# Patient Record
Sex: MALE | Race: WHITE | Employment: OTHER | URBAN - METROPOLITAN AREA
[De-identification: names, ages, dates, MRNs, and addresses within clinical notes are randomized per-mention and may not be internally consistent; named-entity substitution may affect disease eponyms.]

---

## 2017-01-26 ENCOUNTER — OFFICE VISIT (OUTPATIENT)
Dept: FAMILY MEDICINE CLINIC | Age: 77
End: 2017-01-26

## 2017-01-26 VITALS
SYSTOLIC BLOOD PRESSURE: 161 MMHG | BODY MASS INDEX: 26.2 KG/M2 | WEIGHT: 183 LBS | RESPIRATION RATE: 18 BRPM | OXYGEN SATURATION: 98 % | HEIGHT: 70 IN | TEMPERATURE: 98.2 F | DIASTOLIC BLOOD PRESSURE: 86 MMHG | HEART RATE: 82 BPM

## 2017-01-26 DIAGNOSIS — I10 ESSENTIAL HYPERTENSION: Primary | ICD-10-CM

## 2017-01-26 RX ORDER — LISINOPRIL 20 MG/1
20 TABLET ORAL DAILY
Qty: 90 TAB | Refills: 3 | Status: SHIPPED | OUTPATIENT
Start: 2017-01-26 | End: 2017-04-13 | Stop reason: SDUPTHER

## 2017-01-26 RX ORDER — AMLODIPINE BESYLATE 5 MG/1
5 TABLET ORAL DAILY
Qty: 90 TAB | Refills: 3 | Status: SHIPPED | OUTPATIENT
Start: 2017-01-26 | End: 2017-06-08

## 2017-01-26 NOTE — PROGRESS NOTES
Pt here for medication evaluation on lisinopril-hctz. Reports that he has had a rash on chest and legs since the hctz was added. States that he has stopped taking lisinopril-hctz and has been taking an old RX with only lisinopril. Requesting a RX for alternate BP medication. Also would like to discuss Advanced Directive. Pt completed and brought forms to be added to file. Subjective: (As above and below)     Chief Complaint   Patient presents with    Medication Evaluation     he is a 64y.o. year old male who presents for evaluation. Reviewed PmHx, RxHx, FmHx, SocHx, AllgHx and updated in chart. Review of Systems - negative except as listed above    Objective:     Vitals:    01/26/17 1504   BP: 161/86   Pulse: 82   Resp: 18   Temp: 98.2 °F (36.8 °C)   TempSrc: Oral   SpO2: 98%   Weight: 183 lb (83 kg)   Height: 5' 10\" (1.778 m)     Physical Examination: General appearance - alert, well appearing, and in no distress  Mental status - normal mood, behavior, speech, dress, motor activity, and thought processes  Eyes - pupils equal and reactive, extraocular eye movements intact  Mouth - mucous membranes moist, pharynx normal without lesions  Chest - clear to auscultation, no wheezes, rales or rhonchi, symmetric air entry  Heart - normal rate, regular rhythm, normal S1, S2, no murmurs, rubs, clicks or gallops  Musculoskeletal - no joint tenderness, deformity or swelling  Extremities - peripheral pulses normal, no pedal edema, no clubbing or cyanosis    Assessment/ Plan:   1. Essential hypertension  -continue on 20mg lisinopril  -Stop HCTZ due to rash  -add in amlodipine to improve BP control     Follow-up Disposition: As needed or in one month for BP check  I have discussed the diagnosis with the patient and the intended plan as seen in the above orders. The patient has received an after-visit summary and questions were answered concerning future plans.      Medication Side Effects and Warnings were discussed with patient: yes  Patient Labs were reviewed: yes  Patient Past Records were reviewed:  yes    Lynsey Hester M.D.

## 2017-01-26 NOTE — PROGRESS NOTES
Pt here for medication evaluation on lisinopril-hctz. Reports that he has had a rash on chest and legs since the hctz was added. States that he has stopped taking lisinopril-hctz and has been taking an old RX with only lisinopril. Requesting a RX for alternate BP medication. Also would like to discuss Advanced Directive.

## 2017-01-26 NOTE — MR AVS SNAPSHOT
Visit Information Date & Time Provider Department Dept. Phone Encounter #  
 1/26/2017  2:20 PM Ines Jordan MD [address] [facility] [phone] [id number] Upcoming Health Maintenance Date Due DTaP/Tdap/Td series (1 - Tdap) 3/27/1961 Pneumococcal 65+ Low/Medium Risk (2 of 2 - PPSV23) 3/8/2017 MEDICARE YEARLY EXAM 3/9/2017 GLAUCOMA SCREENING Q2Y 3/7/2018 COLONOSCOPY 12/2/2024 Allergies as of 1/26/2017  Review Complete On: 1/26/2017 By: Ines Jordan MD  
  
 Severity Noted Reaction Type Reactions Pcn [Penicillins]  06/01/2010    Other (comments) MOUTH SORE Current Immunizations  Reviewed on 3/8/2016 Name Date Influenza Vaccine 10/1/2016, 3/8/2016, 10/21/2014 Pneumococcal Conjugate (PCV-13) 3/8/2016 Not reviewed this visit You Were Diagnosed With   
  
 Codes Comments Essential hypertension    -  Primary ICD-10-CM: I10 
ICD-9-CM: 401.9 Vitals BP Pulse Temp Resp Height(growth percentile) Weight(growth percentile) 161/86 (BP 1 Location: Right arm, BP Patient Position: Sitting) 82 98.2 °F (36.8 °C) (Oral) 18 5' 10\" (1.778 m) 183 lb (83 kg) SpO2 BMI Smoking Status 98% 26.26 kg/m2 Former Smoker Vitals History BMI and BSA Data Body Mass Index Body Surface Area  
 26.26 kg/m 2 2.02 m 2 Preferred Pharmacy Pharmacy Name Phone [address] [address] [address] [address] [phone] Your Updated Medication List  
  
   
This list is accurate as of: 1/26/17  3:25 PM.  Always use your most recent med list. amLODIPine 5 mg tablet Commonly known as:  Arlyss Blanchard Take 1 Tab by mouth daily. aspirin delayed-release 81 mg tablet Take  by mouth daily. cetirizine 10 mg tablet Commonly known as:  ZYRTEC Take 1 Tab by mouth daily. lisinopril 20 mg tablet Commonly known as:  Marilynne Shows Take 1 Tab by mouth daily. Prescriptions Sent to Pharmacy Refills  
 lisinopril (PRINIVIL, ZESTRIL) 20 mg tablet 3 Sig: Take 1 Tab by mouth daily. Class: Normal  
 Pharmacy: [facility] [address], [facility] [address] Ph #: [phone] Route: Oral  
 amLODIPine (NORVASC) 5 mg tablet 3 Sig: Take 1 Tab by mouth daily. Class: Normal  
 Pharmacy: [facility] [address], [facility] [address] Ph #: [phone] Route: Oral  
  
Introducing [facility]! Dear Esthela Bradley: 
Thank you for requesting a [employer] account. Our records indicate that you already have an active [employer] account. You can access your account anytime at [email or web address] Did you know that you can access your hospital and ER discharge instructions at any time in [employer]? You can also review all of your test results from your hospital stay or ER visit. Additional Information If you have questions, please visit the Frequently Asked Questions section of the [employer] website at [email or web address] Remember, [employer] is NOT to be used for urgent needs. For medical emergencies, dial 911. Now available from your iPhone and Android! Please provide this summary of care documentation to your next provider. Your primary care clinician is listed as MAIDA HARLEY. If you have any questions after today's visit, please call [phone].

## 2017-04-14 RX ORDER — LISINOPRIL 20 MG/1
20 TABLET ORAL DAILY
Qty: 90 TAB | Refills: 3 | Status: SHIPPED | OUTPATIENT
Start: 2017-04-14 | End: 2018-05-24 | Stop reason: SDUPTHER

## 2017-04-18 ENCOUNTER — OFFICE VISIT (OUTPATIENT)
Dept: FAMILY MEDICINE CLINIC | Age: 77
End: 2017-04-18

## 2017-04-18 VITALS
RESPIRATION RATE: 20 BRPM | HEART RATE: 79 BPM | DIASTOLIC BLOOD PRESSURE: 78 MMHG | BODY MASS INDEX: 26.2 KG/M2 | HEIGHT: 70 IN | TEMPERATURE: 97.3 F | WEIGHT: 183 LBS | SYSTOLIC BLOOD PRESSURE: 133 MMHG | OXYGEN SATURATION: 96 %

## 2017-04-18 DIAGNOSIS — I10 ESSENTIAL HYPERTENSION: ICD-10-CM

## 2017-04-18 DIAGNOSIS — H26.9 CATARACT OF LEFT EYE, UNSPECIFIED CATARACT TYPE: ICD-10-CM

## 2017-04-18 DIAGNOSIS — Z00.00 ROUTINE GENERAL MEDICAL EXAMINATION AT A HEALTH CARE FACILITY: Primary | ICD-10-CM

## 2017-04-18 DIAGNOSIS — Z71.89 ADVANCED CARE PLANNING/COUNSELING DISCUSSION: ICD-10-CM

## 2017-04-18 NOTE — MR AVS SNAPSHOT
Visit Information Date & Time Provider Department Dept. Phone Encounter #  
 4/18/2017  2:40 PM Luzma Nails MD [address] [facility] [phone] [id number] Follow-up Instructions Return if symptoms worsen or fail to improve. Upcoming Health Maintenance Date Due DTaP/Tdap/Td series (1 - Tdap) 3/27/1961 Pneumococcal 65+ Low/Medium Risk (2 of 2 - PPSV23) 3/8/2017 MEDICARE YEARLY EXAM 3/9/2017 GLAUCOMA SCREENING Q2Y 3/7/2018 COLONOSCOPY 12/2/2024 Allergies as of 4/18/2017  Review Complete On: 4/18/2017 By: Luzma Nails MD  
  
 Severity Noted Reaction Type Reactions Pcn [Penicillins]  06/01/2010    Other (comments) MOUTH SORE Current Immunizations  Reviewed on 3/8/2016 Name Date Influenza Vaccine 10/1/2016, 3/8/2016, 10/21/2014 Pneumococcal Conjugate (PCV-13) 3/8/2016 Not reviewed this visit You Were Diagnosed With   
  
 Codes Comments Routine general medical examination at a health care facility    -  Primary ICD-10-CM: Z00.00 ICD-9-CM: V70.0 Advanced care planning/counseling discussion     ICD-10-CM: Z71.89 ICD-9-CM: V65.49 Essential hypertension     ICD-10-CM: I10 
ICD-9-CM: 401.9 Cataract of left eye, unspecified cataract type     ICD-10-CM: H26.9 ICD-9-CM: 366.9 Vitals BP Pulse Temp Resp Height(growth percentile) Weight(growth percentile) 133/78 79 97.3 °F (36.3 °C) 20 5' 10\" (1.778 m) 183 lb (83 kg) SpO2 BMI Smoking Status 96% 26.26 kg/m2 Former Smoker Vitals History BMI and BSA Data Body Mass Index Body Surface Area  
 26.26 kg/m 2 2.02 m 2 Preferred Pharmacy Pharmacy Name Phone [address], [facility] [address] [phone] Your Updated Medication List  
  
   
This list is accurate as of: 4/18/17  3:55 PM.  Always use your most recent med list.  
  
  
  
  
 amLODIPine 5 mg tablet Commonly known as:  Inna Jaksch Take 1 Tab by mouth daily. cetirizine 10 mg tablet Commonly known as:  ZYRTEC Take 1 Tab by mouth daily. lisinopril 20 mg tablet Commonly known as:  Aashish Leaver Take 1 Tab by mouth daily. Follow-up Instructions Return if symptoms worsen or fail to improve. Introducing [facility]! Dear Roxi Garza: 
Thank you for requesting a [employer] account. Our records indicate that you already have an active [employer] account. You can access your account anytime at [email or web address] Did you know that you can access your hospital and ER discharge instructions at any time in [employer]? You can also review all of your test results from your hospital stay or ER visit. Additional Information If you have questions, please visit the Frequently Asked Questions section of the [employer] website at [email or web address] Remember, [employer] is NOT to be used for urgent needs. For medical emergencies, dial 911. Now available from your iPhone and Android! Please provide this summary of care documentation to your next provider. Your primary care clinician is listed as MAIDA HARLEY. If you have any questions after today's visit, please call [phone].

## 2017-04-18 NOTE — PROGRESS NOTES
Patient ehre for cataract surgery pre-op. Patient also has form to be completed for bp monitor. Med eval, last visit in Jan with Dr. Lauren Chan, she stopped hctz, due to rash. Patient wondering if he should get back on it. 1. Have you been to the ER, urgent care clinic since your last visit? Hospitalized since your last visit? No    2. Have you seen or consulted any other health care providers outside of the [address] [place] [address] since your last visit? Include any pap smears or colon screening. No       See scanned form      Medicare Wellness Exam:    Chief Complaint   Patient presents with    Pre-op Exam     cataract 5/8/17    Other     form completed for bp machine.  Medication Evaluation     hctz stopped last visit due to rash. he is a 76y.o. year old male who presents for evaluation for their Medicare Wellness Visit. Lynita Dose is completed and assessed=yes  Depression Screen is completed and assessed=yes  Medication list reviewed and adjusted for accuracy=yes  Immunizations reviewed and updated=yes  Health/Preventative Screenings reviewed and updated=yes  ADL Functions reviewed=yes    Patient Active Problem List    Diagnosis    Advanced care planning/counseling discussion     Asked pt to bring in      Thrombocytopenia ([address]    Advance care planning     Pt has one      Acne rosacea    Hypercholesteremia    HTN (hypertension)       Reviewed PmHx, RxHx, FmHx, SocHx, AllgHx and updated and dated in the chart.     Review of Systems - negative except as listed above in the HPI    Objective:     Vitals:    04/18/17 1514   BP: 133/78   Pulse: 79   Resp: 20   Temp: 97.3 °F (36.3 °C)   SpO2: 96%   Weight: 183 lb (83 kg)   Height: 5' 10\" (1.778 m)     Physical Examination: General appearance - alert, well appearing, and in no distress  Chest - clear to auscultation, no wheezes, rales or rhonchi, symmetric air entry  Heart - normal rate, regular rhythm, normal S1, S2, no murmurs, rubs, clicks or gallops    Assessment/ Plan:   Mckenzie Lovell was seen today for pre-op exam, other and medication evaluation. Diagnoses and all orders for this visit:    Routine general medical examination at a health care facility  -see below    Advanced care planning/counseling discussion  -has LW    Essential hypertension  -at goal    Cataract of left eye, unspecified cataract type  -see form faxed         -Pain evaluation performed in office=0  -Cognitive Screen performed in office=nl  -Depression Screen, Fall risks (by up and go test)  and ADL functionality were addressed  -Medication list updated and reviewed for any changes   -A comprehensive review of medical issues and a plan was formulated  -End of life planning was addressed with pt   -Health Screenings for preventions were addressed and a plan was formulated  -Shingles Vaccine was recommended  -Discussed with patient cancer risk factors and appropriate screenings for age  -Patient evaluated for colonoscopy and referred if needed per screeing criteria  -Labs from previous visits were discussed with patient   -Discussed with patient diet and exercise and formulated a plan as needed  -An Advanced care plan was developed with the patient.  -Alcohol screening performed and was negative    -Follow-up Disposition:  Return if symptoms worsen or fail to improve. I have discussed the diagnosis with the patient and the intended plan as seen in the above orders. The patient understands and agrees with the plan. The patient has received an after-visit summary and questions were answered concerning future plans. Medication Side Effects and Warnings were discussed with patien  Patient Labs were reviewed and or requested  Patient Past Records were reviewed and or requested    There are no Patient Instructions on file for this visit.       José Manuel Onofre M.D.

## 2017-05-22 ENCOUNTER — OFFICE VISIT (OUTPATIENT)
Dept: FAMILY MEDICINE CLINIC | Age: 77
End: 2017-05-22

## 2017-05-22 VITALS
WEIGHT: 184 LBS | DIASTOLIC BLOOD PRESSURE: 87 MMHG | OXYGEN SATURATION: 97 % | RESPIRATION RATE: 18 BRPM | BODY MASS INDEX: 26.34 KG/M2 | HEIGHT: 70 IN | HEART RATE: 78 BPM | SYSTOLIC BLOOD PRESSURE: 145 MMHG | TEMPERATURE: 98.1 F

## 2017-05-22 DIAGNOSIS — I10 ESSENTIAL HYPERTENSION: ICD-10-CM

## 2017-05-22 DIAGNOSIS — I48.91 ATRIAL FIBRILLATION, UNSPECIFIED TYPE (HCC): ICD-10-CM

## 2017-05-22 DIAGNOSIS — I49.9 IRREGULAR HEART RHYTHM: Primary | ICD-10-CM

## 2017-05-22 NOTE — MR AVS SNAPSHOT
Visit Information Date & Time Provider Department Dept. Phone Encounter #  
 5/22/2017 10:00 AM Josefa Back MD [address] [facility] [phone] [id number] Follow-up Instructions Return if symptoms worsen or fail to improve. Upcoming Health Maintenance Date Due DTaP/Tdap/Td series (1 - Tdap) 3/27/1961 Pneumococcal 65+ Low/Medium Risk (2 of 2 - PPSV23) 3/8/2017 INFLUENZA AGE 9 TO ADULT 8/1/2017 GLAUCOMA SCREENING Q2Y 3/7/2018 MEDICARE YEARLY EXAM 4/19/2018 COLONOSCOPY 12/2/2024 Allergies as of 5/22/2017  Review Complete On: 5/22/2017 By: Josefa Back MD  
  
 Severity Noted Reaction Type Reactions Pcn [Penicillins]  06/01/2010    Other (comments) MOUTH SORE Current Immunizations  Reviewed on 3/8/2016 Name Date Influenza Vaccine 10/1/2016, 3/8/2016, 10/21/2014 Pneumococcal Conjugate (PCV-13) 3/8/2016 Not reviewed this visit You Were Diagnosed With   
  
 Codes Comments Irregular heart rhythm    -  Primary ICD-10-CM: I49.9 ICD-9-CM: 427.9 Atrial fibrillation, unspecified type ([facility])     ICD-10-CM: I48.91 
ICD-9-CM: 427.31 Essential hypertension     ICD-10-CM: I10 
ICD-9-CM: 401.9 Vitals BP Pulse Temp Resp Height(growth percentile) Weight(growth percentile) 145/87 78 98.1 °F (36.7 °C) 18 5' 10\" (1.778 m) 184 lb (83.5 kg) SpO2 BMI Smoking Status 97% 26.4 kg/m2 Former Smoker Vitals History BMI and BSA Data Body Mass Index Body Surface Area  
 26.4 kg/m 2 2.03 m 2 Preferred Pharmacy Pharmacy Name Phone [address] [address] [facility] [address] [phone] Your Updated Medication List  
  
   
This list is accurate as of: 5/22/17 10:23 AM.  Always use your most recent med list. amLODIPine 5 mg tablet Commonly known as:  Deisi Hurtado Take 1 Tab by mouth daily. lisinopril 20 mg tablet Commonly known as:  Yolanda Julien Take 1 Tab by mouth daily. We Performed the Following AMB POC EKG ROUTINE W/ 12 LEADS, INTER & REP [91542 CPT(R)] REFERRAL TO CARDIOLOGY [TBY90 Custom] Follow-up Instructions Return if symptoms worsen or fail to improve. Referral Information Referral ID Referred By Referred To  
  
 [id number] Chandni HARLEY MD   
   [address] Phone: [phone] Fax: [phone] Visits Status Start Date End Date 1 New Request 5/22/17 5/22/18 If your referral has a status of pending review or denied, additional information will be sent to support the outcome of this decision. Introducing [facility]! Dear Steve Martel: 
Thank you for requesting a [employer] account. Our records indicate that you already have an active [employer] account. You can access your account anytime at [email or web address] Did you know that you can access your hospital and ER discharge instructions at any time in [employer]? You can also review all of your test results from your hospital stay or ER visit. Additional Information If you have questions, please visit the Frequently Asked Questions section of the [employer] website at [email or web address] Remember, [employer] is NOT to be used for urgent needs. For medical emergencies, dial 911. Now available from your iPhone and Android! Please provide this summary of care documentation to your next provider. Your primary care clinician is listed as MAIDA HARLEY. If you have any questions after today's visit, please call [phone].

## 2017-05-22 NOTE — PROGRESS NOTES
Patient states he had cataract surgery 2 weeks ago, anethestesiologist noticed he had irregular heart rate. Patient also states he has had more swelling in feet, more so on left than right. 1. Have you been to the ER, urgent care clinic since your last visit? Hospitalized since your last visit? No    2. Have you seen or consulted any other health care providers outside of the [address] [place] [address] since your last visit? Include any pap smears or colon screening. No     No palpitations    No hx of AF and no sxs      Chief Complaint   Patient presents with    Heart Problem     anestesiologist noticed during cataract surgery 2 weeks ago.  Foot Swelling     right foot swelling more than left     he is a 76y.o. year old male who presents for evalution. Reviewed PmHx, RxHx, FmHx, SocHx, AllgHx and updated and dated in the chart. Patient Active Problem List    Diagnosis    Advanced care planning/counseling discussion     Asked pt to bring in      Thrombocytopenia ([facility]    Advance care planning     Pt has one      Acne rosacea    Hypercholesteremia    HTN (hypertension)       Review of Systems - negative except as listed above in the HPI    Objective:     Vitals:    05/22/17 0959   BP: 145/87   Pulse: 78   Resp: 18   Temp: 98.1 °F (36.7 °C)   SpO2: 97%   Weight: 184 lb (83.5 kg)   Height: 5' 10\" (1.778 m)     Physical Examination: General appearance - alert, well appearing, and in no distress  Neck - supple, no significant adenopathy  Chest - clear to auscultation, no wheezes, rales or rhonchi, symmetric air entry  Heart - normal rate, regular rhythm, normal S1, S2, no murmurs, rubs, clicks or gallops  Abdomen - soft, nontender, nondistended, no masses or organomegaly    Assessment/ Plan:   Jey Mitchell was seen today for heart problem and foot swelling.     Diagnoses and all orders for this visit:    Irregular heart rhythm  -     AMB POC EKG ROUTINE W/ 12 LEADS, INTER & REP=AF  -     REFERRAL TO CARDIOLOGY    Atrial fibrillation, unspecified type ([facility] [address] [address]  -     REFERRAL TO CARDIOLOGY  -add ASA daily for now  -rate is controlled    Essential hypertension  -BP is sl inc       Follow-up Disposition:  Return if symptoms worsen or fail to improve. I have discussed the diagnosis with the patient and the intended plan as seen in the above orders. The patient understands and agrees with the plan. The patient has received an after-visit summary and questions were answered concerning future plans. Medication Side Effects and Warnings were discussed with patient  Patient Labs were reviewed and or requested:  Patient Past Records were reviewed and or requested    Honor SHANNEN Lockwood. There are no Patient Instructions on file for this visit.

## 2017-06-08 ENCOUNTER — OFFICE VISIT (OUTPATIENT)
Dept: CARDIOLOGY CLINIC | Age: 77
End: 2017-06-08

## 2017-06-08 VITALS
WEIGHT: 183 LBS | BODY MASS INDEX: 26.2 KG/M2 | RESPIRATION RATE: 16 BRPM | DIASTOLIC BLOOD PRESSURE: 90 MMHG | HEART RATE: 71 BPM | OXYGEN SATURATION: 98 % | SYSTOLIC BLOOD PRESSURE: 140 MMHG | HEIGHT: 70 IN

## 2017-06-08 DIAGNOSIS — R60.0 EDEMA OF RIGHT LOWER EXTREMITY: ICD-10-CM

## 2017-06-08 DIAGNOSIS — I45.10 RBBB: ICD-10-CM

## 2017-06-08 DIAGNOSIS — I49.3 ASYMPTOMATIC PVCS: ICD-10-CM

## 2017-06-08 DIAGNOSIS — I10 ESSENTIAL HYPERTENSION: ICD-10-CM

## 2017-06-08 DIAGNOSIS — I48.19 PERSISTENT ATRIAL FIBRILLATION (HCC): Primary | ICD-10-CM

## 2017-06-08 RX ORDER — METOPROLOL TARTRATE 25 MG/1
25 TABLET, FILM COATED ORAL 2 TIMES DAILY
Qty: 60 TAB | Refills: 6 | Status: SHIPPED | OUTPATIENT
Start: 2017-06-08 | End: 2018-01-24 | Stop reason: SDUPTHER

## 2017-06-08 RX ORDER — DABIGATRAN ETEXILATE 150 MG/1
150 CAPSULE ORAL EVERY 12 HOURS
Qty: 48 CAP | Refills: 0 | Status: SHIPPED | COMMUNITY
Start: 2017-06-08 | End: 2017-08-03 | Stop reason: SDUPTHER

## 2017-06-08 RX ORDER — DABIGATRAN ETEXILATE 150 MG/1
150 CAPSULE ORAL EVERY 12 HOURS
Qty: 60 CAP | Refills: 6 | Status: SHIPPED | OUTPATIENT
Start: 2017-06-08 | End: 2018-03-01

## 2017-06-08 NOTE — PATIENT INSTRUCTIONS
Stop Norvasc or Amlodipine and Aspirin. Start pradaxa 150mg twice a day. You will be scheduled for an echo. You will need to follow up in clinic with Dr. Francisca Caba in 3 months.

## 2017-06-08 NOTE — PROGRESS NOTES
Cardiac Electrophysiology Consultation Note     Subjective:      Tanya Gaines is a 76 y.o. male patient who is seen for evaluation of atrial fibrillation newly dx. Kindly referred by Dr. José Manuel Onofre. He had cataract surgery 4 weeks ago and it revealed an irregular heart beat. He then went to his PCP Dr Cindy Thomas, who dx him with atrial fibrillation.   + RLE for about 4 months. He was unaware of the irregular heart beat. He denies SOB/SAVAGE, chest pain, lightheadedness or dizziness. Occasional lightheadedness with position change. He sometimes feels his heart beating at night. His platelets are on the low side and he bruises easily. He has been taking an ASA. PMhx hypertension. No DM, CHF, MI. His wife has atrial fibrillation and takes coumadin. Social hx: no tobacco. 1-2 beers daily. He plays golf. [employer]. . Family hx: No family hx of CAD. Patient Active Problem List   Diagnosis Code    Acne rosacea L71.9    Hypercholesteremia E78.00    HTN (hypertension) I10    Thrombocytopenia ([facility] D69.6    Advance care planning Z71.89    Advanced care planning/counseling discussion Z71.89     Current Outpatient Prescriptions   Medication Sig Dispense Refill    lisinopril (PRINIVIL, ZESTRIL) 20 mg tablet Take 1 Tab by mouth daily. 90 Tab 3    amLODIPine (NORVASC) 5 mg tablet Take 1 Tab by mouth daily. 90 Tab 3     Allergies   Allergen Reactions    Hydrochlorothiazide Rash    Pcn [Penicillins] Other (comments)     MOUTH SORE     Past Medical History:   Diagnosis Date    Acne rosacea 6/1/2010    HTN (hypertension) 6/1/2010    Hypercholesteremia 6/1/2010     History reviewed. No pertinent surgical history.   Family History   Problem Relation Age of Onset    Lung Disease Father      Social History   Substance Use Topics    Smoking status: Former Smoker    Smokeless tobacco: Never Used      Comment: 1970    Alcohol use Yes        Review of Systems:   Constitutional: Negative for fever, chills, weight loss, malaise/fatigue. HEENT: Negative for nosebleeds, vision changes. Respiratory: Negative for cough, hemoptysis, sputum production, and wheezing. Cardiovascular: Negative for chest pain, palpitations, orthopnea, claudication, +leg swelling, no syncope, and PND. Gastrointestinal: Negative for nausea, vomiting, diarrhea, constipation, blood in stool and melena. Genitourinary: Negative for dysuria, and hematuria. Musculoskeletal: Negative for myalgias, +arthralgia. Skin: Negative for rash. Heme: Does not bleed or +bruise easily. Neurological: Negative for speech change and focal weakness     Objective:     Visit Vitals    /90 (BP 1 Location: Left arm, BP Patient Position: Sitting)    Pulse 71    Resp 16    Ht 5' 10\" (1.778 m)    Wt 183 lb (83 kg)    SpO2 98%    BMI 26.26 kg/m2      Physical Exam:   Constitutional: Well-nourished. No distress. Head: Normocephalic and atraumatic. Eyes: Pupils are equal, round   Neck: supple. No JVD present. Cardiovascular: Normal rate, irregular rhythm. Exam reveals no gallop and no friction rub. No murmur heard. Pulmonary/Chest: Effort normal and breath sounds normal. No wheezes. Abdominal: Soft, no tenderness. Musculoskeletal: + RLE tibial edema. Trace LLE tibial edema   Neurological: alert,oriented. Skin: Skin is warm and dry  Psychiatric: normal mood and affect. Behavior is normal. Judgment and thought content normal.      EKG 5/22/17: atrial fibrillation ventricular, RBBB rate 112 bpm      Assessment/Plan:       ICD-10-CM ICD-9-CM    1. Persistent atrial fibrillation (HCC) I48.1 427.31    2. Essential hypertension I10 401.9    3. Edema of right lower extremity R60.0 782.3    4. RBBB I45.10 426.4    5. Asymptomatic PVCs I49.3 427.69      Discussed rate and rhythm control for atrial fibrillation. He is currently asymptomatic and unaware of the irregular heart beat.    [id number]= 3 (age >75 yo and hypertension), he will stop ASA. He is agreeable to start Pradaxa, but if this is too expensive he wants to switch to coumadin (he is familiar with coumadin since his wife is taking it). We discussed medications, side effects and bleeding risk. STOP ASA. Will order echo to assess LVEF and valvular abnormalities, discussed possible stress test if echo is abnormal.   Will stop norvasc and add metoprolol for rate control. D/c the Norvasc may help with LE edema. RBBB on ECG, no previous ECG to compare. Reviewed above plan with patient and he is agreeable. Thank you for involving me in this patient's care and please call with further concerns or questions. Shobha Hernadez M.D.   Electrophysiology/Cardiology  [facility]  [address], [address] [place] [address] [place] [address] [place] [address]  [phone]

## 2017-06-08 NOTE — PROGRESS NOTES
Cardiac Electrophysiology Office Consultation Note     Subjective:      Felipa Parson is a 76 y.o. male patient who is seen for evaluation of atrial fibrillation   It is a new dx. He is kindly referred by Dr. Chuck Chen. He had cataract surgery 4 weeks ago and it revealed an irregular heart beat when the anesthesiologist hooked him to monitor. He then went to his PCP, Dr Monroe Morataya, who dx him with atrial fibrillation on 12 lead ECG.   + RLE for about 4 months. He was aware of the irregular heart beat. He denies SOB/SAVAGE, chest pain, lightheadedness or dizziness. Occasional lightheadedness with position change. He sometimes feels his heart beating at night. His platelets are on the low side and he bruises easily. He has been taking an ASA. PMhx hypertension. No DM, CHF, MI. His wife has atrial fibrillation and takes coumadin. Social hx: no tobacco. 1-2 beers daily. He plays golf. [employer]. . Family hx: No family hx of CAD. Parents  of old age    Patient Active Problem List   Diagnosis Code    Acne rosacea L71.9    Hypercholesteremia E78.00    HTN (hypertension) I10    Thrombocytopenia ([facility] [address] D69.6    Advance care planning Z71.89    Advanced care planning/counseling discussion Z71.89     Current Outpatient Prescriptions   Medication Sig Dispense Refill    dabigatran etexilate (PRADAXA) 150 mg capsule Take 1 Cap by mouth every twelve (12) hours. 48 Cap 0    dabigatran etexilate (PRADAXA) 150 mg capsule Take 1 Cap by mouth every twelve (12) hours. 60 Cap 6    lisinopril (PRINIVIL, ZESTRIL) 20 mg tablet Take 1 Tab by mouth daily. 90 Tab 3     Allergies   Allergen Reactions    Hydrochlorothiazide Rash    Pcn [Penicillins] Other (comments)     MOUTH SORE     Past Medical History:   Diagnosis Date    Acne rosacea 2010    HTN (hypertension) 2010    Hypercholesteremia 2010     History reviewed. No pertinent surgical history.   Family History   Problem Relation Age of Onset    Lung Disease Father      Social History   Substance Use Topics    Smoking status: Former Smoker    Smokeless tobacco: Never Used      Comment: 1968    Alcohol use Yes        Review of Systems:   Constitutional: Negative for fever, chills, weight loss, malaise/fatigue. HEENT: Negative for nosebleeds, vision changes. Respiratory: Negative for cough, hemoptysis, sputum production, and wheezing. Cardiovascular: Negative for chest pain, palpitations, orthopnea, claudication, +leg swelling, no syncope, and PND. Gastrointestinal: Negative for nausea, vomiting, diarrhea, constipation, blood in stool and melena. Genitourinary: Negative for dysuria, and hematuria. Musculoskeletal: Negative for myalgias, +arthralgia. Skin: Negative for rash. Heme: Does not bleed or +bruise easily. Neurological: Negative for speech change and focal weakness     Objective:     Visit Vitals    /90 (BP 1 Location: Left arm, BP Patient Position: Sitting)    Pulse 71    Resp 16    Ht 5' 10\" (1.778 m)    Wt 183 lb (83 kg)    SpO2 98%    BMI 26.26 kg/m2      Physical Exam:   Constitutional: Well-nourished. No distress. Head: Normocephalic and atraumatic. Eyes: Pupils are equal, round   Neck: supple. No JVD present. Cardiovascular: Normal rate, irregular rhythm. Exam reveals no gallop and no friction rub. No murmur heard. Pulmonary/Chest: Effort normal and breath sounds normal. No wheezes. Abdominal: Soft, no tenderness. Musculoskeletal: + RLE tibial edema. Trace LLE tibial edema   Neurological: alert,oriented. Skin: Skin is warm and dry, bruises on the hands  Psychiatric: normal mood and affect. Behavior is normal. Judgment and thought content normal.      EKG 5/22/17: atrial fibrillation RBBB PVC and increased V rate      Assessment/Plan:       ICD-10-CM ICD-9-CM    1. Persistent atrial fibrillation (HCC) I48.1 427.31    2. Essential hypertension I10 401.9    3.  Edema of right lower extremity R60.0 782.3    4. RBBB I45.10 426.4    5. Asymptomatic PVCs I49.3 427.69      Discussed rate and rhythm control for atrial fibrillation. He is currently mildly symptomatic and I recommend rate control option  [id number]= 3 (age >77 yo and hypertension), he will stop ASA. He is agreeable to start Pradaxa (I think his insurance prefers pradaxa), but if this is too expensive he wants to switch to coumadin (he is familiar with coumadin since his wife is taking it). We discussed medications, side effects and bleeding risk. STOP ASA. Will order echo to assess LVEF and valvular abnormalities, discussed possible stress test/cath if echo is abnormal.   Will stop norvasc and add metoprolol for rate control. D/c the Norvasc may help with LE edema. RBBB on ECG, no previous ECG to compare. Reviewed above plan with patient and he is agreeable. Follow-up Disposition:  Return in about 3 months (around 9/8/2017). Thank you for involving me in this patient's care and please call with further concerns or questions. Octavia Navas M.D.   Electrophysiology/Cardiology  [facility] [place]  [address], [address] [place] [address]  [phone]

## 2017-06-08 NOTE — MR AVS SNAPSHOT
Visit Information Date & Time Provider Department Dept. Phone Encounter #  
 6/8/2017  1:40 PM Anayeli Pringle MD [facility] Akil Burr [phone] [id number] Follow-up Instructions Return in about 3 months (around 9/8/2017). Upcoming Health Maintenance Date Due DTaP/Tdap/Td series (1 - Tdap) 3/27/1961 Pneumococcal 65+ Low/Medium Risk (2 of 2 - PPSV23) 3/8/2017 INFLUENZA AGE 9 TO ADULT 8/1/2017 GLAUCOMA SCREENING Q2Y 3/7/2018 MEDICARE YEARLY EXAM 4/19/2018 COLONOSCOPY 12/2/2024 Allergies as of 6/8/2017  Review Complete On: 6/8/2017 By: Anayeli Pringle MD  
  
 Severity Noted Reaction Type Reactions Hydrochlorothiazide  06/08/2017    Rash Pcn [Penicillins]  06/01/2010    Other (comments) MOUTH SORE Current Immunizations  Reviewed on 3/8/2016 Name Date Influenza Vaccine 10/1/2016, 3/8/2016, 10/21/2014 Pneumococcal Conjugate (PCV-13) 3/8/2016 Not reviewed this visit You Were Diagnosed With   
  
 Codes Comments Persistent atrial fibrillation (HCC)    -  Primary ICD-10-CM: I48.1 ICD-9-CM: 427.31 Essential hypertension     ICD-10-CM: I10 
ICD-9-CM: 401.9 Edema of right lower extremity     ICD-10-CM: R60.0 ICD-9-CM: 782.3 RBBB     ICD-10-CM: I45.10 ICD-9-CM: 426.4 Asymptomatic PVCs     ICD-10-CM: I49.3 ICD-9-CM: 427.69 Vitals BP Pulse Resp Height(growth percentile) Weight(growth percentile) SpO2  
 140/90 (BP 1 Location: Left arm, BP Patient Position: Sitting) 71 16 5' 10\" (1.778 m) 183 lb (83 kg) 98% BMI Smoking Status 26.26 kg/m2 Former Smoker BMI and BSA Data Body Mass Index Body Surface Area  
 26.26 kg/m 2 2.02 m 2 Preferred Pharmacy Pharmacy Name Phone [address], [facility] [phone] Your Updated Medication List  
  
   
 This list is accurate as of: 6/8/17  2:44 PM.  Always use your most recent med list.  
  
  
  
  
 * dabigatran etexilate 150 mg capsule Commonly known as:  PRADAXA Take 1 Cap by mouth every twelve (12) hours. * dabigatran etexilate 150 mg capsule Commonly known as:  PRADAXA Take 1 Cap by mouth every twelve (12) hours. lisinopril 20 mg tablet Commonly known as:  Dorean Peeks Take 1 Tab by mouth daily. metoprolol tartrate 25 mg tablet Commonly known as:  LOPRESSOR Take 1 Tab by mouth two (2) times a day. * Notice: This list has 2 medication(s) that are the same as other medications prescribed for you. Read the directions carefully, and ask your doctor or other care provider to review them with you. Prescriptions Sent to Pharmacy Refills  
 dabigatran etexilate (PRADAXA) 150 mg capsule 6 Sig: Take 1 Cap by mouth every twelve (12) hours. Class: Normal  
 Pharmacy: [facility] [address] [place] [address] AT [place] [address] Ph #: [phone] Route: Oral  
 metoprolol tartrate (LOPRESSOR) 25 mg tablet 6 Sig: Take 1 Tab by mouth two (2) times a day. Class: Normal  
 Pharmacy: [facility] [address] [place] [address] AT [place] [address] Ph #: [phone] Route: Oral  
  
Follow-up Instructions Return in about 3 months (around 9/8/2017). To-Do List   
 06/08/2017 ECHO:  2D ECHO COMPLETE ADULT (TTE) W OR WO CONTR Patient Instructions Stop Norvasc or Amlodipine and Aspirin. Start pradaxa 150mg twice a day. You will be scheduled for an echo. You will need to follow up in clinic with Dr. Aida Nails in 3 months. Introducing [facility] & HEALTH SERVICES! Dear Joselyn Orlando: 
Thank you for requesting a LoveThatFit account. Our records indicate that you already have an active LoveThatFit account.   You can access your account anytime at [email or web address] Did you know that you can access your hospital and ER discharge instructions at any time in NakedRoom? You can also review all of your test results from your hospital stay or ER visit. Additional Information If you have questions, please visit the Frequently Asked Questions section of the NakedRoom website at [email or web address] Remember, NakedRoom is NOT to be used for urgent needs. For medical emergencies, dial 911. Now available from your iPhone and Android! Please provide this summary of care documentation to your next provider. Your primary care clinician is listed as MAIDA HARLEY. If you have any questions after today's visit, please call [phone].

## 2017-06-16 ENCOUNTER — CLINICAL SUPPORT (OUTPATIENT)
Dept: CARDIOLOGY CLINIC | Age: 77
End: 2017-06-16

## 2017-06-16 DIAGNOSIS — I10 ESSENTIAL HYPERTENSION: ICD-10-CM

## 2017-06-16 DIAGNOSIS — I48.19 PERSISTENT ATRIAL FIBRILLATION (HCC): ICD-10-CM

## 2017-06-16 DIAGNOSIS — I45.10 RBBB: ICD-10-CM

## 2017-06-16 DIAGNOSIS — I49.3 ASYMPTOMATIC PVCS: ICD-10-CM

## 2017-06-16 DIAGNOSIS — R60.0 EDEMA OF RIGHT LOWER EXTREMITY: ICD-10-CM

## 2017-06-20 NOTE — PROGRESS NOTES
Echo with normal LVEF and moderate MR TR  Continue FU  AFIB    Future Appointments  Date Time Provider Sancho Harmon   9/8/2017 11:20 AM Seth Graham  [address]

## 2017-06-21 ENCOUNTER — TELEPHONE (OUTPATIENT)
Dept: CARDIOLOGY CLINIC | Age: 77
End: 2017-06-21

## 2017-06-21 NOTE — PROGRESS NOTES
Echo with normal LVEF and moderate MR TR  No change in management for now    Future Appointments  Date Time Provider Sancho Harmon   9/8/2017 11:20 AM Lawrence Nuñez  [address]

## 2017-06-21 NOTE — TELEPHONE ENCOUNTER
----- Message from Briana Rodriguez MD sent at 6/21/2017  7:53 AM EDT -----  Echo with normal LVEF and moderate MR  No change in management for now  9/8/2017   11:20 AM   Briana Rodriguez MD            [place] [address]

## 2017-06-23 ENCOUNTER — TELEPHONE (OUTPATIENT)
Dept: CARDIOLOGY CLINIC | Age: 77
End: 2017-06-23

## 2017-08-04 RX ORDER — DABIGATRAN ETEXILATE 150 MG/1
150 CAPSULE ORAL EVERY 12 HOURS
Qty: 180 CAP | Refills: 1 | Status: SHIPPED | OUTPATIENT
Start: 2017-08-04 | End: 2017-10-19 | Stop reason: SDUPTHER

## 2017-08-04 NOTE — TELEPHONE ENCOUNTER
From: Jami Farrell  To: Ana Maria Vargas MD  Sent: 8/3/2017 8:16 PM EDT  Subject: Medication Renewal Request    Original authorizing provider: MD Connie Marrero. Dari Ventura would like a refill of the following medications:  dabigatran etexilate (PRADAXA) 150 mg capsule Ana Maria Vargas MD]    Preferred pharmacy: [facility] [address]    Comment:  I spoke with my insurance company today regarding my Rx for Pradaxa and they want me to get it mail order from [address] [place] [address]. The address for them is [address]. Their fax number is [phone]. I have enough capsules for one week so I would appreciate it if you could expedite my Rx for this. Thank you for you assistance with this and I look forward to receiving my meds via mail to me.  Andrey Lara

## 2017-08-04 NOTE — TELEPHONE ENCOUNTER
Request for pradaxa 150mg twice a day. Last office visit 6/8/17, next office visit 9/8/17. Refills per verbal order from Dr. Fortunato Awad.

## 2017-10-19 ENCOUNTER — OFFICE VISIT (OUTPATIENT)
Dept: CARDIOLOGY CLINIC | Age: 77
End: 2017-10-19

## 2017-10-19 VITALS
SYSTOLIC BLOOD PRESSURE: 138 MMHG | HEIGHT: 70 IN | OXYGEN SATURATION: 99 % | BODY MASS INDEX: 26.2 KG/M2 | WEIGHT: 183 LBS | HEART RATE: 64 BPM | DIASTOLIC BLOOD PRESSURE: 70 MMHG

## 2017-10-19 DIAGNOSIS — I48.19 PERSISTENT ATRIAL FIBRILLATION (HCC): Primary | ICD-10-CM

## 2017-10-19 DIAGNOSIS — I45.10 RBBB: ICD-10-CM

## 2017-10-19 DIAGNOSIS — I10 ESSENTIAL HYPERTENSION: ICD-10-CM

## 2017-10-19 RX ORDER — FUROSEMIDE 20 MG/1
20 TABLET ORAL DAILY
Qty: 30 TAB | Refills: 0 | Status: SHIPPED | OUTPATIENT
Start: 2017-10-19 | End: 2018-03-01 | Stop reason: SDUPTHER

## 2017-10-19 RX ORDER — FUROSEMIDE 20 MG/1
20 TABLET ORAL DAILY
Qty: 30 TAB | Refills: 5 | Status: SHIPPED | OUTPATIENT
Start: 2017-10-19 | End: 2019-12-20

## 2017-10-19 NOTE — PATIENT INSTRUCTIONS
Please start lasix 20 mg daily    You will need to follow up in clinic with Dr. Finn Jerez in 6 months.  You have been scheduled for 4/12/17 at 8:00 am.

## 2017-10-19 NOTE — PROGRESS NOTES
Cardiac Electrophysiology Office Note     Subjective:      Gregoria Spencer is a 76 y.o. male patient who is seen for evaluation of atrial fibrillation and it was a new diagnosis when I saw him  He is kindly referred by Dr. Ilir Marie. He had cataract surgery 4 weeks prior and it revealed an irregular heart beat when the anesthesiologist hooked him to monitor. He then went to his PCP, Dr Jacinta Aschoff, who dx him with atrial fibrillation on 12 lead ECG.   + RLE for about 4 months. He was aware of the irregular heart beat. He denies SOB/SAVAGE, chest pain, lightheadedness or dizziness. Occasional lightheadedness with position change. He sometimes feels his heart beating at night. His platelets are on the low side and he bruises easily. He has been taking an ASA and after I saw him I recommend to change aspirin to Pradaxa  He has no bleeding but he said today he had felt dry coughs first 2 weeks but it resolved  He now feels shortness of breaths with walking     PMhx hypertension. No DM, CHF, MI. His wife has atrial fibrillation and she takes coumadin. Social hx: no tobacco. 1-2 beers daily. He plays golf. [employer]. . Family hx: No family hx of CAD. Parents  of old age    Patient Active Problem List   Diagnosis Code    Acne rosacea L71.9    Hypercholesteremia E78.00    HTN (hypertension) I10    Thrombocytopenia ([facility] [address] D69.6    Advance care planning Z71.89    Advanced care planning/counseling discussion Z71.89     Current Outpatient Prescriptions   Medication Sig Dispense Refill    dabigatran etexilate (PRADAXA) 150 mg capsule Take 1 Cap by mouth every twelve (12) hours. 60 Cap 6    metoprolol tartrate (LOPRESSOR) 25 mg tablet Take 1 Tab by mouth two (2) times a day. 60 Tab 6    lisinopril (PRINIVIL, ZESTRIL) 20 mg tablet Take 1 Tab by mouth daily. 90 Tab 3    dabigatran etexilate (PRADAXA) 150 mg capsule Take 1 Cap by mouth every twelve (12) hours.  180 Cap 1     Allergies   Allergen Reactions    Hydrochlorothiazide Rash    Pcn [Penicillins] Other (comments)     MOUTH SORE     Past Medical History:   Diagnosis Date    Acne rosacea 6/1/2010    HTN (hypertension) 6/1/2010    Hypercholesteremia 6/1/2010     No past surgical history   Family History   Problem Relation Age of Onset    Lung Disease Father      Social History   Substance Use Topics    Smoking status: Former Smoker    Smokeless tobacco: Never Used      Comment: 1968    Alcohol use Yes      Review of Systems:   Constitutional: Negative for fever, chills, weight loss, malaise/fatigue. HEENT: Negative for nosebleeds, vision changes. Respiratory: Negative for cough, hemoptysis, sputum production, and wheezing. Cardiovascular: Negative for chest pain, palpitations, orthopnea, claudication, +leg swelling, no syncope, and PND. Gastrointestinal: Negative for nausea, vomiting, diarrhea, constipation, blood in stool and melena. Genitourinary: Negative for dysuria, and hematuria. Musculoskeletal: Negative for myalgias, +arthralgia. Skin: Negative for rash. Heme: Does not bleed or +bruise easily. Neurological: Negative for speech change and focal weakness     Objective:     Visit Vitals    /70 (BP 1 Location: Left arm, BP Patient Position: Sitting)    Pulse 64    Ht 5' 10\" (1.778 m)    Wt 183 lb (83 kg)    SpO2 99%    BMI 26.26 kg/m2      Physical Exam:   Constitutional: Well-nourished. No distress. Head: Normocephalic and atraumatic. Eyes: Pupils are equal, round   Neck: supple. No JVD present. Cardiovascular: Normal rate, irregular rhythm. Exam reveals no gallop and no friction rub. No murmur heard. Pulmonary/Chest: Effort normal and breath sounds normal. No wheezes. Abdominal: Soft, no tenderness. Musculoskeletal: + RLE tibial edema. Trace LLE tibial edema   Neurological: alert,oriented. Skin: Skin is warm and dry, bruises on the hands  Psychiatric: normal mood and affect.  Behavior is normal. Judgment and thought content normal.      EKG 5/22/17: atrial fibrillation RBBB PVC and increased V rate      Assessment/Plan:       ICD-10-CM ICD-9-CM    1. Persistent atrial fibrillation (HCC) I48.1 427.31    2. Essential hypertension I10 401.9    3. RBBB I45.10 426.4      Discussed rate and rhythm control for atrial fibrillation. He is currently more symptomatic and I recommended rhythm control option but he has not decided to do cardioversion  He has normal LVEF and moderate MR TR. We discussed MAT before cardioversion as well  He does not want to change lisinopril to losartan and said cough has resolved  [id number]= 3 (age >77 yo and hypertension), he prefers pradaxa as it has reversing agent  RBBB on ECG, no previous ECG to compare. Reviewed above plan with patient and he is agreeable and will call back for MAT cardioversion  Lasix 20 mg every day for leg edema  Follow-up Disposition:  Return in about 6 months (around 4/19/2018). Thank you for involving me in this patient's care and please call with further concerns or questions. Jd Mccloud M.D.   Electrophysiology/Cardiology  [facility] [place]  [address], [address] [place] [address]  [phone]

## 2017-10-19 NOTE — MR AVS SNAPSHOT
Visit Information Date & Time Provider Department Dept. Phone Encounter #  
 10/19/2017  4:40 PM Floyd Gonsalez MD [facility] Yasmeen Willson [phone] [id number] Follow-up Instructions Return in about 6 months (around 4/19/2018). Your Appointments 4/12/2018  8:00 AM  
ESTABLISHED PATIENT with Floyd Gonsalez MD  
[facility] ([address]) Appt Note: 6 month f/u  
 [address] [facility] [address] [place] [address] [address] [facility] [address] [id number] Upcoming Health Maintenance Date Due DTaP/Tdap/Td series (1 - Tdap) 3/27/1961 Pneumococcal 65+ Low/Medium Risk (2 of 2 - PPSV23) 3/8/2017 GLAUCOMA SCREENING Q2Y 3/7/2018 MEDICARE YEARLY EXAM 4/19/2018 COLONOSCOPY 12/2/2024 Allergies as of 10/19/2017  Review Complete On: 10/19/2017 By: Floyd Gonsalez MD  
  
 Severity Noted Reaction Type Reactions Hydrochlorothiazide  06/08/2017    Rash Pcn [Penicillins]  06/01/2010    Other (comments) MOUTH SORE Current Immunizations  Reviewed on 10/3/2017 Name Date Influenza High Dose Vaccine PF 10/3/2017 Influenza Vaccine 10/1/2016, 3/8/2016, 10/21/2014 Pneumococcal Conjugate (PCV-13) 3/8/2016 Not reviewed this visit You Were Diagnosed With   
  
 Codes Comments Persistent atrial fibrillation (HCC)    -  Primary ICD-10-CM: I48.1 ICD-9-CM: 427.31 Essential hypertension     ICD-10-CM: I10 
ICD-9-CM: 401.9 RBBB     ICD-10-CM: I45.10 ICD-9-CM: 426.4 Vitals BP Pulse Height(growth percentile) Weight(growth percentile) SpO2 BMI  
 138/70 (BP 1 Location: Left arm, BP Patient Position: Sitting) 64 5' 10\" (1.778 m) 183 lb (83 kg) 99% 26.26 kg/m2 Smoking Status Former Smoker Vitals History BMI and BSA Data Body Mass Index Body Surface Area  
 26.26 kg/m 2 2.02 m 2 Preferred Pharmacy Pharmacy Name Phone [facility] [phone] Your Updated Medication List  
  
   
This list is accurate as of: 10/19/17  5:10 PM.  Always use your most recent med list.  
  
  
  
  
 dabigatran etexilate 150 mg capsule Commonly known as:  PRADAXA Take 1 Cap by mouth every twelve (12) hours. furosemide 20 mg tablet Commonly known as:  LASIX Take 1 Tab by mouth daily. lisinopril 20 mg tablet Commonly known as:  Pippa Sanderson Take 1 Tab by mouth daily. metoprolol tartrate 25 mg tablet Commonly known as:  LOPRESSOR Take 1 Tab by mouth two (2) times a day. Prescriptions Sent to Pharmacy Refills  
 furosemide (LASIX) 20 mg tablet 5 Sig: Take 1 Tab by mouth daily. Class: Normal  
 Pharmacy:  [facility] [address] Ph #: [phone] Route: Oral  
  
Follow-up Instructions Return in about 6 months (around 4/19/2018). Patient Instructions Please start lasix 20 mg daily You will need to follow up in clinic with Dr. Deysi Fink in 6 months. You have been scheduled for 4/12/17 at 8:00 am. 
 
 
 
  
Introducing [facility]! Dear Radha Salmeron: 
Thank you for requesting a Indotrading account. Our records indicate that you already have an active Indotrading account. You can access your account anytime at [email or web address] Did you know that you can access your hospital and ER discharge instructions at any time in Indotrading? You can also review all of your test results from your hospital stay or ER visit. Additional Information If you have questions, please visit the Frequently Asked Questions section of the Indotrading website at [email or web address] Remember, Indotrading is NOT to be used for urgent needs. For medical emergencies, dial 911. Now available from your iPhone and Android! Please provide this summary of care documentation to your next provider. Your primary care clinician is listed as MAIDA HARLEY. If you have any questions after today's visit, please call [phone].

## 2017-12-11 ENCOUNTER — OFFICE VISIT (OUTPATIENT)
Dept: FAMILY MEDICINE CLINIC | Age: 77
End: 2017-12-11

## 2017-12-11 ENCOUNTER — HOSPITAL ENCOUNTER (OUTPATIENT)
Dept: LAB | Age: 77
Discharge: HOME | End: 2017-12-11
Payer: MEDICARE

## 2017-12-11 VITALS
DIASTOLIC BLOOD PRESSURE: 96 MMHG | HEIGHT: 70 IN | TEMPERATURE: 98.2 F | HEART RATE: 58 BPM | BODY MASS INDEX: 26.2 KG/M2 | RESPIRATION RATE: 18 BRPM | WEIGHT: 183 LBS | SYSTOLIC BLOOD PRESSURE: 161 MMHG | OXYGEN SATURATION: 97 %

## 2017-12-11 DIAGNOSIS — I10 ESSENTIAL HYPERTENSION: ICD-10-CM

## 2017-12-11 DIAGNOSIS — E78.00 HYPERCHOLESTEREMIA: ICD-10-CM

## 2017-12-11 DIAGNOSIS — D69.6 THROMBOCYTOPENIA (HCC): ICD-10-CM

## 2017-12-11 DIAGNOSIS — R21 RASH: ICD-10-CM

## 2017-12-11 DIAGNOSIS — H25.9 SENILE CATARACT OF RIGHT EYE, UNSPECIFIED AGE-RELATED CATARACT TYPE: Primary | ICD-10-CM

## 2017-12-11 PROCEDURE — 80053 COMPREHEN METABOLIC PANEL: CPT

## 2017-12-11 PROCEDURE — 85025 COMPLETE CBC W/AUTO DIFF WBC: CPT

## 2017-12-11 PROCEDURE — 80061 LIPID PANEL: CPT

## 2017-12-11 RX ORDER — TRIAMCINOLONE ACETONIDE 1 MG/ML
LOTION TOPICAL 3 TIMES DAILY
Qty: 60 ML | Refills: 0 | Status: ON HOLD | OUTPATIENT
Start: 2017-12-11 | End: 2018-05-23

## 2017-12-11 NOTE — MR AVS SNAPSHOT
Visit Information Date & Time Provider Department Dept. Phone Encounter #  
 12/11/2017  8:50 AM Kelsey Preciado MD [address] [facility] [phone] [id number] Follow-up Instructions Return in about 6 months (around 6/11/2018), or if symptoms worsen or fail to improve. Your Appointments 12/11/2017  8:50 AM  
ESTABLISHED PATIENT with Kelsey Preciado MD  
[address] [facility] [address]) Appt Note: CPEadriane 11.22.17; Needs [address] [address]  
[phone]  
  
   
 [address] [address] [address] [address]  
  
    
 4/12/2018  8:00 AM  
ESTABLISHED PATIENT with Andrés Hicks MD  
[facility] ([address]) Appt Note: 6 month f/u  
 [address] [facility] [address] [place] [address]  
[address] [facility],[address] [facility] 7 [address] Upcoming Health Maintenance Date Due DTaP/Tdap/Td series (1 - Tdap) 3/27/1961 Pneumococcal 65+ Low/Medium Risk (2 of 2 - PPSV23) 3/8/2017 GLAUCOMA SCREENING Q2Y 3/7/2018 MEDICARE YEARLY EXAM 4/19/2018 COLONOSCOPY 12/2/2024 Allergies as of 12/11/2017  Review Complete On: 12/11/2017 By: Kelsey Preciado MD  
  
 Severity Noted Reaction Type Reactions Hydrochlorothiazide  06/08/2017    Rash Pcn [Penicillins]  06/01/2010    Other (comments) MOUTH SORE Current Immunizations  Reviewed on 10/3/2017 Name Date Influenza High Dose Vaccine PF 10/3/2017 Influenza Vaccine 10/1/2016, 3/8/2016, 10/21/2014 Pneumococcal Conjugate (PCV-13) 3/8/2016 Not reviewed this visit You Were Diagnosed With   
  
 Codes Comments Senile cataract of right eye, unspecified age-related cataract type    -  Primary ICD-10-CM: H25.9 ICD-9-CM: 366.10 Thrombocytopenia ([address]     ICD-10-CM: D69.6 ICD-9-CM: 287.5 Essential hypertension     ICD-10-CM: I10 
ICD-9-CM: 401.9  Hypercholesteremia     ICD-10-CM: E78.00 
 ICD-9-CM: 272.0 Vitals BP Pulse Temp Resp Height(growth percentile) Weight(growth percentile) (!) 161/96 (!) 58 98.2 °F (36.8 °C) (Oral) 18 5' 10\" (1.778 m) 183 lb (83 kg) SpO2 BMI Smoking Status 97% 26.26 kg/m2 Former Smoker BMI and BSA Data Body Mass Index Body Surface Area  
 26.26 kg/m 2 2.02 m 2 Preferred Pharmacy Pharmacy Name Phone [address] [phone] Your Updated Medication List  
  
   
This list is accurate as of: 12/11/17  8:48 AM.  Always use your most recent med list.  
  
  
  
  
 dabigatran etexilate 150 mg capsule Commonly known as:  PRADAXA Take 1 Cap by mouth every twelve (12) hours. * furosemide 20 mg tablet Commonly known as:  LASIX Take 1 Tab by mouth daily. * furosemide 20 mg tablet Commonly known as:  LASIX Take 1 Tab by mouth daily. lisinopril 20 mg tablet Commonly known as:  Thersia Kubas Take 1 Tab by mouth daily. metoprolol tartrate 25 mg tablet Commonly known as:  LOPRESSOR Take 1 Tab by mouth two (2) times a day. * Notice: This list has 2 medication(s) that are the same as other medications prescribed for you. Read the directions carefully, and ask your doctor or other care provider to review them with you. We Performed the Following CBC WITH AUTOMATED DIFF [59367 CPT(R)] LIPID PANEL [58259 CPT(R)] METABOLIC PANEL, COMPREHENSIVE [04250 CPT(R)] Follow-up Instructions Return in about 6 months (around 6/11/2018), or if symptoms worsen or fail to improve. Introducing [facility]! Dear Manpreet Feng: 
Thank you for requesting a Wine Ring account. Our records indicate that you already have an active Wine Ring account. You can access your account anytime at [email or web address] Did you know that you can access your hospital and ER discharge instructions at any time in [employer]? You can also review all of your test results from your hospital stay or ER visit. Additional Information If you have questions, please visit the Frequently Asked Questions section of the [employer] Books website at [email or web address] Remember, [employer] is NOT to be used for urgent needs. For medical emergencies, dial 911. Now available from your iPhone and Android! Please provide this summary of care documentation to your next provider. Your primary care clinician is listed as MAIDA HARLEY. If you have any questions after today's visit, please call [phone].

## 2017-12-11 NOTE — PROGRESS NOTES
1. Have you been to the ER, urgent care clinic since your last visit? Hospitalized since your last visit? No    2. Have you seen or consulted any other health care providers outside of the [address] [place] [address] since your last visit? Include any pap smears or colon screening. No     Chief Complaint   Patient presents with    Complete Physical    Labs Only    Annual Wellness Visit    Hypertension    Pre-op Exam    Cold Symptoms    Medication Evaluation     help with dry skin liquid     Pt presents to the office for CPE, Pre-Op Exam, cold symptoms, med eval - help with dry skin, LABs, AWV - Hx. HTN        Chief Complaint   Patient presents with    Complete Physical    Labs Only    Annual Wellness Visit    Hypertension    Pre-op Exam    Cold Symptoms    Medication Evaluation     help with dry skin liquid     He is a 76 y.o. male who presents for evalution. Reviewed PmHx, RxHx, FmHx, SocHx, AllgHx and updated and dated in the chart.     Patient Active Problem List    Diagnosis    Advanced care planning/counseling discussion     Asked pt to bring in      Thrombocytopenia ([address]    Advance care planning     Pt has one      Acne rosacea    Hypercholesteremia    HTN (hypertension)       Review of Systems - negative except as listed above in the HPI    Objective:     Vitals:    12/11/17 0837   BP: (!) 161/96   Pulse: (!) 58   Resp: 18   Temp: 98.2 °F (36.8 °C)   TempSrc: Oral   SpO2: 97%   Weight: 183 lb (83 kg)   Height: 5' 10\" (1.778 m)     Physical Examination: General appearance - alert, well appearing, and in no distress  Neck - supple, no significant adenopathy  Chest - clear to auscultation, no wheezes, rales or rhonchi, symmetric air entry  Heart - normal rate, regular rhythm, normal S1, S2, no murmurs, rubs, clicks or gallops  Abdomen - soft, nontender, nondistended, no masses or organomegaly  Extremities - peripheral pulses normal, no pedal edema, no clubbing or cyanosis    Assessment/ Plan:   Diagnoses and all orders for this visit:    1. Senile cataract of right eye, unspecified age-related cataract type  -forms filled out and faxed    2. Thrombocytopenia (HCC)  -     CBC WITH AUTOMATED DIFF  -stable    3. Essential hypertension  -     LIPID PANEL  -     METABOLIC PANEL, COMPREHENSIVE  -better at home per pt    4. Hypercholesteremia  -     LIPID PANEL  -     METABOLIC PANEL, COMPREHENSIVE       Follow-up Disposition:  Return in about 6 months (around 6/11/2018), or if symptoms worsen or fail to improve. I have discussed the diagnosis with the patient and the intended plan as seen in the above orders. The patient understands and agrees with the plan. The patient has received an after-visit summary and questions were answered concerning future plans. Medication Side Effects and Warnings were discussed with patient  Patient Labs were reviewed and or requested:  Patient Past Records were reviewed and or requested    Sharla Thomas M.D. There are no Patient Instructions on file for this visit.

## 2017-12-12 LAB
ALBUMIN SERPL-MCNC: 4 G/DL (ref 3.5–4.8)
ALBUMIN/GLOB SERPL: 1.5 {RATIO} (ref 1.2–2.2)
ALP SERPL-CCNC: 76 IU/L (ref 39–117)
ALT SERPL-CCNC: 20 IU/L (ref 0–44)
AST SERPL-CCNC: 24 IU/L (ref 0–40)
BASOPHILS # BLD AUTO: 0 X10E3/UL (ref 0–0.2)
BASOPHILS NFR BLD AUTO: 0 %
BILIRUB SERPL-MCNC: 0.6 MG/DL (ref 0–1.2)
BUN SERPL-MCNC: 13 MG/DL (ref 8–27)
BUN/CREAT SERPL: 10 (ref 10–24)
CALCIUM SERPL-MCNC: 9.3 MG/DL (ref 8.6–10.2)
CHLORIDE SERPL-SCNC: 100 MMOL/L (ref 96–106)
CHOLEST SERPL-MCNC: 178 MG/DL (ref 100–199)
CO2 SERPL-SCNC: 25 MMOL/L (ref 18–29)
CREAT SERPL-MCNC: 1.35 MG/DL (ref 0.76–1.27)
EOSINOPHIL # BLD AUTO: 0.3 X10E3/UL (ref 0–0.4)
EOSINOPHIL NFR BLD AUTO: 4 %
ERYTHROCYTE [DISTWIDTH] IN BLOOD BY AUTOMATED COUNT: 13.9 % (ref 12.3–15.4)
GFR SERPLBLD CREATININE-BSD FMLA CKD-EPI: 50 ML/MIN/1.73
GFR SERPLBLD CREATININE-BSD FMLA CKD-EPI: 57 ML/MIN/1.73
GLOBULIN SER CALC-MCNC: 2.6 G/DL (ref 1.5–4.5)
GLUCOSE SERPL-MCNC: 93 MG/DL (ref 65–99)
HCT VFR BLD AUTO: 44.6 % (ref 37.5–51)
HDLC SERPL-MCNC: 47 MG/DL
HGB BLD-MCNC: 14.9 G/DL (ref 13–17.7)
IMM GRANULOCYTES # BLD: 0 X10E3/UL (ref 0–0.1)
IMM GRANULOCYTES NFR BLD: 0 %
INTERPRETATION, 910389: NORMAL
INTERPRETATION: NORMAL
LDLC SERPL CALC-MCNC: 114 MG/DL (ref 0–99)
LYMPHOCYTES # BLD AUTO: 1.9 X10E3/UL (ref 0.7–3.1)
LYMPHOCYTES NFR BLD AUTO: 25 %
MCH RBC QN AUTO: 28.9 PG (ref 26.6–33)
MCHC RBC AUTO-ENTMCNC: 33.4 G/DL (ref 31.5–35.7)
MCV RBC AUTO: 86 FL (ref 79–97)
MONOCYTES # BLD AUTO: 0.7 X10E3/UL (ref 0.1–0.9)
MONOCYTES NFR BLD AUTO: 9 %
NEUTROPHILS # BLD AUTO: 4.5 X10E3/UL (ref 1.4–7)
NEUTROPHILS NFR BLD AUTO: 62 %
PDF IMAGE, 910387: NORMAL
PLATELET # BLD AUTO: 149 X10E3/UL (ref 150–379)
POTASSIUM SERPL-SCNC: 4.4 MMOL/L (ref 3.5–5.2)
PROT SERPL-MCNC: 6.6 G/DL (ref 6–8.5)
RBC # BLD AUTO: 5.16 X10E6/UL (ref 4.14–5.8)
SODIUM SERPL-SCNC: 139 MMOL/L (ref 134–144)
TRIGL SERPL-MCNC: 83 MG/DL (ref 0–149)
VLDLC SERPL CALC-MCNC: 17 MG/DL (ref 5–40)
WBC # BLD AUTO: 7.4 X10E3/UL (ref 3.4–10.8)

## 2018-01-24 RX ORDER — METOPROLOL TARTRATE 25 MG/1
TABLET, FILM COATED ORAL
Qty: 60 TAB | Refills: 5 | Status: SHIPPED | OUTPATIENT
Start: 2018-01-24 | End: 2018-03-01

## 2018-01-24 NOTE — TELEPHONE ENCOUNTER
Request for Lopressor 25 mg BID. Last office visit 10/19/17, next office visit 4/12/18. Refills per verbal order from Dr. Colton Gaines.

## 2018-02-21 ENCOUNTER — TELEPHONE (OUTPATIENT)
Dept: CARDIOLOGY CLINIC | Age: 78
End: 2018-02-21

## 2018-02-21 NOTE — TELEPHONE ENCOUNTER
----- Message from Ralph Vazquez LPN sent at 9/61/4726  6:53 AM EST -----  Regarding: FW: Prescription Question  Contact: [phone]      ----- Message -----     From: Paulie Bush     Sent: 2/20/2018   9:55 PM       To: [facility] Nurse Pool  Subject: Prescription Question                            2/20/2018------ Can we consider changing the Metoprolol medication or dosage? Since I started this during (October 2017), I have developed insomnia-- trouble falling to sleep and sleeping thru the night. Should I get a Doctors appointment to discuss with you? My insurance has not covered Pradaxa after since 1 January 2018, and I only have 14 pill- days remaining, until I need a new prescription. [facility] ([phone]) can fill a 90 day insurance covered prescription for  Elliquis, or Xarelto. Not sure about warfarin.   Thank you,  Isra Espinal;  [phone]:  [address] [address], [facility]

## 2018-02-22 NOTE — TELEPHONE ENCOUNTER
MyChart message sent with recommendations. Scheduled patient appointment to discuss medication changes.

## 2018-03-01 ENCOUNTER — OFFICE VISIT (OUTPATIENT)
Dept: CARDIOLOGY CLINIC | Age: 78
End: 2018-03-01

## 2018-03-01 VITALS
DIASTOLIC BLOOD PRESSURE: 80 MMHG | HEIGHT: 70 IN | WEIGHT: 178 LBS | SYSTOLIC BLOOD PRESSURE: 162 MMHG | BODY MASS INDEX: 25.48 KG/M2 | HEART RATE: 78 BPM

## 2018-03-01 DIAGNOSIS — I48.19 PERSISTENT ATRIAL FIBRILLATION (HCC): Primary | ICD-10-CM

## 2018-03-01 DIAGNOSIS — I45.10 RBBB: ICD-10-CM

## 2018-03-01 DIAGNOSIS — I10 ESSENTIAL HYPERTENSION: ICD-10-CM

## 2018-03-01 RX ORDER — DILTIAZEM HYDROCHLORIDE 180 MG/1
180 CAPSULE, COATED, EXTENDED RELEASE ORAL DAILY
Qty: 30 CAP | Refills: 6 | Status: SHIPPED | OUTPATIENT
Start: 2018-03-01 | End: 2018-09-26 | Stop reason: SDUPTHER

## 2018-03-01 NOTE — PATIENT INSTRUCTIONS
Stop Pradaxa and Lopressor. Start Diltiazem 180mg every day and Eliquis 5mg twice a day after stopping Pradaxa for 3 days. Keep follow up appointment.    Future Appointments  Date Time Provider Sancho Eden   4/12/2018 8:00 AM Ezio Packer  [address]

## 2018-03-01 NOTE — PROGRESS NOTES
Cardiac Electrophysiology Office Note     Subjective:      January Patricia is a 76 y.o. male patient who is seen for evaluation of atrial fibrillation   He had cataract surgery  and it revealed an irregular heart beat when the anesthesiologist hooked him to monitor. He then went to his PCP, Dr Pat Christine, who dx him with atrial fibrillation on 12 lead ECG. He is kindly referred by Dr. Kleber Lima. Patient is the patient has been rate controlled with metoprolol but that caused insomnia. He also facing the higher cost with Pradaxa this year because [facility] does not accept that on formulary anymore. He wants to change the oral anticoagulant. At night he noted that his heart rate went up to 110- 120 and sometimes 130 bpm so he does not think the Lopressor is effective either    Echo 2017 with moderate MR TR and ATILIO    PMhx hypertension. No DM, CHF, MI. His wife has atrial fibrillation and she takes coumadin. Social hx: no tobacco. 1-2 beers daily. He plays golf. [employer]. . Family hx: No family hx of CAD. Parents  of old age    Patient Active Problem List   Diagnosis Code    Acne rosacea L71.9    Hypercholesteremia E78.00    HTN (hypertension) I10    Thrombocytopenia (HCC) D69.6    Advance care planning Z71.89    Advanced care planning/counseling discussion Z71.89     Current Outpatient Prescriptions   Medication Sig Dispense Refill    metoprolol tartrate (LOPRESSOR) 25 mg tablet TAKE 1 TABLET BY MOUTH TWICE DAILY 60 Tab 5    triamcinolone (KENALOG) 0.1 % lotion Apply  to affected area three (3) times daily. use thin layer 60 mL 0    furosemide (LASIX) 20 mg tablet Take 1 Tab by mouth daily. 30 Tab 5    dabigatran etexilate (PRADAXA) 150 mg capsule Take 1 Cap by mouth every twelve (12) hours. 60 Cap 6    lisinopril (PRINIVIL, ZESTRIL) 20 mg tablet Take 1 Tab by mouth daily.  90 Tab 3     Allergies   Allergen Reactions    Hydrochlorothiazide Rash    Pcn [Penicillins] Other (comments)     MOUTH SORE     Past Medical History:   Diagnosis Date    Acne rosacea 6/1/2010    HTN (hypertension) 6/1/2010    Hypercholesteremia 6/1/2010     No past surgical history   Family History   Problem Relation Age of Onset    Lung Disease Father      Social History   Substance Use Topics    Smoking status: Former Smoker    Smokeless tobacco: Never Used      Comment: 1970    Alcohol use Yes      Review of Systems:   Constitutional: Negative for fever, chills, weight loss, + malaise/fatigue. HEENT: Negative for nosebleeds, vision changes. Respiratory: Negative for cough, hemoptysis, sputum production, and wheezing. Cardiovascular: Negative for chest pain, +palpitations, no orthopnea, claudication, no syncope, and PND. Gastrointestinal: Negative for nausea, vomiting, diarrhea, constipation, blood in stool and melena. Genitourinary: Negative for dysuria, and hematuria. Musculoskeletal: Negative for myalgias, +arthralgia. Skin: Negative for rash. Heme: Does not bleed or +bruise easily. Neurological: Negative for speech change and focal weakness     Objective:     Visit Vitals    /80 (BP 1 Location: Left arm, BP Patient Position: Sitting)    Pulse 78    Ht 5' 10\" (1.778 m)    Wt 178 lb (80.7 kg)    BMI 25.54 kg/m2      Physical Exam:   Constitutional: Well-nourished. No distress. Head: Normocephalic and atraumatic. Eyes: Pupils are equal, round   Neck: supple. No JVD present. Cardiovascular: Normal rate, irregular rhythm. Exam reveals no gallop and no friction rub. No murmur heard. Pulmonary/Chest: Effort normal and breath sounds normal. No wheezes. Abdominal: Soft, no tenderness. Musculoskeletal: Trace edema   Neurological: alert,oriented. Skin: Skin is warm and dry, bruises on the hands  Psychiatric: normal mood and affect. Behavior is normal. Judgment and thought content normal.   Assessment/Plan:       ICD-10-CM ICD-9-CM    1. Persistent atrial fibrillation (HCC) I48.1 427.31    2. RBBB I45.10 426.4    3. Essential hypertension I10 401.9      Discussed rate and rhythm control for atrial fibrillation. He is currently more symptomatic   The patient cannot tolerate beta-blocker. I will change over to Cardizem and we was started 180 mg once a day because he has elevated blood pressure as well. I discussed with him about cardioversion if he cannot tolerate the Cardizem he does not want to do that because of the MR TR and moderate atrial enlargement, the patient's preferring the catheter ablation and does not want to use antiarrhythmic. He is not enthusiastic about having pacemaker; sometimes he noted that his heart rate dropped down to 40 bpm on medication. I explained to him that we will reevaluate this with the next visit in about a month and a half and I will change his Pradaxa to Eliquis because of the formulary on his [address] insurance        Thank you for involving me in this patient's care and please call with further concerns or questions. Isabella Bush M.D.   Electrophysiology/Cardiology  [facility] [place]  [address] [facility] [address]  [place] [address] [address]                             [address] [place] [address]  [phone]

## 2018-03-01 NOTE — MR AVS SNAPSHOT
[address] [facility] [address] [place] [address] 
[phone] Patient: Maria Eugenia Albrecht MRN: [id number] SIX:[birth year] Visit Information Date & Time Provider Department Dept. Phone Encounter #  
 3/1/2018  1:40 PM Jose Dalton MD [facility] [phone] [id number] Follow-up Instructions Return in about 6 months (around 9/1/2018). Your Appointments 4/12/2018  8:00 AM  
ESTABLISHED PATIENT with Jose Dalton MD  
[facility] ([facility]) Appt Note: 6 month f/u  
 [address] [place] [address]  
[address] [address] Upcoming Health Maintenance Date Due DTaP/Tdap/Td series (1 - Tdap) 3/27/1961 Pneumococcal 65+ Low/Medium Risk (2 of 2 - PPSV23) 3/8/2017 GLAUCOMA SCREENING Q2Y 3/7/2018 MEDICARE YEARLY EXAM 4/19/2018 COLONOSCOPY 12/2/2024 Allergies as of 3/1/2018  Review Complete On: 3/1/2018 By: Jose Dalton MD  
  
 Severity Noted Reaction Type Reactions Hydrochlorothiazide  06/08/2017    Rash Pcn [Penicillins]  06/01/2010    Other (comments) MOUTH SORE Current Immunizations  Reviewed on 10/3/2017 Name Date Influenza High Dose Vaccine PF 10/3/2017 Influenza Vaccine 10/1/2016, 3/8/2016, 10/21/2014 Pneumococcal Conjugate (PCV-13) 3/8/2016 Not reviewed this visit You Were Diagnosed With   
  
 Codes Comments Persistent atrial fibrillation (HCC)    -  Primary ICD-10-CM: I48.1 ICD-9-CM: 427.31   
 RBBB     ICD-10-CM: I45.10 ICD-9-CM: 426.4 Essential hypertension     ICD-10-CM: I10 
ICD-9-CM: 401.9 Vitals BP Pulse Height(growth percentile) Weight(growth percentile) BMI Smoking Status 162/80 (BP 1 Location: Left arm, BP Patient Position: Sitting) 78 5' 10\" (1.778 m) 178 lb (80.7 kg) 25.54 kg/m2 Former Smoker Vitals History BMI and BSA Data Body Mass Index Body Surface Area 25.54 kg/m 2 2 m 2 Preferred Pharmacy Pharmacy Name Phone [address] [place] [address] [address] [place] [address] [phone] Your Updated Medication List  
  
   
This list is accurate as of 3/1/18  2:19 PM.  Always use your most recent med list.  
  
  
  
  
 * apixaban 5 mg tablet Commonly known as:  Neftali Bury Take 1 Tab by mouth two (2) times a day. * apixaban 5 mg tablet Commonly known as:  Neftali Bury Take 1 Tab by mouth two (2) times a day. dilTIAZem  mg ER capsule Commonly known as:  CARDIZEM CD Take 1 Cap by mouth daily. furosemide 20 mg tablet Commonly known as:  LASIX Take 1 Tab by mouth daily. lisinopril 20 mg tablet Commonly known as:  Thomas Jose Take 1 Tab by mouth daily. triamcinolone 0.1 % lotion Commonly known as:  KENALOG Apply  to affected area three (3) times daily. use thin layer * Notice: This list has 2 medication(s) that are the same as other medications prescribed for you. Read the directions carefully, and ask your doctor or other care provider to review them with you. Prescriptions Sent to Pharmacy Refills  
 apixaban (ELIQUIS) 5 mg tablet 6 Sig: Take 1 Tab by mouth two (2) times a day. Class: Normal  
 Pharmacy: [facility] [address] [place] [address] AT [place] [address] Ph #: [phone] Route: Oral  
 dilTIAZem CD (CARDIZEM CD) 180 mg ER capsule 6 Sig: Take 1 Cap by mouth daily. Class: Normal  
 Pharmacy: [facility] [address] [place] [address] [address] [place] [address] Ph #: [phone] Route: Oral  
  
Follow-up Instructions Return in about 6 months (around 9/1/2018). Patient Instructions Stop Pradaxa and Lopressor. Start Diltiazem 180mg every day and Eliquis 5mg twice a day after stopping Pradaxa for 3 days. Keep follow up appointment. Future Appointments Date Time Provider Sancho Harmon 4/12/2018 8:00 AM Debbie Hawkins  [address] [facility]! Dear Jahaira Duong: 
Thank you for requesting a CashEdge account. Our records indicate that you already have an active CashEdge account. You can access your account anytime at [email or web address] Did you know that you can access your hospital and ER discharge instructions at any time in CashEdge? You can also review all of your test results from your hospital stay or ER visit. Additional Information If you have questions, please visit the Frequently Asked Questions section of the CashEdge website at [email or web address] Remember, CashEdge is NOT to be used for urgent needs. For medical emergencies, dial 911. Now available from your iPhone and Android! Please provide this summary of care documentation to your next provider. Your primary care clinician is listed as MAIDA HARLEY. If you have any questions after today's visit, please call [phone].

## 2018-03-01 NOTE — PROGRESS NOTES
Chief Complaint   Patient presents with    Hypertension    Follow-up     to discuss changing metoprolol     Verified patient with two types of identifiers. Verified medications with the patient. Verified pharmacy with patient.

## 2018-04-12 ENCOUNTER — OFFICE VISIT (OUTPATIENT)
Dept: CARDIOLOGY CLINIC | Age: 78
End: 2018-04-12

## 2018-04-12 VITALS
SYSTOLIC BLOOD PRESSURE: 148 MMHG | HEIGHT: 70 IN | BODY MASS INDEX: 26.2 KG/M2 | DIASTOLIC BLOOD PRESSURE: 72 MMHG | WEIGHT: 183 LBS | HEART RATE: 87 BPM

## 2018-04-12 DIAGNOSIS — I45.10 RBBB: ICD-10-CM

## 2018-04-12 DIAGNOSIS — I49.3 ASYMPTOMATIC PVCS: ICD-10-CM

## 2018-04-12 DIAGNOSIS — I48.19 PERSISTENT ATRIAL FIBRILLATION (HCC): Primary | ICD-10-CM

## 2018-04-12 DIAGNOSIS — Z01.812 PRE-PROCEDURE LAB EXAM: ICD-10-CM

## 2018-04-12 DIAGNOSIS — I10 ESSENTIAL HYPERTENSION: ICD-10-CM

## 2018-04-12 NOTE — MR AVS SNAPSHOT
[address] 350 [facility] 
[phone] Patient: Katt Zuniga MRN: [id number] LKY:[birth year] Visit Information Date & Time Provider Department Dept. Phone Encounter #  
 4/12/2018  8:00 AM Hood Kilgore MD [facility] Sheryle Pesa [phone] [id number] Follow-up Instructions Return in about 4 weeks (around 5/10/2018). Follow-up and Disposition History Your Appointments 6/7/2018  8:00 AM  
ESTABLISHED PATIENT with Hood Kilgore MD  
[facility] ([address]) Appt Note: 2 week f/u from a-fib ablation/EKG needed [address] 350 [facility]  
[address] [facility],[address] [phone] Upcoming Health Maintenance Date Due DTaP/Tdap/Td series (1 - Tdap) 3/27/1961 Pneumococcal 65+ Low/Medium Risk (2 of 2 - PPSV23) 3/8/2017 GLAUCOMA SCREENING Q2Y 3/7/2018 MEDICARE YEARLY EXAM 4/19/2018 COLONOSCOPY 12/2/2024 Allergies as of 4/12/2018  Review Complete On: 4/12/2018 By: Hood Kilgore MD  
  
 Severity Noted Reaction Type Reactions Hydrochlorothiazide  06/08/2017    Rash Pcn [Penicillins]  06/01/2010    Other (comments) MOUTH SORE Current Immunizations  Reviewed on 10/3/2017 Name Date Influenza High Dose Vaccine PF 10/3/2017 Influenza Vaccine 10/1/2016, 3/8/2016, 10/21/2014 Pneumococcal Conjugate (PCV-13) 3/8/2016 Not reviewed this visit You Were Diagnosed With   
  
 Codes Comments Persistent atrial fibrillation (HCC)    -  Primary ICD-10-CM: I48.1 ICD-9-CM: 427.31   
 RBBB     ICD-10-CM: I45.10 ICD-9-CM: 426.4 Essential hypertension     ICD-10-CM: I10 
ICD-9-CM: 401.9 Asymptomatic PVCs     ICD-10-CM: I49.3 ICD-9-CM: 427.69 Pre-procedure lab exam     ICD-10-CM: K95.259 ICD-9-CM: V72.63 Vitals BP Pulse Height(growth percentile) Weight(growth percentile) BMI Smoking Status 148/72 (BP 1 Location: Right arm, BP Patient Position: Sitting) 87 5' 10\" (1.778 m) 183 lb (83 kg) 26.26 kg/m2 Former Smoker Vitals History BMI and BSA Data Body Mass Index Body Surface Area  
 26.26 kg/m 2 2.02 m 2 Preferred Pharmacy Pharmacy Name Phone  [address] [phone] Your Updated Medication List  
  
   
This list is accurate as of 4/12/18  8:30 AM.  Always use your most recent med list.  
  
  
  
  
 apixaban 5 mg tablet Commonly known as:  Verlee Infield Take 1 Tab by mouth two (2) times a day. dilTIAZem  mg ER capsule Commonly known as:  CARDIZEM CD Take 1 Cap by mouth daily. furosemide 20 mg tablet Commonly known as:  LASIX Take 1 Tab by mouth daily. lisinopril 20 mg tablet Commonly known as:  Velora Oscar Take 1 Tab by mouth daily. triamcinolone 0.1 % lotion Commonly known as:  KENALOG Apply  to affected area three (3) times daily. use thin layer Prescriptions Sent to Pharmacy Refills  
 apixaban (ELIQUIS) 5 mg tablet 1 Sig: Take 1 Tab by mouth two (2) times a day. Class: Normal  
 Pharmacy: [facility] [address] Ph #: [phone] Route: Oral  
  
Follow-up Instructions Return in about 4 weeks (around 5/10/2018). To-Do List   
 04/12/2018 Imaging:  CTA CHEST W OR W WO CONT   
  
 04/23/2018 Lab:  CBC WITH AUTOMATED DIFF   
  
 04/23/2018 Lab:  METABOLIC PANEL, BASIC Patient Instructions Your EP study and a-fib ablation procedure has been scheduled for 5/23/18 at 1:00pm, at [address] 
 
Please report to Admitting Department by 11:00am, or 2 hours prior to your scheduled procedure. Please bring a list of your current medications and medication bottles, if able, to the hospital on this day.   You will be unable to drive after your procedure so please make sure to bring someone with you to your procedure. You will need to have nothing to eat or drink after midnight, the night prior to your procedure. You may have small sips of water, if needed, to take with your medication. You will need labs drawn prior to your procedure. Please go to [facility] to have this done no sooner than 4/23/18 and no later than 5/20/18. You will be contacted by the hospital to schedule a CT scan of the heart. You should stop your medication, Eliquis, 2 days prior to your scheduled procedure. After your procedure, you will need to follow up with Dr. Queta Decker. Your follow-up appointment has been scheduled for 6/7/18 at 8:00am.  
 
 
 
 
 
 
 Patient Instructions History Introducing [facility]! Dear Ching Elias: 
Thank you for requesting a [email or web address] account. Our records indicate that you already have an active [email or web address] account. You can access your account anytime at [email or web address] Did you know that you can access your hospital and ER discharge instructions at any time in [email or web address]? You can also review all of your test results from your hospital stay or ER visit. Additional Information If you have questions, please visit the Frequently Asked Questions section of the [email or web address] website at [email or web address] Remember, [email or web address] is NOT to be used for urgent needs. For medical emergencies, dial 911. Now available from your iPhone and Android! Please provide this summary of care documentation to your next provider. Your primary care clinician is listed as MAIDA HARLEY. If you have any questions after today's visit, please call [phone].

## 2018-04-12 NOTE — PROGRESS NOTES
Cardiac Electrophysiology Office Note     Subjective:      Judah Johnson is a [de-identified] y.o. male patient who is seen for evaluation of atrial fibrillation   He was kindly referred by Dr. Katt Avitia. Patient is on rate control medication. He feels tired when he played golf for exercise. He does not like the medication because it causes blood pressure sometimes dropping down to low. He denies syncope. The patient taking anticoagulation Eliquis and complained about easy bruising but no GI or  bleeding  Echo 2017 with moderate MR TR and ATILIO    PMhx hypertension. No DM, CHF, MI. His wife has atrial fibrillation and she takes coumadin. Social hx: no tobacco. 1-2 beers daily. He plays golf. [employer]. . Family hx: No family hx of CAD. Parents  of old age    Patient Active Problem List   Diagnosis Code    Acne rosacea L71.9    Hypercholesteremia E78.00    HTN (hypertension) I10    Thrombocytopenia (HCC) D69.6    Advance care planning Z71.89    Advanced care planning/counseling discussion Z71.89     Current Outpatient Prescriptions   Medication Sig Dispense Refill    apixaban (ELIQUIS) 5 mg tablet Take 1 Tab by mouth two (2) times a day. 180 Tab 1    dilTIAZem CD (CARDIZEM CD) 180 mg ER capsule Take 1 Cap by mouth daily. 30 Cap 6    furosemide (LASIX) 20 mg tablet Take 1 Tab by mouth daily. 30 Tab 5    lisinopril (PRINIVIL, ZESTRIL) 20 mg tablet Take 1 Tab by mouth daily. 90 Tab 3    triamcinolone (KENALOG) 0.1 % lotion Apply  to affected area three (3) times daily.  use thin layer 60 mL 0     Allergies   Allergen Reactions    Hydrochlorothiazide Rash    Pcn [Penicillins] Other (comments)     MOUTH SORE     Past Medical History:   Diagnosis Date    Acne rosacea 2010    HTN (hypertension) 2010    Hypercholesteremia 2010     No past surgical history   Family History   Problem Relation Age of Onset    Lung Disease Father      Social History   Substance Use Topics  Smoking status: Former Smoker    Smokeless tobacco: Never Used      Comment: 1968    Alcohol use Yes      Review of Systems:   Constitutional: Negative for fever, chills, weight loss, + malaise/fatigue. HEENT: Negative for nosebleeds, vision changes. Respiratory: Negative for cough, hemoptysis, sputum production, and wheezing. Cardiovascular: Negative for chest pain, +palpitations, no orthopnea, claudication, no syncope, and PND. Gastrointestinal: Negative for nausea, vomiting, diarrhea, constipation, blood in stool and melena. Genitourinary: Negative for dysuria, and hematuria. Musculoskeletal: Negative for myalgias, +arthralgia. Skin: Negative for rash. Heme: Does not bleed or +bruise easily. Neurological: Negative for speech change and focal weakness     Objective:     Visit Vitals    /72 (BP 1 Location: Right arm, BP Patient Position: Sitting)  Comment: home cuff 172/103    Pulse 87    Ht 5' 10\" (1.778 m)    Wt 183 lb (83 kg)    BMI 26.26 kg/m2      Physical Exam:   Constitutional: Well-nourished. No distress. Head: Normocephalic and atraumatic. Eyes: Pupils are equal, round   Neck: supple. No JVD present. Cardiovascular: Normal rate, irregular rhythm. Exam reveals no gallop and no friction rub. No murmur heard. Pulmonary/Chest: Effort normal and breath sounds normal. No wheezes. Abdominal: Soft, no tenderness. Musculoskeletal: Trace edema   Neurological: alert,oriented. Skin: Skin is warm and dry, bruises on the hands  Psychiatric: normal mood and affect. Behavior is normal. Judgment and thought content normal.   Assessment/Plan:       ICD-10-CM ICD-9-CM    1. Persistent atrial fibrillation (HCC) I48.1 427.31    2. RBBB I45.10 426.4    3. Essential hypertension I10 401.9    4. Asymptomatic PVCs I49.3 427.69      Discussed rate and rhythm control for atrial fibrillation. He is currently more symptomatic   The patient cannot tolerate beta-blocker.   I changed over to Cardizem and he did not think it was beneficial and his blood pressure was fluctuating quite a bit as well. I discussed with him about cardioversion and add antiarrhythmic medication but the patient's preferring the catheter ablation and does not want to use antiarrhythmic. He is not enthusiastic about having pacemaker; sometimes he noted that his heart rate dropped down to 40 bpm on medication. For now he want to continue with the Eliquis and not changing to Pradaxa  We have discussed the risks and benefit of catheter ablation for atrial fibrillation the success and failure and he want to proceed with that  He wants to do that in the second part of May because he has a vacation 10 day trip  Risks include but are not limited to bleeding in the groin, infection in the groin and/or heart valves, fistula between the groin arteries and veins, valvular damage, diaphragmatic paralysis, aortic dissection, heart attack, stroke, blood clot in the leg, pulmonary embolism, lung collapse (pneumothorax or hemothorax), heart collapse (pericardial tamponade), death. The added risks for left atrial ablation may be atrial-esophageal fistula, pulmonary vein stenoses, kidney failure (from contrast injection), higher risk of bleeding, stroke and heart attack. Elective or emergency surgery may be required to repair some of these complications. Prolonged hospitalization would be required. General anesthesia and transeptal catheterization may be required for the procedure  Thank you for involving me in this patient's care and please call with further concerns or questions. Kari Stephen M.D.   Electrophysiology/Cardiology  [facility] [place]  [address], [address] , [address], [address] [place] [address]  [phone]

## 2018-04-12 NOTE — PATIENT INSTRUCTIONS
Your EP study and a-fib ablation procedure has been scheduled for 5/23/18 at 1:00pm, at [facility].    Please report to Admitting Department by 11:00am, or 2 hours prior to your scheduled procedure. Please bring a list of your current medications and medication bottles, if able, to the hospital on this day. You will be unable to drive after your procedure so please make sure to bring someone with you to your procedure. You will need to have nothing to eat or drink after midnight, the night prior to your procedure. You may have small sips of water, if needed, to take with your medication. You will need labs drawn prior to your procedure. Please go to [facility] to have this done no sooner than 4/23/18 and no later than 5/20/18. You will be contacted by the hospital to schedule a CT scan of the heart. You should stop your medication, Eliquis, 2 days prior to your scheduled procedure. After your procedure, you will need to follow up with Dr. Charmayne Angst.  Your follow-up appointment has been scheduled for 6/7/18 at 8:00am.

## 2018-04-23 DIAGNOSIS — I10 ESSENTIAL HYPERTENSION: ICD-10-CM

## 2018-04-23 DIAGNOSIS — I48.19 PERSISTENT ATRIAL FIBRILLATION (HCC): ICD-10-CM

## 2018-04-23 DIAGNOSIS — Z01.812 PRE-PROCEDURE LAB EXAM: ICD-10-CM

## 2018-04-23 DIAGNOSIS — I45.10 RBBB: ICD-10-CM

## 2018-04-23 DIAGNOSIS — I49.3 ASYMPTOMATIC PVCS: ICD-10-CM

## 2018-05-03 ENCOUNTER — HOSPITAL ENCOUNTER (OUTPATIENT)
Dept: LAB | Age: 78
Discharge: HOME | End: 2018-05-03
Payer: MEDICARE

## 2018-05-03 PROCEDURE — 85025 COMPLETE CBC W/AUTO DIFF WBC: CPT

## 2018-05-03 PROCEDURE — 80048 BASIC METABOLIC PNL TOTAL CA: CPT

## 2018-05-03 PROCEDURE — 36415 COLL VENOUS BLD VENIPUNCTURE: CPT

## 2018-05-04 LAB
BASOPHILS # BLD AUTO: 0 X10E3/UL (ref 0–0.2)
BASOPHILS NFR BLD AUTO: 1 %
BUN SERPL-MCNC: 17 MG/DL (ref 8–27)
BUN/CREAT SERPL: 12 (ref 10–24)
CALCIUM SERPL-MCNC: 9.5 MG/DL (ref 8.6–10.2)
CHLORIDE SERPL-SCNC: 99 MMOL/L (ref 96–106)
CO2 SERPL-SCNC: 26 MMOL/L (ref 18–29)
CREAT SERPL-MCNC: 1.42 MG/DL (ref 0.76–1.27)
EOSINOPHIL # BLD AUTO: 0.3 X10E3/UL (ref 0–0.4)
EOSINOPHIL NFR BLD AUTO: 5 %
ERYTHROCYTE [DISTWIDTH] IN BLOOD BY AUTOMATED COUNT: 13.4 % (ref 12.3–15.4)
GFR SERPLBLD CREATININE-BSD FMLA CKD-EPI: 46 ML/MIN/1.73
GFR SERPLBLD CREATININE-BSD FMLA CKD-EPI: 54 ML/MIN/1.73
GLUCOSE SERPL-MCNC: 98 MG/DL (ref 65–99)
HCT VFR BLD AUTO: 45.2 % (ref 37.5–51)
HGB BLD-MCNC: 14.9 G/DL (ref 13–17.7)
IMM GRANULOCYTES # BLD: 0 X10E3/UL (ref 0–0.1)
IMM GRANULOCYTES NFR BLD: 0 %
INTERPRETATION: NORMAL
LYMPHOCYTES # BLD AUTO: 1.8 X10E3/UL (ref 0.7–3.1)
LYMPHOCYTES NFR BLD AUTO: 27 %
MCH RBC QN AUTO: 28.5 PG (ref 26.6–33)
MCHC RBC AUTO-ENTMCNC: 33 G/DL (ref 31.5–35.7)
MCV RBC AUTO: 87 FL (ref 79–97)
MONOCYTES # BLD AUTO: 0.7 X10E3/UL (ref 0.1–0.9)
MONOCYTES NFR BLD AUTO: 10 %
NEUTROPHILS # BLD AUTO: 3.8 X10E3/UL (ref 1.4–7)
NEUTROPHILS NFR BLD AUTO: 57 %
PLATELET # BLD AUTO: 149 X10E3/UL (ref 150–379)
POTASSIUM SERPL-SCNC: 4.4 MMOL/L (ref 3.5–5.2)
RBC # BLD AUTO: 5.22 X10E6/UL (ref 4.14–5.8)
SODIUM SERPL-SCNC: 139 MMOL/L (ref 134–144)
WBC # BLD AUTO: 6.6 X10E3/UL (ref 3.4–10.8)

## 2018-05-14 ENCOUNTER — HOSPITAL ENCOUNTER (OUTPATIENT)
Dept: CT IMAGING | Age: 78
Discharge: HOME | End: 2018-05-14
Attending: INTERNAL MEDICINE
Payer: MEDICARE

## 2018-05-14 DIAGNOSIS — Z01.812 PRE-PROCEDURE LAB EXAM: ICD-10-CM

## 2018-05-14 DIAGNOSIS — I10 ESSENTIAL HYPERTENSION: ICD-10-CM

## 2018-05-14 DIAGNOSIS — I48.19 PERSISTENT ATRIAL FIBRILLATION (HCC): ICD-10-CM

## 2018-05-14 DIAGNOSIS — I49.3 ASYMPTOMATIC PVCS: ICD-10-CM

## 2018-05-14 DIAGNOSIS — I45.10 RBBB: ICD-10-CM

## 2018-05-14 PROCEDURE — 74011636320 HC RX REV CODE- 636/320: Performed by: INTERNAL MEDICINE

## 2018-05-14 PROCEDURE — 71275 CT ANGIOGRAPHY CHEST: CPT

## 2018-05-14 PROCEDURE — 74011000258 HC RX REV CODE- 258: Performed by: INTERNAL MEDICINE

## 2018-05-14 RX ORDER — SODIUM CHLORIDE 0.9 % (FLUSH) 0.9 %
10 SYRINGE (ML) INJECTION
Status: COMPLETED | OUTPATIENT
Start: 2018-05-14 | End: 2018-05-14

## 2018-05-14 RX ADMIN — SODIUM CHLORIDE 100 ML: 900 INJECTION, SOLUTION INTRAVENOUS at 11:45

## 2018-05-14 RX ADMIN — IOPAMIDOL 100 ML: 755 INJECTION, SOLUTION INTRAVENOUS at 11:45

## 2018-05-14 RX ADMIN — Medication 10 ML: at 11:45

## 2018-05-16 NOTE — PROGRESS NOTES
Chest CT:   Normal pulmonary veins (4)  LA appendage hazy but not consistent with thrombus  Coronary calcium seen with LAD     Planned for procedure    5/23/2018  1:15 PM    CATH ROOM 2 [facility]  6/7/2018   8:00 AM    MD [facility] Center[facility]

## 2018-05-16 NOTE — PROGRESS NOTES
Chest CT:   Normal pulmonary veins (4)  LA appendage hazy but not consistent with thrombus  Coronary calcium seen with LAD     Planned for procedure    5/23/2018  1:15 PM    CATH ROOM 2 [facility]  6/7/2018   8:00 AM    MD Linden Blackjayson

## 2018-05-18 RX ORDER — SODIUM CHLORIDE 0.9 % (FLUSH) 0.9 %
5-10 SYRINGE (ML) INJECTION AS NEEDED
Status: CANCELLED | OUTPATIENT
Start: 2018-05-18

## 2018-05-18 RX ORDER — SODIUM CHLORIDE 0.9 % (FLUSH) 0.9 %
5-10 SYRINGE (ML) INJECTION EVERY 8 HOURS
Status: CANCELLED | OUTPATIENT
Start: 2018-05-18

## 2018-05-23 ENCOUNTER — HOSPITAL ENCOUNTER (OUTPATIENT)
Dept: CARDIAC CATH/INVASIVE PROCEDURES | Age: 78
Setting detail: OBSERVATION
Discharge: HOME | End: 2018-05-24
Attending: INTERNAL MEDICINE | Admitting: INTERNAL MEDICINE
Payer: MEDICARE

## 2018-05-23 ENCOUNTER — ANESTHESIA (OUTPATIENT)
Dept: CARDIAC CATH/INVASIVE PROCEDURES | Age: 78
End: 2018-05-23
Payer: MEDICARE

## 2018-05-23 ENCOUNTER — ANESTHESIA EVENT (OUTPATIENT)
Dept: CARDIAC CATH/INVASIVE PROCEDURES | Age: 78
End: 2018-05-23
Payer: MEDICARE

## 2018-05-23 PROBLEM — Z86.79 S/P ABLATION OF ATRIAL FIBRILLATION: Status: ACTIVE | Noted: 2018-05-23

## 2018-05-23 PROBLEM — I48.19 PERSISTENT ATRIAL FIBRILLATION (HCC): Status: ACTIVE | Noted: 2018-05-23

## 2018-05-23 PROBLEM — Z98.890 S/P ABLATION OF ATRIAL FIBRILLATION: Status: ACTIVE | Noted: 2018-05-23

## 2018-05-23 PROBLEM — I48.91 A-FIB (HCC): Status: ACTIVE | Noted: 2018-05-23

## 2018-05-23 LAB
ABO + RH BLD: NORMAL
ACT BLD: 131 SECS (ref 79–138)
ACT BLD: 263 SECS (ref 79–138)
ACT BLD: 263 SECS (ref 79–138)
ACT BLD: 274 SECS (ref 79–138)
ACT BLD: 290 SECS (ref 79–138)
BLOOD GROUP ANTIBODIES SERPL: NORMAL
SPECIMEN EXP DATE BLD: NORMAL

## 2018-05-23 PROCEDURE — 74011250636 HC RX REV CODE- 250/636: Performed by: INTERNAL MEDICINE

## 2018-05-23 PROCEDURE — 77030018733 HC ELECTRD KT ENSITE STJU -G

## 2018-05-23 PROCEDURE — 86900 BLOOD TYPING SEROLOGIC ABO: CPT | Performed by: INTERNAL MEDICINE

## 2018-05-23 PROCEDURE — 77030026438 HC STYL ET INTUB CARD -A: Performed by: ANESTHESIOLOGY

## 2018-05-23 PROCEDURE — 74011250636 HC RX REV CODE- 250/636

## 2018-05-23 PROCEDURE — C1894 INTRO/SHEATH, NON-LASER: HCPCS

## 2018-05-23 PROCEDURE — 76060000039 HC ANESTHESIA 4 TO 4.5 HR

## 2018-05-23 PROCEDURE — 77030008684 HC TU ET CUF COVD -B: Performed by: ANESTHESIOLOGY

## 2018-05-23 PROCEDURE — C1893 INTRO/SHEATH, FIXED,NON-PEEL: HCPCS

## 2018-05-23 PROCEDURE — 93312 ECHO TRANSESOPHAGEAL: CPT

## 2018-05-23 PROCEDURE — 77030034850

## 2018-05-23 PROCEDURE — 77030011640 HC PAD GRND REM COVD -A

## 2018-05-23 PROCEDURE — 99218 HC RM OBSERVATION: CPT

## 2018-05-23 PROCEDURE — 74011000250 HC RX REV CODE- 250

## 2018-05-23 PROCEDURE — 77030039046 HC PAD DEFIB RADIOTRNSPNT CNMD -B

## 2018-05-23 PROCEDURE — 77030020506 HC NDL TRNSPTL NRG BAYL -F

## 2018-05-23 PROCEDURE — C1730 CATH, EP, 19 OR FEW ELECT: HCPCS

## 2018-05-23 PROCEDURE — C1759 CATH, INTRA ECHOCARDIOGRAPHY: HCPCS

## 2018-05-23 PROCEDURE — 77030013797 HC KT TRNSDUC PRSSR EDWD -A

## 2018-05-23 PROCEDURE — 77030016898 HC CBL EP EXT3 STJU -B

## 2018-05-23 PROCEDURE — C1731 CATH, EP, 20 OR MORE ELEC: HCPCS

## 2018-05-23 PROCEDURE — 77030033352 HC TBNG IRR PMP COOL PNT STJU -B

## 2018-05-23 PROCEDURE — 85347 COAGULATION TIME ACTIVATED: CPT

## 2018-05-23 PROCEDURE — 92960 CARDIOVERSION ELECTRIC EXT: CPT

## 2018-05-23 PROCEDURE — 77030016899 HC CBL EP EXT4 BSC -B

## 2018-05-23 PROCEDURE — 36415 COLL VENOUS BLD VENIPUNCTURE: CPT | Performed by: INTERNAL MEDICINE

## 2018-05-23 PROCEDURE — C1766 INTRO/SHEATH,STRBLE,NON-PEEL: HCPCS

## 2018-05-23 PROCEDURE — C2630 CATH, EP, COOL-TIP: HCPCS

## 2018-05-23 RX ORDER — SODIUM CHLORIDE, SODIUM LACTATE, POTASSIUM CHLORIDE, CALCIUM CHLORIDE 600; 310; 30; 20 MG/100ML; MG/100ML; MG/100ML; MG/100ML
INJECTION, SOLUTION INTRAVENOUS
Status: DISCONTINUED | OUTPATIENT
Start: 2018-05-23 | End: 2018-05-23 | Stop reason: HOSPADM

## 2018-05-23 RX ORDER — ACETAMINOPHEN 325 MG/1
650 TABLET ORAL
Status: DISCONTINUED | OUTPATIENT
Start: 2018-05-23 | End: 2018-05-24 | Stop reason: HOSPADM

## 2018-05-23 RX ORDER — ATROPINE SULFATE 0.1 MG/ML
0.5 INJECTION INTRAVENOUS AS NEEDED
Status: DISCONTINUED | OUTPATIENT
Start: 2018-05-23 | End: 2018-05-23

## 2018-05-23 RX ORDER — DEXAMETHASONE SODIUM PHOSPHATE 4 MG/ML
INJECTION, SOLUTION INTRA-ARTICULAR; INTRALESIONAL; INTRAMUSCULAR; INTRAVENOUS; SOFT TISSUE AS NEEDED
Status: DISCONTINUED | OUTPATIENT
Start: 2018-05-23 | End: 2018-05-23 | Stop reason: HOSPADM

## 2018-05-23 RX ORDER — ROCURONIUM BROMIDE 10 MG/ML
INJECTION, SOLUTION INTRAVENOUS AS NEEDED
Status: DISCONTINUED | OUTPATIENT
Start: 2018-05-23 | End: 2018-05-23 | Stop reason: HOSPADM

## 2018-05-23 RX ORDER — PROTAMINE SULFATE 10 MG/ML
INJECTION, SOLUTION INTRAVENOUS AS NEEDED
Status: DISCONTINUED | OUTPATIENT
Start: 2018-05-23 | End: 2018-05-23 | Stop reason: HOSPADM

## 2018-05-23 RX ORDER — ZOLPIDEM TARTRATE 5 MG/1
5 TABLET ORAL
Status: DISCONTINUED | OUTPATIENT
Start: 2018-05-23 | End: 2018-05-24 | Stop reason: HOSPADM

## 2018-05-23 RX ORDER — ONDANSETRON 2 MG/ML
INJECTION INTRAMUSCULAR; INTRAVENOUS AS NEEDED
Status: DISCONTINUED | OUTPATIENT
Start: 2018-05-23 | End: 2018-05-23 | Stop reason: HOSPADM

## 2018-05-23 RX ORDER — HEPARIN SODIUM 200 [USP'U]/100ML
1000 INJECTION, SOLUTION INTRAVENOUS CONTINUOUS
Status: DISCONTINUED | OUTPATIENT
Start: 2018-05-23 | End: 2018-05-23

## 2018-05-23 RX ORDER — SODIUM CHLORIDE 0.9 % (FLUSH) 0.9 %
5 SYRINGE (ML) INJECTION AS NEEDED
Status: DISCONTINUED | OUTPATIENT
Start: 2018-05-23 | End: 2018-05-23

## 2018-05-23 RX ORDER — SUCCINYLCHOLINE CHLORIDE 20 MG/ML
INJECTION INTRAMUSCULAR; INTRAVENOUS AS NEEDED
Status: DISCONTINUED | OUTPATIENT
Start: 2018-05-23 | End: 2018-05-23 | Stop reason: HOSPADM

## 2018-05-23 RX ORDER — SODIUM CHLORIDE 0.9 % (FLUSH) 0.9 %
5-10 SYRINGE (ML) INJECTION EVERY 8 HOURS
Status: DISCONTINUED | OUTPATIENT
Start: 2018-05-23 | End: 2018-05-24 | Stop reason: HOSPADM

## 2018-05-23 RX ORDER — LIDOCAINE HYDROCHLORIDE 20 MG/ML
INJECTION, SOLUTION EPIDURAL; INFILTRATION; INTRACAUDAL; PERINEURAL AS NEEDED
Status: DISCONTINUED | OUTPATIENT
Start: 2018-05-23 | End: 2018-05-23 | Stop reason: HOSPADM

## 2018-05-23 RX ORDER — EPHEDRINE SULFATE 50 MG/ML
INJECTION, SOLUTION INTRAVENOUS AS NEEDED
Status: DISCONTINUED | OUTPATIENT
Start: 2018-05-23 | End: 2018-05-23 | Stop reason: HOSPADM

## 2018-05-23 RX ORDER — SODIUM CHLORIDE 0.9 % (FLUSH) 0.9 %
5-10 SYRINGE (ML) INJECTION AS NEEDED
Status: DISCONTINUED | OUTPATIENT
Start: 2018-05-23 | End: 2018-05-24 | Stop reason: HOSPADM

## 2018-05-23 RX ORDER — SODIUM CHLORIDE 0.9 % (FLUSH) 0.9 %
5-10 SYRINGE (ML) INJECTION AS NEEDED
Status: DISCONTINUED | OUTPATIENT
Start: 2018-05-23 | End: 2018-05-23

## 2018-05-23 RX ORDER — HYDROCODONE BITARTRATE AND ACETAMINOPHEN 5; 325 MG/1; MG/1
1 TABLET ORAL
Status: DISCONTINUED | OUTPATIENT
Start: 2018-05-23 | End: 2018-05-24 | Stop reason: HOSPADM

## 2018-05-23 RX ORDER — PHENYLEPHRINE HCL IN 0.9% NACL 0.4MG/10ML
SYRINGE (ML) INTRAVENOUS AS NEEDED
Status: DISCONTINUED | OUTPATIENT
Start: 2018-05-23 | End: 2018-05-23 | Stop reason: HOSPADM

## 2018-05-23 RX ORDER — HEPARIN SODIUM 10000 [USP'U]/100ML
18-36 INJECTION, SOLUTION INTRAVENOUS
Status: DISCONTINUED | OUTPATIENT
Start: 2018-05-23 | End: 2018-05-23

## 2018-05-23 RX ORDER — HEPARIN SODIUM 1000 [USP'U]/ML
10000 INJECTION, SOLUTION INTRAVENOUS; SUBCUTANEOUS AS NEEDED
Status: DISCONTINUED | OUTPATIENT
Start: 2018-05-23 | End: 2018-05-23

## 2018-05-23 RX ORDER — PROPOFOL 10 MG/ML
INJECTION, EMULSION INTRAVENOUS AS NEEDED
Status: DISCONTINUED | OUTPATIENT
Start: 2018-05-23 | End: 2018-05-23 | Stop reason: HOSPADM

## 2018-05-23 RX ORDER — FENTANYL CITRATE 50 UG/ML
INJECTION, SOLUTION INTRAMUSCULAR; INTRAVENOUS AS NEEDED
Status: DISCONTINUED | OUTPATIENT
Start: 2018-05-23 | End: 2018-05-23 | Stop reason: HOSPADM

## 2018-05-23 RX ADMIN — EPHEDRINE SULFATE 10 MG: 50 INJECTION, SOLUTION INTRAVENOUS at 13:24

## 2018-05-23 RX ADMIN — PROPOFOL 120 MG: 10 INJECTION, EMULSION INTRAVENOUS at 13:15

## 2018-05-23 RX ADMIN — Medication 10 ML: at 21:14

## 2018-05-23 RX ADMIN — HEPARIN SODIUM 18 UNITS/KG/HR: 10000 INJECTION, SOLUTION INTRAVENOUS at 14:45

## 2018-05-23 RX ADMIN — HEPARIN SODIUM 3000 UNITS: 200 INJECTION, SOLUTION INTRAVENOUS at 15:08

## 2018-05-23 RX ADMIN — Medication 80 MCG: at 13:20

## 2018-05-23 RX ADMIN — SUCCINYLCHOLINE CHLORIDE 140 MG: 20 INJECTION INTRAMUSCULAR; INTRAVENOUS at 13:15

## 2018-05-23 RX ADMIN — ROCURONIUM BROMIDE 5 MG: 10 INJECTION, SOLUTION INTRAVENOUS at 13:15

## 2018-05-23 RX ADMIN — HEPARIN SODIUM 5000 UNITS: 1000 INJECTION, SOLUTION INTRAVENOUS; SUBCUTANEOUS at 14:24

## 2018-05-23 RX ADMIN — HEPARIN SODIUM 10000 UNITS: 1000 INJECTION, SOLUTION INTRAVENOUS; SUBCUTANEOUS at 14:16

## 2018-05-23 RX ADMIN — FENTANYL CITRATE 50 MCG: 50 INJECTION, SOLUTION INTRAMUSCULAR; INTRAVENOUS at 13:15

## 2018-05-23 RX ADMIN — LIDOCAINE HYDROCHLORIDE 40 MG: 20 INJECTION, SOLUTION EPIDURAL; INFILTRATION; INTRACAUDAL; PERINEURAL at 13:15

## 2018-05-23 RX ADMIN — SODIUM CHLORIDE, SODIUM LACTATE, POTASSIUM CHLORIDE, CALCIUM CHLORIDE: 600; 310; 30; 20 INJECTION, SOLUTION INTRAVENOUS at 12:46

## 2018-05-23 RX ADMIN — ROCURONIUM BROMIDE 10 MG: 10 INJECTION, SOLUTION INTRAVENOUS at 15:11

## 2018-05-23 RX ADMIN — FENTANYL CITRATE 50 MCG: 50 INJECTION, SOLUTION INTRAMUSCULAR; INTRAVENOUS at 13:30

## 2018-05-23 RX ADMIN — ROCURONIUM BROMIDE 40 MG: 10 INJECTION, SOLUTION INTRAVENOUS at 13:27

## 2018-05-23 RX ADMIN — PROPOFOL 50 MG: 10 INJECTION, EMULSION INTRAVENOUS at 13:00

## 2018-05-23 RX ADMIN — Medication 10 ML: at 21:13

## 2018-05-23 RX ADMIN — LIDOCAINE HYDROCHLORIDE 40 MG: 20 INJECTION, SOLUTION EPIDURAL; INFILTRATION; INTRACAUDAL; PERINEURAL at 13:00

## 2018-05-23 RX ADMIN — ROCURONIUM BROMIDE 10 MG: 10 INJECTION, SOLUTION INTRAVENOUS at 14:14

## 2018-05-23 RX ADMIN — PROTAMINE SULFATE 50 MG: 10 INJECTION, SOLUTION INTRAVENOUS at 16:30

## 2018-05-23 RX ADMIN — DEXAMETHASONE SODIUM PHOSPHATE 4 MG: 4 INJECTION, SOLUTION INTRA-ARTICULAR; INTRALESIONAL; INTRAMUSCULAR; INTRAVENOUS; SOFT TISSUE at 13:56

## 2018-05-23 RX ADMIN — SODIUM CHLORIDE, SODIUM LACTATE, POTASSIUM CHLORIDE, CALCIUM CHLORIDE: 600; 310; 30; 20 INJECTION, SOLUTION INTRAVENOUS at 13:30

## 2018-05-23 RX ADMIN — Medication 80 MCG: at 13:24

## 2018-05-23 RX ADMIN — PROPOFOL 50 MG: 10 INJECTION, EMULSION INTRAVENOUS at 13:02

## 2018-05-23 RX ADMIN — Medication 80 MCG: at 15:05

## 2018-05-23 RX ADMIN — ROCURONIUM BROMIDE 20 MG: 10 INJECTION, SOLUTION INTRAVENOUS at 14:37

## 2018-05-23 RX ADMIN — ONDANSETRON 4 MG: 2 INJECTION INTRAMUSCULAR; INTRAVENOUS at 16:45

## 2018-05-23 NOTE — PROCEDURES
Cardiac Procedure Note   Patient: Ana Luisa Holder  MRN: [id number]  SSN: [id number]   YOB: 1940 Age: [de-identified] y.o. Sex: male    Date of Procedure: 5/23/2018   Pre-procedure Diagnosis: symptomatic persistent atrial fibrillation.  Medication intolerance  Post-procedure Diagnosis: same  Procedure: MAT  :  Dr. Yue Boogie MD    Assistant(s):  None  Anesthesia: Moderate Sedation with propofol by anesthesia  Estimated Blood Loss: none  Specimens Removed: None  Findings: small atrial appendage  No thrombus  Moderate MR  Trace TR  LVEF 92%  Complications: None   Implants:  None  Signed by:  Yue Boogie MD  5/23/2018  1:26 PM

## 2018-05-23 NOTE — H&P
Cardiac Electrophysiology H/P Note      Subjective:      Dalton Faye is a [de-identified] y.o. patient who is seen for ablation of atrial fibrillation     He was kindly referred by Dr. Marcelino Sullivan.       Patient is on rate control medication. He feels tired when he played golf for exercise. He does not like the medication because it causes blood pressure sometimes dropping down to low. He denies syncope. The patient taking anticoagulation Eliquis and complained about easy bruising but no GI or  bleeding  Echo 2017 with moderate MR TR and ATILIO     PMhx hypertension. No DM, CHF, MI. His wife has atrial fibrillation and she takes coumadin.     Social hx: no tobacco. 1-2 beers daily. He plays golf. [employer]. . Family hx: No family hx of CAD. Parents  of old age        Patient Active Problem List   Diagnosis Code    Acne rosacea L71.9    Hypercholesteremia E78.00    HTN (hypertension) I10    Thrombocytopenia ([address] D69.6    Advance care planning Z71.89    Advanced care planning/counseling discussion Z71.89       Allergies   Allergen Reactions    Hydrochlorothiazide Rash    Pcn [Penicillins] Other (comments)     MOUTH SORE     Past Medical History:   Diagnosis Date    Acne rosacea 2010    HTN (hypertension) 2010    Hypercholesteremia 2010     No past surgical history   Family History   Problem Relation Age of Onset    Lung Disease Father      Social History   Substance Use Topics    Smoking status: Former Smoker    Smokeless tobacco: Never Used      Comment: 1968    Alcohol use Yes        Review of Systems:   Constitutional: Negative for fever, chills, weight loss, + malaise/fatigue. HEENT: Negative for nosebleeds, vision changes. Respiratory: Negative for cough, hemoptysis  Cardiovascular: Negative for chest pain, + palpitations, orthopnea, claudication, leg swelling, syncope, and PND.    Gastrointestinal: Negative for nausea, vomiting, diarrhea, blood in stool and melena. Genitourinary: Negative for dysuria, and hematuria. Musculoskeletal: Negative for myalgias, arthralgia. Skin: Negative for rash. Heme: bruise easily. Neurological: Negative for speech change and focal weakness     Objective:     Visit Vitals    /75 (BP 1 Location: Left arm, BP Patient Position: At rest)    Pulse 75    Temp 97.3 °F (36.3 °C)    Resp 18    Ht 5' 10.5\" (1.791 m)    Wt 181 lb (82.1 kg)    SpO2 98%    BMI 25.6 kg/m2          Physical Exam:   Constitutional: well-developed and well-nourished. No respiratory distress. Head: Normocephalic and atraumatic. Eyes: Pupils are equal, round  ENT: hearing normal  Neck: supple. No JVD present. Cardiovascular: Normal rate, irregular rhythm. Exam reveals no gallop and no friction rub. No murmur heard. Pulmonary/Chest: Effort normal and breath sounds normal. No wheezes. Abdominal: Soft, no tenderness. Musculoskeletal: no edema. Neurological: alert,oriented. Skin: Skin is warm and dry  Psychiatric: normal mood and affect. Behavior is normal. Judgment and thought content normal.         Assessment/Plan:      Persistent atrial fibrillation with fatigue and does not want antiarrhythmic  Medication intolerance  He wants to proceed with EP study and ablation of atrial fibrillation  Need MAT   He has been off NOAC for 3 days  Risks include but are not limited to bleeding in the groin, infection in the groin and/or heart valves, fistula between the groin arteries and veins, valvular damage, diaphragmatic paralysis, aortic dissection, heart attack, stroke, blood clot in the leg, pulmonary embolism, lung collapse (pneumothorax or hemothorax), heart collapse (pericardial tamponade), death. The added risks for left atrial ablation may be atrial-esophageal fistula, pulmonary vein stenoses, kidney failure (from contrast injection), higher risk of bleeding, stroke and heart attack.   Elective or emergency surgery may be required to repair some of these complications. Prolonged hospitalization would be required. General anesthesia and transeptal catheterization may be required for the procedure  Thank you for involving me in this patient's care and please call with further concerns or questions. Alessandra Anderson M.D.   Electrophysiology/Cardiology  [facility] [place]  [facility] 84, [facility] [address] [place] [address]  [phone]

## 2018-05-23 NOTE — PROGRESS NOTES
1901 - received to [facility] from Cath lab, tele in place and verified with monitor tech, trevino in place until bedrest is over at 9:00pm, bilateral groin sites show no signs of bleeding or hematoma.

## 2018-05-23 NOTE — PROCEDURES
Cardiac Procedure Note   Patient: Catina Faulkner  MRN: [id number]  SSN: [id number]   YOB: 1940 Age: [de-identified] y.o.   Sex: male    Date of Procedure: 5/23/2018   Pre-procedure Diagnosis: symptomatic persistent atrial fibrillation, fatigue, medication intolerance  Post-procedure Diagnosis: same  Procedure: EP Study and Ablation  :  Dr. Nicky Patel MD    Assistant(s):  None  Anesthesia: general anesthesia  Estimated Blood Loss: Less than 10 mL   Specimens Removed: None  Findings: thick atrial septum  2 transeptal punctures  PVI all 4 veins and still in AFIB  200 J CV to NSR  Complications: None   Implants:  None  Signed by:  Nicky Patel MD  5/23/2018  5:27 PM

## 2018-05-23 NOTE — ANESTHESIA PREPROCEDURE EVALUATION
Anesthetic History   No history of anesthetic complications            Review of Systems / Medical History  Patient summary reviewed, nursing notes reviewed and pertinent labs reviewed    Pulmonary  Within defined limits                 Neuro/Psych   Within defined limits           Cardiovascular  Within defined limits  Hypertension        Dysrhythmias : atrial fibrillation           GI/Hepatic/Renal  Within defined limits              Endo/Other  Within defined limits           Other Findings              Physical Exam    Airway  Mallampati: II  TM Distance: > 6 cm  Neck ROM: normal range of motion   Mouth opening: Normal     Cardiovascular  Regular rate and rhythm,  S1 and S2 normal,  no murmur, click, rub, or gallop             Dental  No notable dental hx       Pulmonary  Breath sounds clear to auscultation               Abdominal  GI exam deferred       Other Findings            Anesthetic Plan    ASA: 2  Anesthesia type: general    Monitoring Plan: Arterial line      Induction: Intravenous  Anesthetic plan and risks discussed with: Patient

## 2018-05-23 NOTE — IP AVS SNAPSHOT
[address] 
[phone] Patient: Katt Zuniga MRN: [id number] QST:[birth year] A check merary indicates which time of day the medication should be taken. My Medications START taking these medications Instructions Each Dose to Equal  
 Morning Noon Evening Bedtime  
 flecainide 50 mg tablet Commonly known as:  TAMBOCOR Your last dose was: Your next dose is: Take 1 Tab by mouth two (2) times a day. 50 mg CONTINUE taking these medications Instructions Each Dose to Equal  
 Morning Noon Evening Bedtime  
 apixaban 5 mg tablet Commonly known as:  Lenord Car Your last dose was: Your next dose is: Take 1 Tab by mouth two (2) times a day. 5 mg  
    
   
   
   
  
 dilTIAZem  mg ER capsule Commonly known as:  CARDIZEM CD Your last dose was: Your next dose is: Take 1 Cap by mouth daily. 180 mg  
    
   
   
   
  
 furosemide 20 mg tablet Commonly known as:  LASIX Your last dose was: Your next dose is: Take 1 Tab by mouth daily. 20 mg  
    
   
   
   
  
 lisinopril 20 mg tablet Commonly known as:  Alfredo Handing Your last dose was: Your next dose is: Take 1 Tab by mouth daily. 20 mg Where to Get Your Medications These medications were sent to [address] [place] [address] AT [address]  [address] [place] [id number] Phone:  [phone]  
  flecainide 50 mg tablet

## 2018-05-23 NOTE — PROGRESS NOTES
TRANSFER - IN REPORT:    Verbal report received from [facility] on Giana Montoya  being received from procedure area for routine progression of care. Report consisted of patients Situation, Background, Assessment and Recommendations(SBAR). Information from the following report(s) SBAR, Procedure Summary, MAR and Cardiac Rhythm NSR was reviewed with the receiving clinician. Opportunity for questions and clarification was provided. Assessment completed upon patients arrival to [address] [place] [address] and care assumed. Cardiac Cath Lab Recovery Arrival Note:    Giana Montoya arrived to [facility] recovery area. Patient procedure= MAT/Afib Ablation. Patient on cardiac monitor, non-invasive blood pressure, SPO2 monitor. On O2 @ 2 lpm via NC.  IV  of LR on pump at 25 ml/hr. Patient status doing well without problems. Patient is A&Ox 4. Patient sedated arousable to name. Kirill Parker PROCEDURE SITE CHECK:    Procedure site:without any bleeding and No Hematoma, No pain/discomfort reported at procedure site. No change in patient status. Continue to monitor patient and status.

## 2018-05-23 NOTE — PROGRESS NOTES
Transfer to [address] [place] [address] from Procedure Area    Verbal report given to [facility] on [place] being transferred to Cardiac Cath Lab  for routine post - op   Patient is post A Fib ablation procedure. Patient stable upon transfer to . Report consisted of patients Situation, Background, Assessment and   Recommendations(SBAR). Information from the following report(s) Procedure Summary, Intake/Output, MAR and Cardiac Rhythm Sinus Bradycardia was reviewed with the receiving nurse. Opportunity for questions and clarification was provided. Patient medicated during procedure with orders obtained and verified by Dr. Demetrio Pham. Refer to patient PROCEDURE REPORT for vital signs, assessment, status, and response during procedure.

## 2018-05-23 NOTE — DISCHARGE INSTRUCTIONS
Patient Instructions Post-EP study and ablation    1. No heavy lifting or exercises for 1 week. This includes the following:  Do not push or move furniture, jog or run    2. Do not drive for 2 days. 3.  Call Dr. Cookie Smith at [phone] if you experience any of the following symptoms:  Dizziness, lightheadedness, fainting spells  Lack of energy  Shortness of breath  Rapid heart rate  Chest or muscle twitches  Blurry vision, double vision, weakness, numbness  Nausea, vomiting  Fever  Bleeding in the stool, black stool  Leg swelling, pain    4.   Follow-up with Dr. Roz Doshi  Date Time Provider Sancho Harmon   6/7/2018 8:00 AM Wolfgang Jean Baptiste  [address]

## 2018-05-23 NOTE — PROCEDURES
ELECTROPHYSIOLOGY (EP) REPORT    DATE OF PROCEDURE: 5/23 /2018    PROCEDURES  1. Comprehensive Electrophysiology study including CS/LA/LV pacing   2. Ablations of persistent atrial fibrillation by pulmonary vein isolations. 3. Intracardiac echocardiogram was utilized and two transseptal catheterizations were performed. 4. Fluoroscopy was also used along with 3-dimensional electroanatomical DILIA Precision mapping. 5.  Electrical cardioversion was performed    BRIEF HISTORY: This is an [de-identified] y.o. man who had persistent atrial fibrillation and he is on Cardizem; he has been fatigued. The patient does not have adequate symptom control with medical therapy and therefore he is here for the atrial fibrillation ablation. The patient does not want to try antiarrhythmic medication and cardioversion alone. He wants to proceed with EP study and catheter ablation; the risks and benefits the procedure have been reviewed with him on several occasions  and he agreed to proceed. The patient has a CHADS2 vasc score of 3 (hypertension, age) and he has been off Eliquis for 3 so days MAT was warranted. MAT did not show thrombus    PROCEDURE IN DETAIL. After the informed written consent had been obtained, the patient was brought to the electrophysiology suite, where he was prepped and draped in usual sterile fashion. He had undergone general anesthesia. Access was obtained into the left and right femoral veins on a total of 4 occasions. Over the guidewires, a 10 and 6-Slovenian introducers were inserted into the left femoral vein, and one 5 F and one 8 F sheaths were inserted into the right femoral vein. The decapolar Bard diagnostic catheter was then advanced under fluoroscopy and placed in the coronary sinus for pacing and recording. The ablation catheter was positioned at the HIS bundle for recording and at RV/LV apex and HRA for pacing and recording.   Ep study was later done after he had established sinus rhythm and there was no dual AV node physiology, accessory pathway, atrial tachycardia or atrial flutter  The intracardiac echocardiogram used was advanced under fluoroscopy and 3 D map into the mid-right atrium. The echocardiogram catheter is [facility]. The cardiac structure was examined, and the patient does not have left atrial appendage thrombus. The interatrial septum was brought in view, and at this time, she was given intravenous heparin bolus and the [facility] RF Transseptal Catheterization System was then advanced over one of the long guidewire and then Agilis sheath into the SVC. The system was then pulled back under fluoroscopy and also with the intracardiac echocardiogram. Once the tenting of fossa ovalis was observed, the needle was then advanced into the left atrium. The saline injection was delivered via the needle and showed entry into the left atrium, and the needle was pulled back while the dilator and the long sheath was then advanced inside the left atrium. Interatrial septum was thickened therefore the sheath did not go into the left atrium. This was removed and flushed. The patient had been given heparin bolus and drip. Next, the [facility] deflectable transseptal sheath was used with a long transseptal needle. This was able to enter into the left atrium. Then second transseptal puncture was done with the SL 1 sheath and the dilator was removed along with the needle at the end. The sheath was then flushed with the pressurized heparinized saline fluid. Then, the 3-dimensional electroanatomical map of the left atrium was then obtained using the Spiral multielectrode circumferential St. Eliot catheter. The pulmonary veins were obtained and matched up with the cardiac CT images that had been obtained in the past. The patient has 4 discrete pulmonary veins: Two on the right and two on the left. The mapping ablation catheter used is a D/F curved St Eliot Flex Ability saline irrigated tip ablation catheter, 3.5-mm tip.  The pulmonary vein was accessed using the Spiral multielectrode catheter and there were pulmonary vein potentials on the right PVs. The wide circumferential ablations around the pulmonary veins at 35W. The esophagus was closer to the left pulmonary veins so ablations on this side posterior left atrium were done at 25-30 W. The local voltage was abated, and the catheter was placed individually each vein to document the isolation (entry and then exit blocks). It should also be mentioned that during the entire procedure, his heparin was given by extra bolus and also drip rate. ACT was checked every 20 minutes to keep the ACT close to 250-400 seconds. This by isolation of all 4 pulmonary veins, the patient still have the substrate and persistent atrial fibrillation. Therefore 200 J synchronized cardioversion was delivered and converted to normal sinus rhythm  Protamine was given to reverse heparin and sheaths were pulled    COMPLICATIONS: None. Specimen removed: NONE    Estimated blood loss is 10 ML    Protamine 50 mg total was used to reverse the heparin effect after completion of the procedure. The sheaths were removed, and manual compression was applied to both inguinal areas and achieved good hemostasis. The patient was then wakened up from the general anesthesia without complication. She moved all the extremities equally and there was no neurological deficit. The patient opened his eyes and followed commands. Intracardiac echo did not show pericardial effusion    Hemodynamically, the patient was stable and in sinus rhythm. The baseline measurement   QRS duration 119 ms  HV is 50 ms   AH jump none  Retrograde Wenckebach is 490 ms   AV velia wenckebach 430 ms  AV velia  ms at 600 ms    ASSESSMENT AND PLAN: The patient will be monitored in the telemetry unit overnight. His clinical status will be assessed. He will be continued with Cardizem and Eliquis.  Further monitoring of arrhythmia return will be continued in the office.

## 2018-05-23 NOTE — PROGRESS NOTES
Cardiac Cath Lab Recovery Arrival Note:      Robin Cox arrived to Cardiac Cath Lab, Recovery Area. Staff introduced to patient. Patient identifiers verified with NAME and DATE OF BIRTH. Procedure verified with patient. Consent forms reviewed and signed by patient or authorized representative and verified. Allergies verified. Patient and family oriented to department. Patient and family informed of procedure and plan of care. Questions answered with review. Patient prepped for procedure, per orders from physician, prior to arrival.    Patient on cardiac monitor, non-invasive blood pressure, SPO2 monitor. On Room Air. Patient is A&Ox 4. Patient reports No Pain. Patient in stretcher, in low position, with side rails up, call bell within reach, patient instructed to call if assistance as needed. Patient prep in: [address], [place] [address]. Family in: [facility] Home.    Prep by: Yuliya Rodriguez RN

## 2018-05-23 NOTE — PROGRESS NOTES
Mariana Hooker TRANSFER - OUT REPORT:    Verbal report given to Wellington Harrington RN on Kam[facility] Staff being transferred to Room 453 for routine progression of care       Report consisted of patients Situation, Background, Assessment and   Recommendations(SBAR). Information from the following report(s) SBAR, MAR, Recent Results and Cardiac Rhythm NSR was reviewed with the receiving nurse. Opportunity for questions and clarification was provided.

## 2018-05-23 NOTE — PROGRESS NOTES
Cardiac Cath Lab Procedure Area Arrival Note:    Dalton Faye arrived to Cardiac Cath Lab, Procedure Area. Patient identifiers verified with NAME and DATE OF BIRTH. Procedure verified with patient. Consent forms verified. Allergies verified. Patient informed of procedure and plan of care. Questions answered with review. Patient voiced understanding of procedure and plan of care. Patient on cardiac monitor, non-invasive blood pressure, SPO2 monitor. On O2 per CRNA . IV of NS on pump at 25 ml/hr. Patient status doing well without problems. Patient is A&Ox 4. Patient reports no pain. Patient medicated during procedure with orders obtained and verified by Dr. Rebeca Jaime. Refer to patients Cardiac Cath Lab PROCEDURE REPORT for vital signs, assessment, status, and response during procedure, printed at end of case. Printed report on chart or scanned into chart.

## 2018-05-24 VITALS
BODY MASS INDEX: 25.56 KG/M2 | SYSTOLIC BLOOD PRESSURE: 139 MMHG | OXYGEN SATURATION: 94 % | TEMPERATURE: 98.5 F | RESPIRATION RATE: 18 BRPM | DIASTOLIC BLOOD PRESSURE: 79 MMHG | HEIGHT: 71 IN | WEIGHT: 182.54 LBS | HEART RATE: 85 BPM

## 2018-05-24 LAB
ANION GAP SERPL CALC-SCNC: 9 MMOL/L (ref 5–15)
BUN SERPL-MCNC: 13 MG/DL (ref 6–20)
BUN/CREAT SERPL: 9 (ref 12–20)
CALCIUM SERPL-MCNC: 8.8 MG/DL (ref 8.5–10.1)
CHLORIDE SERPL-SCNC: 106 MMOL/L (ref 97–108)
CO2 SERPL-SCNC: 24 MMOL/L (ref 21–32)
CREAT SERPL-MCNC: 1.39 MG/DL (ref 0.7–1.3)
GLUCOSE SERPL-MCNC: 137 MG/DL (ref 65–100)
POTASSIUM SERPL-SCNC: 4.3 MMOL/L (ref 3.5–5.1)
SODIUM SERPL-SCNC: 139 MMOL/L (ref 136–145)

## 2018-05-24 PROCEDURE — 36415 COLL VENOUS BLD VENIPUNCTURE: CPT | Performed by: INTERNAL MEDICINE

## 2018-05-24 PROCEDURE — 80048 BASIC METABOLIC PNL TOTAL CA: CPT | Performed by: INTERNAL MEDICINE

## 2018-05-24 PROCEDURE — 74011250637 HC RX REV CODE- 250/637: Performed by: INTERNAL MEDICINE

## 2018-05-24 PROCEDURE — 99218 HC RM OBSERVATION: CPT

## 2018-05-24 RX ORDER — LISINOPRIL 20 MG/1
TABLET ORAL
Qty: 90 TAB | Refills: 0 | Status: SHIPPED | OUTPATIENT
Start: 2018-05-24 | End: 2018-09-02 | Stop reason: SDUPTHER

## 2018-05-24 RX ORDER — FLECAINIDE ACETATE 50 MG/1
50 TABLET ORAL 2 TIMES DAILY
Qty: 60 TAB | Refills: 1 | Status: SHIPPED | OUTPATIENT
Start: 2018-05-24 | End: 2018-07-29 | Stop reason: SDUPTHER

## 2018-05-24 RX ORDER — FUROSEMIDE 20 MG/1
TABLET ORAL
Qty: 30 TAB | Refills: 5 | Status: SHIPPED | OUTPATIENT
Start: 2018-05-24 | End: 2018-06-07 | Stop reason: SDUPTHER

## 2018-05-24 RX ADMIN — APIXABAN 5 MG: 5 TABLET, FILM COATED ORAL at 08:21

## 2018-05-24 RX ADMIN — Medication 10 ML: at 06:36

## 2018-05-24 NOTE — PROGRESS NOTES
Problem: Falls - Risk of  Goal: *Absence of Falls  Document Ino Fall Risk and appropriate interventions in the flowsheet.    Outcome: Progressing Towards Goal  Fall Risk Interventions:            Medication Interventions: Evaluate medications/consider consulting pharmacy, Teach patient to arise slowly

## 2018-05-24 NOTE — ANESTHESIA POSTPROCEDURE EVALUATION
Post-Anesthesia Evaluation and Assessment    Patient: Tara Torrez MRN: [id number]  SSN: [id number]    YOB: 1940  Age: [de-identified] y.o. Sex: male       Cardiovascular Function/Vital Signs  Visit Vitals    /78 (BP 1 Location: Left arm, BP Patient Position: At rest)    Pulse 75    Temp 36.7 °C (98 °F)    Resp 16    Ht 5' 10.5\" (1.791 m)    Wt 82.8 kg (182 lb 8.7 oz)    SpO2 93%    BMI 25.82 kg/m2       Patient is status post general anesthesia for * No procedures listed *. Nausea/Vomiting: None    Postoperative hydration reviewed and adequate. Pain:  Pain Scale 1: Numeric (0 - 10) (05/24/18 0348)  Pain Intensity 1: 0 (05/24/18 0348)   Managed    Neurological Status: At baseline    Mental Status and Level of Consciousness: Arousable    Pulmonary Status:   O2 Device: Room air (05/24/18 0348)   Adequate oxygenation and airway patent    Complications related to anesthesia: None    Post-anesthesia assessment completed.  No concerns    Signed By: Fawad George MD     May 24, 2018

## 2018-05-24 NOTE — PROGRESS NOTES
1930: Bedside shift change report given to [address] [place] [address] Yodit (oncoming nurse) by Marisol Brown (offgoing nurse). Report included the following information SBAR, Intake/Output, MAR, Recent Results, Med Rec Status and Cardiac Rhythm NSR.     2100: North d/c'd. Pt given urinal to julius by 0300.    2340: Pt voided 200mLs clear yellow urine. 0730: Bedside shift change report given to Annelise (oncoming nurse) by [address] [place] [address] Yodit (offgoing nurse). Report included the following information SBAR, Intake/Output, MAR, Recent Results, Med Rec Status and Cardiac Rhythm NSR.

## 2018-05-24 NOTE — PROGRESS NOTES
0945: I have reviewed discharge instructions with the patient. The patient verbalized understanding. DISCHARGE SUMMARY from Nurse    PATIENT INSTRUCTIONS:    After general anesthesia or intravenous sedation, for 24 hours or while taking prescription Narcotics:  · Limit your activities  · Do not drive and operate hazardous machinery  · Do not make important personal or business decisions  · Do  not drink alcoholic beverages  · If you have not urinated within 8 hours after discharge, please contact your surgeon on call. Report the following to your surgeon:  · Excessive pain, swelling, redness or odor of or around the surgical area  · Temperature over 100.5  · Nausea and vomiting lasting longer than 4 hours or if unable to take medications  · Any signs of decreased circulation or nerve impairment to extremity: change in color, persistent  numbness, tingling, coldness or increase pain  · Any questions    What to do at Home:  Recommended activity: Activity as tolerated, see discharge instructions    If you experience any of the following symptoms see discharge instructions, please follow up with MD.    *  Please give a list of your current medications to your Primary Care Provider. *  Please update this list whenever your medications are discontinued, doses are      changed, or new medications (including over-the-counter products) are added. *  Please carry medication information at all times in case of emergency situations. These are general instructions for a healthy lifestyle:    No smoking/ No tobacco products/ Avoid exposure to second hand smoke  Surgeon General's Warning:  Quitting smoking now greatly reduces serious risk to your health.     Obesity, smoking, and sedentary lifestyle greatly increases your risk for illness    A healthy diet, regular physical exercise & weight monitoring are important for maintaining a healthy lifestyle    You may be retaining fluid if you have a history of heart failure or if you experience any of the following symptoms:  Weight gain of 3 pounds or more overnight or 5 pounds in a week, increased swelling in our hands or feet or shortness of breath while lying flat in bed. Please call your doctor as soon as you notice any of these symptoms; do not wait until your next office visit. Recognize signs and symptoms of STROKE:    F-face looks uneven    A-arms unable to move or move unevenly    S-speech slurred or non-existent    T-time-call 911 as soon as signs and symptoms begin-DO NOT go       Back to bed or wait to see if you get better-TIME IS BRAIN. Warning Signs of HEART ATTACK     Call 911 if you have these symptoms:   Chest discomfort. Most heart attacks involve discomfort in the center of the chest that lasts more than a few minutes, or that goes away and comes back. It can feel like uncomfortable pressure, squeezing, fullness, or pain.  Discomfort in other areas of the upper body. Symptoms can include pain or discomfort in one or both arms, the back, neck, jaw, or stomach.  Shortness of breath with or without chest discomfort.  Other signs may include breaking out in a cold sweat, nausea, or lightheadedness. Don't wait more than five minutes to call [address]! Fast action can save your life. Calling 911 is almost always the fastest way to get lifesaving treatment. Emergency Medical Services staff can begin treatment when they arrive  up to an hour sooner than if someone gets to the hospital by car. The discharge information has been reviewed with the patient. The patient verbalized understanding. Discharge medications reviewed with the patient and appropriate educational materials and side effects teaching were provided.   ___________________________________________________________________________________________________________________________________

## 2018-05-24 NOTE — DISCHARGE SUMMARY
Cardiology Discharge Summary               Patient ID:  Bert Cee  [id number]  3 y.o.  1940    Admit Date: 5/23/2018    Discharge Date: 5/24/2018     Admitting Physician: Pollo Sullivan MD     Discharge Physician: Pollo Sullivan MD    Admission Diagnoses: MAT\A FIB\AB;AT ANES;A-fib [facility]); A-fib ([address] Persistent atr*    Discharge Diagnoses: Principal Problem:    S/P ablation of atrial fibrillation (5/23/2018)      Overview: 5/23/2018    Active Problems:      Persistent atrial fibrillation ([address] (5/23/2018)        Discharge Condition: Stable    Cardiology Procedures this Admission:  MAT EP study and ablation of atrial fibrillation, cardioversion    Hospital Course: no complication over night  Stayed in NSR  No bleeding  Add flecainide short term during blanking period    Consults: None    Discharge Exam:     Visit Vitals    /79 (BP 1 Location: Left arm, BP Patient Position: At rest)    Pulse 85    Temp 98.5 °F (36.9 °C)    Resp 18    Ht 5' 10.5\" (1.791 m)    Wt 182 lb 8.7 oz (82.8 kg)    SpO2 94%    BMI 25.82 kg/m2     General Appearance:  Well developed, well nourished, alert and oriented x 3, and individual in no acute distress. Ears/Nose/Mouth/Throat:   Hearing grossly normal.         Neck: Supple. Chest:   Lungs clear to auscultation bilaterally. Cardiovascular:  Regular rhythm, no murmur. Abdomen:   Soft, non-tender    Extremities: No edema bilaterally. Skin: Warm and dry. Disposition: home    Patient Instructions:   Current Discharge Medication List      START taking these medications    Details   flecainide (TAMBOCOR) 50 mg tablet Take 1 Tab by mouth two (2) times a day. Qty: 60 Tab, Refills: 1         CONTINUE these medications which have NOT CHANGED    Details   apixaban (ELIQUIS) 5 mg tablet Take 1 Tab by mouth two (2) times a day. Qty: 180 Tab, Refills: 1      dilTIAZem CD (CARDIZEM CD) 180 mg ER capsule Take 1 Cap by mouth daily.   Qty: 30 Cap, Refills: 6 furosemide (LASIX) 20 mg tablet Take 1 Tab by mouth daily. Qty: 30 Tab, Refills: 5      lisinopril (PRINIVIL, ZESTRIL) 20 mg tablet Take 1 Tab by mouth daily. Qty: 90 Tab, Refills: 3              Referenced discharge instructions provided by nursing for diet and activity.     Follow-up with     Future Appointments  Date Time Provider Sancho Harmon   6/7/2018 8:00 AM Wolfgang Jean Baptiste  [address]       Signed:  Wolfgang Jean Baptiste MD  5/24/2018  8:08 AM

## 2018-05-24 NOTE — TELEPHONE ENCOUNTER
Request for Lasix 20 mg daily. Last office visit 4/12/18, next office visit 6/7/18. Refills per verbal order from Dr. Donn Cormier.

## 2018-06-07 ENCOUNTER — OFFICE VISIT (OUTPATIENT)
Dept: CARDIOLOGY CLINIC | Age: 78
End: 2018-06-07

## 2018-06-07 VITALS
SYSTOLIC BLOOD PRESSURE: 152 MMHG | HEART RATE: 81 BPM | WEIGHT: 183 LBS | BODY MASS INDEX: 26.2 KG/M2 | HEIGHT: 70 IN | DIASTOLIC BLOOD PRESSURE: 84 MMHG

## 2018-06-07 DIAGNOSIS — I45.10 RBBB: ICD-10-CM

## 2018-06-07 DIAGNOSIS — Z86.79 S/P ABLATION OF ATRIAL FIBRILLATION: ICD-10-CM

## 2018-06-07 DIAGNOSIS — I49.3 ASYMPTOMATIC PVCS: ICD-10-CM

## 2018-06-07 DIAGNOSIS — Z98.890 S/P ABLATION OF ATRIAL FIBRILLATION: ICD-10-CM

## 2018-06-07 DIAGNOSIS — I48.19 PERSISTENT ATRIAL FIBRILLATION (HCC): Primary | ICD-10-CM

## 2018-06-07 DIAGNOSIS — I10 ESSENTIAL HYPERTENSION: ICD-10-CM

## 2018-06-07 NOTE — PROGRESS NOTES
Cardiac Electrophysiology Office Note     Subjective:      Russell Morris is a [de-identified] y.o. male patient who is seen for evaluation of atrial fibrillation post ablation 2 weeks ago  He was kindly referred by Dr. Katherine Alvarado. He was tired when he played golf for exercise before ablation. The patient taking anticoagulation Eliquis and complained about easy bruising but no GI or  bleeding  Since ablation he had sore throat from intubation but no other symptoms of chest pain, SAVAGE or dizziness  Echo 2017 with moderate MR TR and ATILIO    PMhx hypertension. No DM, CHF, MI. His wife has atrial fibrillation and she takes coumadin. Social hx: no tobacco. 1-2 beers daily. He plays golf. [employer]. . Family hx: No family hx of CAD. Parents  of old age    Patient Active Problem List   Diagnosis Code    Acne rosacea L71.9    Hypercholesteremia E78.00    HTN (hypertension) I10    Thrombocytopenia ([address] D69.6    Advance care planning Z71.89    Advanced care planning/counseling discussion Z71.89    persistent atrial fibrillation I48.91    S/P ablation of atrial fibrillation Z98.890, Z86.79    Persistent atrial fibrillation (HCC) I48.1     Current Outpatient Prescriptions   Medication Sig Dispense Refill    flecainide (TAMBOCOR) 50 mg tablet Take 1 Tab by mouth two (2) times a day. 60 Tab 1    lisinopril (PRINIVIL, ZESTRIL) 20 mg tablet TAKE 1 TABLET BY MOUTH DAILY 90 Tab 0    apixaban (ELIQUIS) 5 mg tablet Take 1 Tab by mouth two (2) times a day. 180 Tab 1    dilTIAZem CD (CARDIZEM CD) 180 mg ER capsule Take 1 Cap by mouth daily. 30 Cap 6    furosemide (LASIX) 20 mg tablet Take 1 Tab by mouth daily.  30 Tab 5     Allergies   Allergen Reactions    Hydrochlorothiazide Rash    Pcn [Penicillins] Other (comments)     MOUTH SORE     Past Medical History:   Diagnosis Date    Acne rosacea 2010    HTN (hypertension) 2010    Hypercholesteremia 2010     No past surgical history Family History   Problem Relation Age of Onset    Lung Disease Father      Social History   Substance Use Topics    Smoking status: Former Smoker    Smokeless tobacco: Never Used      Comment: 1968    Alcohol use Yes      Review of Systems:   Constitutional: Negative for fever, chills, weight loss   HEENT: Negative for nosebleeds, vision changes. Respiratory: Negative for cough, hemoptysis, sputum production, and wheezing. Cardiovascular: Negative for chest pain, no orthopnea, claudication, no syncope, and PND. Gastrointestinal: Negative for nausea, vomiting, diarrhea, constipation, blood in stool and melena. Genitourinary: Negative for dysuria, and hematuria. Musculoskeletal: Negative for myalgias   Skin: Negative for rash. Heme: Does not bleed   Neurological: Negative for speech change and focal weakness     Objective:     Visit Vitals    /84 (BP 1 Location: Left arm, BP Patient Position: Sitting)    Pulse 81    Ht 5' 10\" (1.778 m)    Wt 183 lb (83 kg)    BMI 26.26 kg/m2      Physical Exam:   Constitutional: Well-nourished. No distress. Head: Normocephalic and atraumatic. Eyes: Pupils are equal, round   Neck: supple. No JVD present. Cardiovascular: Normal rate, regular rhythm. Exam reveals no gallop and no friction rub. No murmur heard. Pulmonary/Chest: Effort normal and breath sounds normal. No wheezes. Abdominal: Soft, no tenderness. Musculoskeletal: no edema   Neurological: alert,oriented. Skin: Skin is warm and dry, bruises on the hands  Psychiatric: normal mood and affect. Behavior is normal. Judgment and thought content normal.     ECG NSR first degree av block, RBBB    Assessment/Plan:       ICD-10-CM ICD-9-CM    1. Persistent atrial fibrillation (HCC) I48.1 427.31 AMB POC EKG ROUTINE W/ 12 LEADS, INTER & REP   2. RBBB I45.10 426.4    3. Essential hypertension I10 401.9    4. Asymptomatic PVCs I49.3 427.69    5.  S/P ablation of atrial fibrillation Z98.890 V45.89 Z86.79     He is doing well  Continue with current meds  bp a bit high but it could be white coat related hypertension  I explained AFIB symptoms that may recur during blanking period  Will set up follow up with holter 3 months visit    Thank you for involving me in this patient's care and please call with further concerns or questions. Joe Ricardo M.D.   Electrophysiology/Cardiology  [facility]  [address], [address] , [facility] [address]  [facility], [address] [place] [address]  [phone]

## 2018-06-07 NOTE — PROGRESS NOTES
Verified pharmacy with patient. Verified patient with two types of identifiers. Verified medications with the patient.

## 2018-06-07 NOTE — MR AVS SNAPSHOT
[address] [facility] [address] [facility] 
[phone] Patient: Ranjith Moreno MRN: [id number]:[id number] Visit Information Date & Time Provider Department Dept. Phone Encounter #  
 6/7/2018  8:00 AM Hilda Ozuna MD [facility] Breanna Maynard [phone] [id number] Follow-up Instructions Return in about 3 months (around 9/7/2018). Upcoming Health Maintenance Date Due DTaP/Tdap/Td series (1 - Tdap) 3/27/1959 Pneumococcal 65+ Low/Medium Risk (2 of 2 - PPSV23) 3/8/2017 GLAUCOMA SCREENING Q2Y 3/7/2018 MEDICARE YEARLY EXAM 4/19/2018 Influenza Age 5 to Adult 8/1/2018 COLONOSCOPY 12/2/2024 Allergies as of 6/7/2018  Review Complete On: 6/7/2018 By: Hilda Ozuna MD  
  
 Severity Noted Reaction Type Reactions Hydrochlorothiazide  06/08/2017    Rash Pcn [Penicillins]  06/01/2010    Other (comments) MOUTH SORE Current Immunizations  Reviewed on 10/3/2017 Name Date Influenza High Dose Vaccine PF 10/3/2017 Influenza Vaccine 10/1/2016, 3/8/2016, 10/21/2014 Pneumococcal Conjugate (PCV-13) 3/8/2016 Not reviewed this visit You Were Diagnosed With   
  
 Codes Comments Persistent atrial fibrillation (HCC)    -  Primary ICD-10-CM: I48.1 ICD-9-CM: 427.31   
 RBBB     ICD-10-CM: I45.10 ICD-9-CM: 426.4 Essential hypertension     ICD-10-CM: I10 
ICD-9-CM: 401.9 Asymptomatic PVCs     ICD-10-CM: I49.3 ICD-9-CM: 427.69 S/P ablation of atrial fibrillation     ICD-10-CM: Z98.890, Z86.79 
ICD-9-CM: V45.89 Vitals BP Pulse Height(growth percentile) Weight(growth percentile) BMI Smoking Status 152/84 (BP 1 Location: Left arm, BP Patient Position: Sitting) 81 5' 10\" (1.778 m) 183 lb (83 kg) 26.26 kg/m2 Former Smoker Vitals History BMI and BSA Data Body Mass Index Body Surface Area  
 26.26 kg/m 2 2.02 m 2 Preferred Pharmacy Pharmacy Name Phone [address] [facility] STORE [address] [address] AT [facility] 91 [phone] Your Updated Medication List  
  
   
This list is accurate as of 6/7/18  8:27 AM.  Always use your most recent med list.  
  
  
  
  
 apixaban 5 mg tablet Commonly known as:  Boateng [place] Take 1 Tab by mouth two (2) times a day. dilTIAZem  mg ER capsule Commonly known as:  CARDIZEM CD Take 1 Cap by mouth daily. flecainide 50 mg tablet Commonly known as:  TAMBOCOR Take 1 Tab by mouth two (2) times a day. furosemide 20 mg tablet Commonly known as:  LASIX Take 1 Tab by mouth daily. lisinopril 20 mg tablet Commonly known as:  PRINIVIL, ZESTRIL  
TAKE 1 TABLET BY MOUTH DAILY We Performed the Following AMB POC EKG ROUTINE W/ 12 LEADS, INTER & REP [75584 CPT(R)] Follow-up Instructions Return in about 3 months (around 9/7/2018). To-Do List   
 06/07/2018 ECG:  CARDIAC HOLTER MONITOR, 24 HOURS Patient Instructions You will be scheduled for a 3 month follow up with a 24 hour holter 1 week prior to your appointment. Introducing [facility]! Dear Bakari Villanueva: 
Thank you for requesting a Janrain account. Our records indicate that you already have an active Janrain account. You can access your account anytime at [email or web address] Did you know that you can access your hospital and ER discharge instructions at any time in Janrain? You can also review all of your test results from your hospital stay or ER visit. Additional Information If you have questions, please visit the Frequently Asked Questions section of the Janrain website at [email or web address] Remember, Janrain is NOT to be used for urgent needs. For medical emergencies, dial 911. Now available from your iPhone and Android! Please provide this summary of care documentation to your next provider. Your primary care clinician is listed as MAIDA HARLEY. If you have any questions after today's visit, please call [phone].

## 2018-06-07 NOTE — PATIENT INSTRUCTIONS
You will be scheduled for a 3 month follow up with a 24 hour holter 1 week prior to your appointment.

## 2018-07-30 RX ORDER — FLECAINIDE ACETATE 50 MG/1
TABLET ORAL
Qty: 60 TAB | Refills: 6 | Status: SHIPPED | OUTPATIENT
Start: 2018-07-30 | End: 2019-04-01 | Stop reason: SDUPTHER

## 2018-07-30 NOTE — TELEPHONE ENCOUNTER
Request for flecainide 50mg twice a day. Last office visit 6/7/18, next office visit 10/4/18. Refills per verbal order from Dr. Kang Ortega.

## 2018-09-03 RX ORDER — LISINOPRIL 20 MG/1
TABLET ORAL
Qty: 90 TAB | Refills: 0 | Status: SHIPPED | OUTPATIENT
Start: 2018-09-03 | End: 2018-12-06 | Stop reason: SDUPTHER

## 2018-09-20 ENCOUNTER — CLINICAL SUPPORT (OUTPATIENT)
Dept: CARDIOLOGY CLINIC | Age: 78
End: 2018-09-20

## 2018-09-20 DIAGNOSIS — I10 ESSENTIAL HYPERTENSION: ICD-10-CM

## 2018-09-20 DIAGNOSIS — I45.10 RBBB: ICD-10-CM

## 2018-09-20 DIAGNOSIS — Z98.890 S/P ABLATION OF ATRIAL FIBRILLATION: ICD-10-CM

## 2018-09-20 DIAGNOSIS — Z86.79 S/P ABLATION OF ATRIAL FIBRILLATION: ICD-10-CM

## 2018-09-20 DIAGNOSIS — I49.3 ASYMPTOMATIC PVCS: ICD-10-CM

## 2018-09-20 DIAGNOSIS — I48.19 PERSISTENT ATRIAL FIBRILLATION (HCC): Primary | ICD-10-CM

## 2018-09-20 NOTE — PROGRESS NOTES
Monitor placed on patient. Patient educated on monitor. Pt verbalized understanding and denies any questions at this time.

## 2018-09-27 RX ORDER — DILTIAZEM HYDROCHLORIDE 180 MG/1
CAPSULE, EXTENDED RELEASE ORAL
Qty: 30 CAP | Refills: 5 | Status: SHIPPED | OUTPATIENT
Start: 2018-09-27 | End: 2019-04-01 | Stop reason: SDUPTHER

## 2018-09-27 NOTE — TELEPHONE ENCOUNTER
Request for Cartia  mg daily. Last office visit 6/7/18, next office visit 10/4/18. Refills per verbal order from Dr. Oscar Frankel.

## 2018-10-02 ENCOUNTER — DOCUMENTATION ONLY (OUTPATIENT)
Dept: CARDIOLOGY CLINIC | Age: 78
End: 2018-10-02

## 2018-10-02 NOTE — PROGRESS NOTES
holter 1 PVC  Future Appointments  Date Time Provider Sancho Eden   10/4/2018 3:00 PM Stephanie Edwards  [address]

## 2018-10-04 ENCOUNTER — OFFICE VISIT (OUTPATIENT)
Dept: CARDIOLOGY CLINIC | Age: 78
End: 2018-10-04

## 2018-10-04 VITALS
RESPIRATION RATE: 12 BRPM | OXYGEN SATURATION: 96 % | WEIGHT: 184 LBS | BODY MASS INDEX: 26.34 KG/M2 | HEIGHT: 70 IN | DIASTOLIC BLOOD PRESSURE: 72 MMHG | SYSTOLIC BLOOD PRESSURE: 136 MMHG | HEART RATE: 70 BPM

## 2018-10-04 DIAGNOSIS — I48.19 PERSISTENT ATRIAL FIBRILLATION (HCC): Primary | ICD-10-CM

## 2018-10-04 DIAGNOSIS — Z86.79 S/P ABLATION OF ATRIAL FIBRILLATION: ICD-10-CM

## 2018-10-04 DIAGNOSIS — I49.3 ASYMPTOMATIC PVCS: ICD-10-CM

## 2018-10-04 DIAGNOSIS — I10 ESSENTIAL HYPERTENSION: ICD-10-CM

## 2018-10-04 DIAGNOSIS — Z51.81 ENCOUNTER FOR MONITORING FLECAINIDE THERAPY: ICD-10-CM

## 2018-10-04 DIAGNOSIS — Z98.890 S/P ABLATION OF ATRIAL FIBRILLATION: ICD-10-CM

## 2018-10-04 DIAGNOSIS — I45.10 RBBB: ICD-10-CM

## 2018-10-04 DIAGNOSIS — Z79.899 ENCOUNTER FOR MONITORING FLECAINIDE THERAPY: ICD-10-CM

## 2018-10-04 NOTE — PROGRESS NOTES
Cardiac Electrophysiology Office Note Subjective:  
  
Dorenda Sandifer is a [de-identified] y.o. male patient who is seen for evaluation of atrial fibrillation post ablation in May He was kindly referred by Dr. Elias Burns. Since ablation in May, the patient has not had recurrent atrial fibrillation. He has a better functional capacity. He does exercise. The Holter has shown only one PVC. Echo 2017 with moderate MR TR and ATILIO 
 
PMhx hypertension. No DM, CHF, MI. His wife has atrial fibrillation and she takes coumadin. Social hx: no tobacco. 1-2 beers daily. He plays golf. [employer]. . Family hx: No family hx of CAD. Parents  of old age Patient Active Problem List  
Diagnosis Code  Acne rosacea L71.9  Hypercholesteremia E78.00  
 HTN (hypertension) I10  Thrombocytopenia ([facility] [address] D69.6  Advance care planning Z71.89  
 Advanced care planning/counseling discussion Z70.80  
 persistent atrial fibrillation I48.91  
 S/P ablation of atrial fibrillation Z98.890, Z86.79  
 Persistent atrial fibrillation (HCC) I48.1 Current Outpatient Prescriptions Medication Sig Dispense Refill  CARTIA  mg ER capsule TAKE 1 CAPSULE BY MOUTH DAILY 30 Cap 5  
 lisinopril (PRINIVIL, ZESTRIL) 20 mg tablet TAKE 1 TABLET BY MOUTH DAILY 90 Tab 0  
 flecainide (TAMBOCOR) 50 mg tablet TAKE 1 TABLET BY MOUTH TWICE DAILY 60 Tab 6  
 apixaban (ELIQUIS) 5 mg tablet Take 1 Tab by mouth two (2) times a day. 180 Tab 1  
 furosemide (LASIX) 20 mg tablet Take 1 Tab by mouth daily. 30 Tab 5 Allergies Allergen Reactions  Hydrochlorothiazide Rash  Pcn [Penicillins] Other (comments) MOUTH SORE Past Medical History:  
Diagnosis Date  Acne rosacea 2010  
 HTN (hypertension) 2010  Hypercholesteremia 2010 No past surgical history Family History Problem Relation Age of Onset  Lung Disease Father Social History Substance Use Topics  Smoking status: Former Smoker  Smokeless tobacco: Never Used Comment: 1968  Alcohol use Yes Review of Systems:  
Constitutional: Negative for fever, chills, weight loss HEENT: Negative for nosebleeds, vision changes. Respiratory: Negative for cough, hemoptysis, sputum production, and wheezing. Cardiovascular: Negative for chest pain, no orthopnea, claudication, no syncope, and PND. Gastrointestinal: Negative for nausea, vomiting, diarrhea, constipation, blood in stool and melena. Genitourinary: Negative for dysuria, and hematuria. Musculoskeletal: Negative for myalgias Skin: Negative for rash. Heme: Does not bleed Neurological: Negative for speech change and focal weakness Objective:  
 
Visit Vitals  /72 (BP 1 Location: Left arm, BP Patient Position: Sitting)  Pulse 70  Resp 12  Ht 5' 10\" (1.778 m)  Wt 184 lb (83.5 kg)  SpO2 96%  BMI 26.4 kg/m2 Physical Exam:  
Constitutional: Well-nourished. No distress. Head: Normocephalic and atraumatic. Eyes: Pupils are equal, round Neck: supple. No JVD present. Cardiovascular: Normal rate, regular rhythm. Exam reveals no gallop and no friction rub. No murmur heard. Pulmonary/Chest: Effort normal and breath sounds normal. No wheezes. Abdominal: Soft, no tenderness. Musculoskeletal: no edema Neurological: alert, oriented. Skin: Skin is warm and dry, bruises on the hands Psychiatric: normal mood and affect. Behavior is normal. Judgment and thought content normal.  
 
Assessment/Plan: ICD-10-CM ICD-9-CM 1. Persistent atrial fibrillation (HCC) I48.1 427.31   
2. RBBB I45.10 426.4 3. Essential hypertension I10 401.9 4. S/P ablation of atrial fibrillation Z98.890 V45.89   
 Z86.79    
5. Asymptomatic PVCs I49.3 427.69   
6. Encounter for monitoring flecainide therapy Z51.81 V58.83   
 Z79.899 V58.69 He is doing well Continue with current meds He does not have symptoms related to atrial fibrillation, PACs or PVCs and Holter showed only one PVC. He is currently taking low dose Flecainide 50 mg twice a day and he has a chronic right bundle branch block. I discussed with him about discontinuing medication, but he is more comfortable with continuing a low dose of Flecainide and Cardizem 180 mg. He has chronic renal failure with creatinine level of 1.4. The patient is on Lisinopril and sometimes has a dry cough, but he did not switch to Losartan either. I told him he must continue with NOAC long term. See me in 6 months Future Appointments Date Time Provider Sancho Eden 4/11/2019 2:40 PM Aaron Calzada  [address] Thank you for involving me in this patient's care and please call with further concerns or questions. Keshav Salgado M.D. Electrophysiology/Cardiology [address] [facility], [address] 
[phone]

## 2018-10-04 NOTE — PROGRESS NOTES
1. Have you been to the ER, urgent care clinic since your last visit? Hospitalized since your last visit? No 
 
2. Have you seen or consulted any other health care providers outside of the [address] [place] [address] since your last visit? Include any pap smears or colon screening. No  
 
Chief Complaint Patient presents with  Irregular Heart Beat  Hypertension  Follow-up  Medication Problem Pt states would like to discuss medication causing dry cough. Visit Vitals  /72 (BP 1 Location: Left arm, BP Patient Position: Sitting)  Pulse 70  Resp 12  Ht 5' 10\" (1.778 m)  Wt 184 lb (83.5 kg)  SpO2 96%  BMI 26.4 kg/m2

## 2018-10-04 NOTE — MR AVS SNAPSHOT
[address] [facility] [address] [facility] 
[phone] Patient: Jess Leone MRN: [id number] Visit Information Date & Time Provider Department Dept. Phone Encounter #  
 10/4/2018  3:00 PM Lydia Pedro MD [facility] Teri Sharpe [phone] [id number] Upcoming Health Maintenance Date Due DTaP/Tdap/Td series (1 - Tdap) 3/27/1961 Shingrix Vaccine Age 50> (1 of 2) 3/27/1988 Pneumococcal 65+ Low/Medium Risk (2 of 2 - PPSV23) 3/8/2017 GLAUCOMA SCREENING Q2Y 3/7/2018 MEDICARE YEARLY EXAM 4/19/2018 Influenza Age 5 to Adult 8/1/2018 COLONOSCOPY 12/2/2024 Allergies as of 10/4/2018  Review Complete On: 10/4/2018 By: Lydia Pedro MD  
  
 Severity Noted Reaction Type Reactions Hydrochlorothiazide  06/08/2017    Rash Pcn [Penicillins]  06/01/2010    Other (comments) MOUTH SORE Current Immunizations  Reviewed on 10/3/2017 Name Date Influenza High Dose Vaccine PF 10/3/2017 Influenza Vaccine 10/1/2016, 3/8/2016, 10/21/2014 Pneumococcal Conjugate (PCV-13) 3/8/2016 Not reviewed this visit You Were Diagnosed With   
  
 Codes Comments Persistent atrial fibrillation (HCC)    -  Primary ICD-10-CM: I48.1 ICD-9-CM: 427.31   
 RBBB     ICD-10-CM: I45.10 ICD-9-CM: 426.4 Essential hypertension     ICD-10-CM: I10 
ICD-9-CM: 401.9 S/P ablation of atrial fibrillation     ICD-10-CM: Z98.890, Z86.79 
ICD-9-CM: V45.89 Vitals BP Pulse Resp Height(growth percentile) Weight(growth percentile) SpO2  
 136/72 (BP 1 Location: Left arm, BP Patient Position: Sitting) 70 12 5' 10\" (1.778 m) 184 lb (83.5 kg) 96% BMI Smoking Status 26.4 kg/m2 Former Smoker Vitals History BMI and BSA Data Body Mass Index Body Surface Area  
 26.4 kg/m 2 2.03 m 2 Preferred Pharmacy Pharmacy Name Phone [address] AT [place] [address] [phone] Your Updated Medication List  
  
   
This list is accurate as of 10/4/18  3:18 PM.  Always use your most recent med list.  
  
  
  
  
 apixaban 5 mg tablet Commonly known as:  Sedonia Hazy Take 1 Tab by mouth two (2) times a day. CARTIA  mg ER capsule Generic drug:  dilTIAZem CD  
TAKE 1 CAPSULE BY MOUTH DAILY flecainide 50 mg tablet Commonly known as:  TAMBOCOR  
TAKE 1 TABLET BY MOUTH TWICE DAILY  
  
 furosemide 20 mg tablet Commonly known as:  LASIX Take 1 Tab by mouth daily. lisinopril 20 mg tablet Commonly known as:  PRINIVIL, ZESTRIL  
TAKE 1 TABLET BY MOUTH DAILY Introducing [facility]! Dear Kiersten Munguia: 
Thank you for requesting a Summitour account. Our records indicate that you already have an active Summitour account. You can access your account anytime at [email or web address] Did you know that you can access your hospital and ER discharge instructions at any time in Summitour? You can also review all of your test results from your hospital stay or ER visit. Additional Information If you have questions, please visit the Frequently Asked Questions section of the Summitour website at [email or web address] Remember, Summitour is NOT to be used for urgent needs. For medical emergencies, dial 911. Now available from your iPhone and Android! Please provide this summary of care documentation to your next provider. Your primary care clinician is listed as MAIDA HARLEY. If you have any questions after today's visit, please call [phone].

## 2018-11-15 RX ORDER — APIXABAN 5 MG/1
TABLET, FILM COATED ORAL
Qty: 180 TAB | Refills: 1 | Status: SHIPPED | OUTPATIENT
Start: 2018-11-15 | End: 2019-06-16 | Stop reason: SDUPTHER

## 2018-11-15 NOTE — TELEPHONE ENCOUNTER
Request for Eliquis 5 mg BID. Last office visit 10/4/18, next office visit 4/11/19. Refills per verbal order from Dr. Ruddy Marx.

## 2018-12-06 RX ORDER — LISINOPRIL 20 MG/1
TABLET ORAL
Qty: 90 TAB | Refills: 0 | Status: SHIPPED | OUTPATIENT
Start: 2018-12-06 | End: 2019-03-04 | Stop reason: SDUPTHER

## 2018-12-12 RX ORDER — FUROSEMIDE 20 MG/1
TABLET ORAL
Qty: 30 TAB | Refills: 5 | Status: SHIPPED | OUTPATIENT
Start: 2018-12-12 | End: 2019-04-11

## 2018-12-12 NOTE — TELEPHONE ENCOUNTER
Request for Lasix 20 mg daily. Last office visit 10/4/18, next office visit 4/11/19. Refills per verbal order from Dr. Michel Pierce.

## 2019-03-04 RX ORDER — LISINOPRIL 20 MG/1
TABLET ORAL
Qty: 90 TAB | Refills: 1 | Status: SHIPPED | OUTPATIENT
Start: 2019-03-04 | End: 2019-08-25 | Stop reason: SDUPTHER

## 2019-03-04 NOTE — TELEPHONE ENCOUNTER
Requested Prescriptions     Signed Prescriptions Disp Refills    lisinopril (PRINIVIL, ZESTRIL) 20 mg tablet 90 Tab 1     Sig: TAKE 1 TABLET BY MOUTH DAILY     Authorizing Provider: THEA KAMINSKI     Ordering User: Jorge Garrison   Date Time Provider Sancho Harmon   4/11/2019  3:00 PM Rene Tovar  [address]

## 2019-04-02 DIAGNOSIS — I48.19 PERSISTENT ATRIAL FIBRILLATION (HCC): Primary | ICD-10-CM

## 2019-04-02 RX ORDER — FLECAINIDE ACETATE 50 MG/1
TABLET ORAL
Qty: 180 TAB | Refills: 1 | Status: SHIPPED | OUTPATIENT
Start: 2019-04-02 | End: 2019-04-11

## 2019-04-02 RX ORDER — FLECAINIDE ACETATE 50 MG/1
TABLET ORAL
Qty: 60 TAB | Refills: 0 | Status: SHIPPED | OUTPATIENT
Start: 2019-04-02 | End: 2019-04-02 | Stop reason: SDUPTHER

## 2019-04-02 RX ORDER — DILTIAZEM HYDROCHLORIDE 180 MG/1
CAPSULE, EXTENDED RELEASE ORAL
Qty: 30 CAP | Refills: 0 | Status: SHIPPED | OUTPATIENT
Start: 2019-04-02 | End: 2019-04-02 | Stop reason: SDUPTHER

## 2019-04-02 RX ORDER — DILTIAZEM HYDROCHLORIDE 180 MG/1
CAPSULE, EXTENDED RELEASE ORAL
Qty: 90 CAP | Refills: 1 | Status: SHIPPED | OUTPATIENT
Start: 2019-04-02 | End: 2019-10-04 | Stop reason: SDUPTHER

## 2019-04-02 NOTE — TELEPHONE ENCOUNTER
Requested Prescriptions     Signed Prescriptions Disp Refills    flecainide (TAMBOCOR) 50 mg tablet 60 Tab 0     Sig: TAKE 1 TABLET BY MOUTH TWICE DAILY     Authorizing Provider: Álvaro Bautista     Ordering User: Yulisa La    CARTIA  mg ER capsule 30 Cap 0     Sig: TAKE 1 CAPSULE BY MOUTH DAILY     Authorizing Provider: Álvaro Bautista     Ordering User: Yulisa La       Per Dr. Carlos Sandoval   Date Time Provider [facility]   4/11/2019  3:00 PM Verna Briceño  [address]

## 2019-04-11 ENCOUNTER — OFFICE VISIT (OUTPATIENT)
Dept: CARDIOLOGY CLINIC | Age: 79
End: 2019-04-11

## 2019-04-11 VITALS
OXYGEN SATURATION: 97 % | BODY MASS INDEX: 27.69 KG/M2 | SYSTOLIC BLOOD PRESSURE: 120 MMHG | RESPIRATION RATE: 16 BRPM | DIASTOLIC BLOOD PRESSURE: 62 MMHG | HEART RATE: 74 BPM | WEIGHT: 193.4 LBS | HEIGHT: 70 IN

## 2019-04-11 DIAGNOSIS — I49.3 ASYMPTOMATIC PVCS: ICD-10-CM

## 2019-04-11 DIAGNOSIS — Z86.79 S/P ABLATION OF ATRIAL FIBRILLATION: ICD-10-CM

## 2019-04-11 DIAGNOSIS — I34.0 MODERATE MITRAL REGURGITATION BY PRIOR ECHOCARDIOGRAM: ICD-10-CM

## 2019-04-11 DIAGNOSIS — I10 ESSENTIAL HYPERTENSION: ICD-10-CM

## 2019-04-11 DIAGNOSIS — I44.0 FIRST DEGREE AV BLOCK: ICD-10-CM

## 2019-04-11 DIAGNOSIS — I45.10 RBBB: ICD-10-CM

## 2019-04-11 DIAGNOSIS — I48.19 PERSISTENT ATRIAL FIBRILLATION (HCC): Primary | ICD-10-CM

## 2019-04-11 DIAGNOSIS — Z98.890 S/P ABLATION OF ATRIAL FIBRILLATION: ICD-10-CM

## 2019-04-11 NOTE — PATIENT INSTRUCTIONS
Stop Flecainide    You will need to follow up in clinic with Dr. Pina Merritt in 6 months with an Echocardiogram.

## 2019-04-11 NOTE — PROGRESS NOTES
Cardiac Electrophysiology Office Note     Subjective:      Brenden Alexander is a 78 y.o. male patient who is seen for follow up of atrial fibrillation. He is s/p ablation of atrial fibrillation on 05/23/2018    Post ablation holter showed no AF; PVCs & SB noted with 1st degree AVB, RBBB. Prior to ablation, he states he was not symptomatic, denies any further symptoms. He denies any recent palpitations, SOB, PND, orthopnea, lightheadedness, or syncope. BP today 120/62, well controlled on current regimen. Daily weights have been stable at home, but he reports that weight here today is increased. No early satiety. He does have bilateral peripheral edema, but states this has been stable. Anticoagulated with Eliquis, denies bleeding issues. He has CBC & BMP typically ordered by Dr. Aamir Sumner. Cr elevated but stable, last 1.39 in 05/2018. Previous:  Holter (09/20/2018): SB/NSR with 1st degree AVB, RBBB, occasional PVCs. MAT (05/23/2018): LVEF 55-60%, no RWMA. LA mod dilated. RA mild to mod dilated. Mod MR. Echo (06/16/2017): LVEF 50-55%, no RWMA. Mod ATILIO. Mod MR. Mod TR. Followed by Dr. Gabrielle López. Patient Active Problem List   Diagnosis Code    Acne rosacea L71.9    Hypercholesteremia E78.00    HTN (hypertension) I10    Thrombocytopenia ([facility] [address] D69.6    Advance care planning Z71.89    Advanced care planning/counseling discussion Z71.89    persistent atrial fibrillation I48.91    S/P ablation of atrial fibrillation Z98.890, Z86.79    Persistent atrial fibrillation (HCC) I48.1     Current Outpatient Medications   Medication Sig Dispense Refill    CARTIA  mg ER capsule TAKE 1 CAPSULE BY MOUTH DAILY 90 Cap 1    ELIQUIS 5 mg tablet TAKE 1 TABLET TWICE A  Tab 1    furosemide (LASIX) 20 mg tablet Take 1 Tab by mouth daily.  30 Tab 5    lisinopril (PRINIVIL, ZESTRIL) 20 mg tablet TAKE 1 TABLET BY MOUTH DAILY 90 Tab 1     Allergies   Allergen Reactions    Hydrochlorothiazide Rash    Pcn [Penicillins] Other (comments)     MOUTH SORE     Past Medical History:   Diagnosis Date    Acne rosacea 6/1/2010    HTN (hypertension) 6/1/2010    Hypercholesteremia 6/1/2010     No past surgical history   Family History   Problem Relation Age of Onset    Lung Disease Father      Social History     Tobacco Use    Smoking status: Former Smoker    Smokeless tobacco: Never Used    Tobacco comment: 1968   Substance Use Topics    Alcohol use: Yes      Review of Systems:   Constitutional: Negative for fever, chills, weight loss   HEENT: Negative for nosebleeds, vision changes. Respiratory: Negative for cough, hemoptysis, sputum production, and wheezing. Cardiovascular: Negative for chest pain, no orthopnea, claudication, no syncope, and PND.  + chronic stable leg edema  Gastrointestinal: Negative for nausea, vomiting, diarrhea, constipation, blood in stool and melena. Genitourinary: Negative for dysuria, and hematuria. Musculoskeletal: Negative for myalgias   Skin: Negative for rash. Heme: Does not bleed   Neurological: Negative for speech change and focal weakness     Objective:     Visit Vitals  /62 (BP 1 Location: Left arm, BP Patient Position: Sitting)   Pulse 74   Resp 16   Ht 5' 10\" (1.778 m)   Wt 193 lb 6.4 oz (87.7 kg)   SpO2 97%   BMI 27.75 kg/m²      Physical Exam:   Constitutional: Well-nourished. No distress. Head: Normocephalic and atraumatic. Eyes: Pupils are equal, round . Neck: Supple. No JVD present. Cardiovascular: Normal rate, regular rhythm. Exam reveals no gallop and no friction rub. Systolic murmur. Pulmonary/Chest: Effort normal and breath sounds normal. No wheezes. Abdominal: Soft, no tenderness. Musculoskeletal: 1+ bilateral lower extremity edema. Neurological: Alert,oriented. Skin: Skin is warm and dry. Psychiatric: Normal mood and affect.  Behavior is normal. Judgment and thought content normal.     ECG: NSR first degree av block, RBBB. Assessment/Plan:       ICD-10-CM ICD-9-CM    1. Persistent atrial fibrillation (HCC) I48.1 427.31 AMB POC EKG ROUTINE W/ 12 LEADS, INTER & REP      ECHO ADULT COMPLETE   2. RBBB I45.10 426.4 ECHO ADULT COMPLETE   3. Essential hypertension I10 401.9 ECHO ADULT COMPLETE   4. S/P ablation of atrial fibrillation Z98.890 V45.89 ECHO ADULT COMPLETE    Z86.79     5. Asymptomatic PVCs I49.3 427.69 ECHO ADULT COMPLETE   6. First degree AV block I44.0 426.11 ECHO ADULT COMPLETE   7. Moderate mitral regurgitation by prior echocardiogram I34.0 424.0      Mr. Garcia denies any known recurrence of AF since his ablation in 05/2018. Post ablation holter was 1 PVC; NSR today with RBBB. He has also first degree av block  Stop flecainide. Known MR, has lower extremity edema but no SOB. BP well controlled, will continue lisinopril, diltiazem, & furosemide as previously prescribed. He will continue daily weights at home. Most recent Cr elevated but stable. He requests BMP & CBC for routine monitoring. Follow up in 6 months. Will obtain echo at that time to reassess MR. It was moderate range. Future Appointments   Date Time Provider Sancho Harmon   10/17/2019  1:00 PM [facility] [id number] [address]   10/17/2019  2:00 PM Carlo Yeh  [address]       Thank you for involving me in this patient's care and please call with further concerns or questions. Wolfgang Merlin, M.D.   Electrophysiology/Cardiology  [address] [facility] [place]  [address], [place] [address], [facility] [address]  [address] [facility], [address]                             [address] [place] [address]  [phone]

## 2019-04-11 NOTE — PROGRESS NOTES
Cardiac Electrophysiology Office Note Subjective:  
  
Lauryn Rojas is a 78 y.o. male patient who is seen for follow up of atrial fibrillation. He is s/p ablation of atrial fibrillation on 05/23/2018. Post ablation holter showed no AF; PVCs & SB noted with 1st degree AVB, RBBB. Prior to ablation, he states he was not symptomatic, denies any further symptoms. He denies any recent palpitations, SOB, PND, orthopnea, lightheadedness, or syncope. BP today 120/62, well controlled on current regimen. Daily weights have been stable at home, but he reports that weight here today is increased. No early satiety. He does have bilateral peripheral edema, but states this has been stable. Anticoagulated with Eliquis, denies bleeding issues. He has CBC & BMP typically ordered by Dr. Dede Cody. Cr elevated but stable, last 1.39 in 05/2018. Previous: 
Holter (09/20/2018): SB/NSR with 1st degree AVB, RBBB, occasional PVCs. MAT (05/23/2018): LVEF 55-60%, no RWMA. LA mod dilated. RA mild to mod dilated. Mod MR. Echo (06/16/2017): LVEF 50-55%, no RWMA. Mod ATILIO. Mod MR. Mod TR. Followed by Dr. Brooklynn Rodrigues. Patient Active Problem List  
Diagnosis Code  Acne rosacea L71.9  Hypercholesteremia E78.00  
 HTN (hypertension) I10  Thrombocytopenia ([facility] [address] D69.6  Advance care planning Z71.89  
 Advanced care planning/counseling discussion Z70.80  
 persistent atrial fibrillation I48.91  
 S/P ablation of atrial fibrillation Z98.890, Z86.79  
 Persistent atrial fibrillation (HCC) I48.1 Current Outpatient Medications Medication Sig Dispense Refill  flecainide (TAMBOCOR) 50 mg tablet TAKE 1 TABLET BY MOUTH TWICE DAILY 180 Tab 1  
 CARTIA  mg ER capsule TAKE 1 CAPSULE BY MOUTH DAILY 90 Cap 1  
 ELIQUIS 5 mg tablet TAKE 1 TABLET TWICE A  Tab 1  
 furosemide (LASIX) 20 mg tablet Take 1 Tab by mouth daily. 30 Tab 5  lisinopril (PRINIVIL, ZESTRIL) 20 mg tablet TAKE 1 TABLET BY MOUTH DAILY 90 Tab 1 Allergies Allergen Reactions  Hydrochlorothiazide Rash  Pcn [Penicillins] Other (comments) MOUTH SORE Past Medical History:  
Diagnosis Date  Acne rosacea 6/1/2010  
 HTN (hypertension) 6/1/2010  Hypercholesteremia 6/1/2010 No past surgical history Family History Problem Relation Age of Onset  Lung Disease Father Social History Tobacco Use  Smoking status: Former Smoker  Smokeless tobacco: Never Used  Tobacco comment: 5319 Substance Use Topics  Alcohol use: Yes Review of Systems:  
Constitutional: Negative for fever, chills, weight loss HEENT: Negative for nosebleeds, vision changes. Respiratory: Negative for cough, hemoptysis, sputum production, and wheezing. Cardiovascular: Negative for chest pain, no orthopnea, claudication, no syncope, and PND.  + chronic stable leg edema Gastrointestinal: Negative for nausea, vomiting, diarrhea, constipation, blood in stool and melena. Genitourinary: Negative for dysuria, and hematuria. Musculoskeletal: Negative for myalgias Skin: Negative for rash. Heme: Does not bleed Neurological: Negative for speech change and focal weakness Objective:  
 
Visit Vitals /62 (BP 1 Location: Left arm, BP Patient Position: Sitting) Pulse 74 Resp 16 Ht 5' 10\" (1.778 m) Wt 193 lb 6.4 oz (87.7 kg) SpO2 97% BMI 27.75 kg/m² Physical Exam:  
Constitutional: Well-nourished. No distress. Head: Normocephalic and atraumatic. Eyes: Pupils are equal, round . Neck: Supple. No JVD present. Cardiovascular: Normal rate, regular rhythm. Exam reveals no gallop and no friction rub. Systolic murmur. Pulmonary/Chest: Effort normal and breath sounds normal. No wheezes. Abdominal: Soft, no tenderness. Musculoskeletal: 1+ bilateral lower extremity edema. Neurological: Alert,oriented. Skin: Skin is warm and dry. Psychiatric: Normal mood and affect. Behavior is normal. Judgment and thought content normal.  
 
ECG: NSR first degree av block, RBBB. Assessment/Plan: ICD-10-CM ICD-9-CM 1. Persistent atrial fibrillation (HCC) I48.1 427.31 AMB POC EKG ROUTINE W/ 12 LEADS, INTER & REP 2. RBBB I45.10 426.4 3. Essential hypertension I10 401.9 4. S/P ablation of atrial fibrillation Z98.890 V45.89   
 Z86.79    
5. Asymptomatic PVCs I49.3 427.69   
 
Mr. Garcia denies any known recurrence of AF since his ablation in 05/2018. Post ablation holter was negative; NSR today with RBBB. Stop flecainide. Known MR, has lower extremity edema but no SOB. BP well controlled, will continue lisinopril, diltiazem, & furosemide as previously prescribed. He will continue daily weights at home. Most recent Cr elevated but stable. He requests BMP & CBC for routine monitoring. Follow up in 6 months. Will obtain echo at that time to reassess MR. Future Appointments Date Time Provider Sancho Harmon 10/17/2019  1:00  [facility], [id number] [place] [address]  
10/17/2019  2:00 PM Pablo Terry  [address] Thank you for involving me in this patient's care and please call with further concerns or questions. Zeyad Flowers M.D. Electrophysiology/Cardiology [facility] [address], [place] [address], [facility] [address] 
[phone]                                        [phone]

## 2019-06-17 NOTE — TELEPHONE ENCOUNTER
Request for Eliquis 5 mg BID. Last office visit 4/11/19, next office visit 10/17/19. Refills per verbal order from Dr. Kitty Mays.

## 2019-06-21 RX ORDER — FUROSEMIDE 20 MG/1
TABLET ORAL
Qty: 30 TAB | Refills: 5 | Status: SHIPPED | OUTPATIENT
Start: 2019-06-21 | End: 2019-12-22 | Stop reason: SDUPTHER

## 2019-06-21 NOTE — TELEPHONE ENCOUNTER
Request for Lasix 20 mg daily. Last office visit 4/11/19, next office visit 10/17/19. Refills per verbal order from Dr. Finn Jerez.

## 2019-08-26 RX ORDER — LISINOPRIL 20 MG/1
TABLET ORAL
Qty: 90 TAB | Refills: 1 | Status: SHIPPED | OUTPATIENT
Start: 2019-08-26 | End: 2020-03-11

## 2019-08-26 NOTE — TELEPHONE ENCOUNTER
Request for Lisinopril 20 mg daily. Last office visit 4/11/19, next office visit 10/17/19. Refills per verbal order from Dr. Sudeep Haynes.

## 2019-10-04 DIAGNOSIS — I48.19 PERSISTENT ATRIAL FIBRILLATION (HCC): ICD-10-CM

## 2019-10-04 RX ORDER — DILTIAZEM HYDROCHLORIDE 180 MG/1
180 CAPSULE, COATED, EXTENDED RELEASE ORAL DAILY
Qty: 90 CAP | Refills: 3 | Status: SHIPPED | OUTPATIENT
Start: 2019-10-04 | End: 2019-10-17

## 2019-10-04 NOTE — TELEPHONE ENCOUNTER
Request for Cartia 180mg daily. Last office visit 4-11-19, next office visit 10-17-19. Refills per verbal order from Dr. Khalif Fu.

## 2019-10-17 ENCOUNTER — OFFICE VISIT (OUTPATIENT)
Dept: CARDIOLOGY CLINIC | Age: 79
End: 2019-10-17

## 2019-10-17 DIAGNOSIS — I34.0 MITRAL VALVE INSUFFICIENCY, UNSPECIFIED ETIOLOGY: ICD-10-CM

## 2019-10-17 DIAGNOSIS — I10 ESSENTIAL HYPERTENSION: ICD-10-CM

## 2019-10-17 DIAGNOSIS — Z86.79 S/P ABLATION OF ATRIAL FIBRILLATION: ICD-10-CM

## 2019-10-17 DIAGNOSIS — Z98.890 S/P ABLATION OF ATRIAL FIBRILLATION: ICD-10-CM

## 2019-10-17 DIAGNOSIS — I48.19 PERSISTENT ATRIAL FIBRILLATION (HCC): ICD-10-CM

## 2019-10-17 DIAGNOSIS — I48.91 ATRIAL FIBRILLATION WITH RAPID VENTRICULAR RESPONSE (HCC): Primary | ICD-10-CM

## 2019-10-17 DIAGNOSIS — Z01.812 PRE-PROCEDURE LAB EXAM: ICD-10-CM

## 2019-10-17 DIAGNOSIS — I48.0 PAF (PAROXYSMAL ATRIAL FIBRILLATION) (HCC): ICD-10-CM

## 2019-10-17 RX ORDER — DILTIAZEM HYDROCHLORIDE 240 MG/1
240 CAPSULE, COATED, EXTENDED RELEASE ORAL DAILY
Qty: 30 CAP | Refills: 5 | Status: SHIPPED | OUTPATIENT
Start: 2019-10-17 | End: 2020-01-09

## 2019-10-17 NOTE — PATIENT INSTRUCTIONS
Increase Cardizem to 240 mg daily    Your EP Study with Atrial Fibrillation Ablation procedure has been scheduled for 1/8/20 at 1:00 pm, at [facility].    Please report to Admitting Department by 11:00 am, or 2 hours prior to your scheduled procedure. Please bring a list of your current medications and medication bottles, if able, to the hospital on this day. You will be unable to drive after your procedure so please make sure to bring someone with you to your procedure. You will need to have nothing to eat or drink after midnight, the night prior to your procedure. You may have small sips of water, if needed, to take with your medication. You will need labs drawn prior to your procedure. Please go to [facility] to have this done no sooner than 12/8/18 and no later than 1/1/20. You will need a Chest CT to map your Pulmonary Veins. Someone from central scheduling will be contacting you to schedule this test. If you do not hear from them in 1 week, please contact them at [phone]. You will also need to see Dr. Ajay Olmstead Nurse Practitioner, Cori Jenkins, in office prior to your procedure. An appointment has been scheduled for 12/20/19 at 10:20 am.    You should stop your medication, Eliquis, 2 days prior to your scheduled procedure. After your procedure, you will need to follow up with Dr. Eusebio Woodson.  Your follow-up appointment has been scheduled for 1/23/20 at 1:20 pm.

## 2019-10-17 NOTE — PROGRESS NOTES
Cardiac Electrophysiology Office Note     Subjective:      Gume Alcala is a 78 y.o. male patient who is seen for follow up of atrial fibrillation . He is s/p ablation of atrial fibrillation on 05/23/2018 and he has been in NSR until today when he came in for echo   His atria still mild to moderately large but MR is mild  LVEF normal   On echo monitor his AFIB is back and  bpm   mmHg    In the past:    Post ablation holter showed no AF; PVCs & SB noted with 1st degree AVB, RBBB. Prior to ablation, he states he was not symptomatic, denies any further symptoms. He denies any recent palpitations, SOB, PND, orthopnea, lightheadedness, or syncope. Anticoagulated with Eliquis, denies bleeding issues. Previous:  Holter (09/20/2018): SB/NSR with 1st degree AVB, RBBB, occasional PVCs. MAT (05/23/2018): LVEF 55-60%, no RWMA. LA mod dilated. RA mild to mod dilated. Mod MR. Echo (06/16/2017): LVEF 50-55%, no RWMA. Mod ATILIO. Mod MR. Mod TR. Followed by Dr. Yue Jaimes. Patient Active Problem List   Diagnosis Code    Acne rosacea L71.9    Hypercholesteremia E78.00    HTN (hypertension) I10    Thrombocytopenia ([facility] [address] D69.6    Advance care planning Z71.89    Advanced care planning/counseling discussion Z71.89    persistent atrial fibrillation I48.91    S/P ablation of atrial fibrillation Z98.890, Z86.79    Persistent atrial fibrillation I48.19     Current Outpatient Medications   Medication Sig Dispense Refill    dilTIAZem CD (CARTIA XT) 180 mg ER capsule Take 1 Cap by mouth daily. 90 Cap 3    lisinopril (PRINIVIL, ZESTRIL) 20 mg tablet TAKE 1 TABLET BY MOUTH DAILY 90 Tab 1    furosemide (LASIX) 20 mg tablet TAKE 1 TABLET BY MOUTH DAILY 30 Tab 5    apixaban (ELIQUIS) 5 mg tablet Take 1 Tab by mouth two (2) times a day. 180 Tab 1    furosemide (LASIX) 20 mg tablet Take 1 Tab by mouth daily.  30 Tab 5     Allergies   Allergen Reactions    Hydrochlorothiazide Rash    Pcn [Penicillins] Other (comments)     MOUTH SORE     Past Medical History:   Diagnosis Date    Acne rosacea 6/1/2010    HTN (hypertension) 6/1/2010    Hypercholesteremia 6/1/2010     No past surgical history   Family History   Problem Relation Age of Onset    Lung Disease Father      Social History     Tobacco Use    Smoking status: Former Smoker    Smokeless tobacco: Never Used    Tobacco comment: 1968   Substance Use Topics    Alcohol use: Yes      Review of Systems:   Constitutional: Negative for fever, chills, weight loss   HEENT: Negative for nosebleeds, vision changes. Respiratory: Negative for cough, hemoptysis, sputum production, and wheezing. Cardiovascular: Negative for chest pain, no orthopnea, claudication, no syncope, and PND.  + chronic stable leg edema  Gastrointestinal: Negative for nausea, vomiting, diarrhea, constipation, blood in stool and melena. Genitourinary: Negative for dysuria, and hematuria. Musculoskeletal: Negative for myalgias   Skin: Negative for rash. Heme: Does not bleed   Neurological: Negative for speech change and focal weakness     Objective:    mmHg   bpm   Physical Exam:   Constitutional: Well-nourished. No distress. Head: Normocephalic and atraumatic. Eyes: Pupils are equal, round . Neck: Supple. No JVD present. Cardiovascular: fast rate, irregular rhythm. Exam reveals no gallop and no friction rub. 2/6 Systolic murmur. Pulmonary/Chest: Effort normal and breath sounds normal. No wheezes. Abdominal: Soft, no tenderness. Musculoskeletal: 1+ bilateral lower extremity edema. Neurological: Alert,oriented. Skin: Skin is warm and dry. Psychiatric: Normal mood and affect. Behavior is normal. Judgment and thought content normal.        Assessment/Plan:       ICD-10-CM ICD-9-CM    1. Atrial fibrillation with rapid ventricular response ([facility]) I48.91 427.31    2. PAF (paroxysmal atrial fibrillation) ([facility]) I48.0 427.31    3.  S/P ablation of atrial fibrillation Z98.890 V45.89     Z86.79     4. Essential hypertension I10 401.9    5. Mitral valve insufficiency, unspecified etiology I34.0 424.0      Mr. Reyes Nuñez has recurrent AFIB and probably PAF  He had persistent AFIB ablation May 2018 and has done well  His MR is improved  LVEF is normal  He improved with NSR so after discussion with him he wants to have another AFIB ablation to maintain NSR  For now until ablation his V rate needs control more and I recommend with mildly elevated BP he increase cardizem to 240 mg every day  He will hold eliquis 2 days before the ablation  He will return to see Heladio Solis NP for follow up before ablation and he can also discuss further questions or concerns since his procedure is in December 2019    Future Appointments   Date Time Provider Sancho Harmon   12/20/2019 10:20 AM Carolyn Velasco  [address]   1/23/2020  1:20 PM Zulay Ruiz  [address]         Thank you for involving me in this patient's care and please call with further concerns or questions. Gael Senior M.D.   Electrophysiology/Cardiology  [facility] [place]  [address], [place] [address], [address]  [facility], [address]                             [address] [place] [address]  [phone]

## 2019-10-21 NOTE — PROGRESS NOTES
Echo 10/17/2019 LVEF 55% visually and biplane measurement 50%  Mild MR  Moderate LAE    I have discussed with him in office

## 2019-11-19 ENCOUNTER — HOSPITAL ENCOUNTER (OUTPATIENT)
Dept: CT IMAGING | Age: 79
Discharge: HOME | End: 2019-11-19
Attending: INTERNAL MEDICINE
Payer: MEDICARE

## 2019-11-19 DIAGNOSIS — I48.0 PAF (PAROXYSMAL ATRIAL FIBRILLATION) (HCC): ICD-10-CM

## 2019-11-19 DIAGNOSIS — I10 ESSENTIAL HYPERTENSION: ICD-10-CM

## 2019-11-19 DIAGNOSIS — Z01.812 PRE-PROCEDURE LAB EXAM: ICD-10-CM

## 2019-11-19 DIAGNOSIS — Z98.890 S/P ABLATION OF ATRIAL FIBRILLATION: ICD-10-CM

## 2019-11-19 DIAGNOSIS — I48.19 PERSISTENT ATRIAL FIBRILLATION (HCC): ICD-10-CM

## 2019-11-19 DIAGNOSIS — I34.0 MITRAL VALVE INSUFFICIENCY, UNSPECIFIED ETIOLOGY: ICD-10-CM

## 2019-11-19 DIAGNOSIS — Z86.79 S/P ABLATION OF ATRIAL FIBRILLATION: ICD-10-CM

## 2019-11-19 DIAGNOSIS — I48.91 ATRIAL FIBRILLATION WITH RAPID VENTRICULAR RESPONSE (HCC): ICD-10-CM

## 2019-11-19 LAB — CREAT BLD-MCNC: 1.5 MG/DL (ref 0.6–1.3)

## 2019-11-19 PROCEDURE — 71275 CT ANGIOGRAPHY CHEST: CPT

## 2019-11-19 PROCEDURE — 74011636320 HC RX REV CODE- 636/320: Performed by: RADIOLOGY

## 2019-11-19 PROCEDURE — 82565 ASSAY OF CREATININE: CPT

## 2019-11-19 PROCEDURE — 74011000258 HC RX REV CODE- 258: Performed by: RADIOLOGY

## 2019-11-19 RX ORDER — SODIUM CHLORIDE 0.9 % (FLUSH) 0.9 %
10 SYRINGE (ML) INJECTION
Status: COMPLETED | OUTPATIENT
Start: 2019-11-19 | End: 2019-11-19

## 2019-11-19 RX ADMIN — SODIUM CHLORIDE 100 ML: 900 INJECTION, SOLUTION INTRAVENOUS at 11:49

## 2019-11-19 RX ADMIN — Medication 10 ML: at 11:49

## 2019-11-19 RX ADMIN — IOPAMIDOL 80 ML: 755 INJECTION, SOLUTION INTRAVENOUS at 11:49

## 2019-11-21 NOTE — PROGRESS NOTES
Normal pulmonary vein anatomy  Ok with ablation  Future Appointments  12/20/2019 10:20 AM   KO Gutierrez  1/8/2020   1:15 PM    [facility] [place] CATH LAB 2             [facility]  1/23/2020  1:20 PM    MD Trice Schmidt

## 2019-12-17 ENCOUNTER — HOSPITAL ENCOUNTER (OUTPATIENT)
Dept: LAB | Age: 79
Discharge: HOME | End: 2019-12-17
Payer: MEDICARE

## 2019-12-17 PROCEDURE — 36415 COLL VENOUS BLD VENIPUNCTURE: CPT

## 2019-12-17 PROCEDURE — 85025 COMPLETE CBC W/AUTO DIFF WBC: CPT

## 2019-12-17 PROCEDURE — 80048 BASIC METABOLIC PNL TOTAL CA: CPT

## 2019-12-18 LAB
BASOPHILS # BLD AUTO: 0.1 X10E3/UL (ref 0–0.2)
BASOPHILS NFR BLD AUTO: 1 %
BUN SERPL-MCNC: 16 MG/DL (ref 8–27)
BUN/CREAT SERPL: 10 (ref 10–24)
CALCIUM SERPL-MCNC: 9.6 MG/DL (ref 8.6–10.2)
CHLORIDE SERPL-SCNC: 100 MMOL/L (ref 96–106)
CO2 SERPL-SCNC: 23 MMOL/L (ref 20–29)
CREAT SERPL-MCNC: 1.53 MG/DL (ref 0.76–1.27)
EOSINOPHIL # BLD AUTO: 0.3 X10E3/UL (ref 0–0.4)
EOSINOPHIL NFR BLD AUTO: 5 %
ERYTHROCYTE [DISTWIDTH] IN BLOOD BY AUTOMATED COUNT: 15.8 % (ref 12.3–15.4)
GLUCOSE SERPL-MCNC: 98 MG/DL (ref 65–99)
HCT VFR BLD AUTO: 34.9 % (ref 37.5–51)
HGB BLD-MCNC: 10.6 G/DL (ref 13–17.7)
IMM GRANULOCYTES # BLD AUTO: 0 X10E3/UL (ref 0–0.1)
IMM GRANULOCYTES NFR BLD AUTO: 0 %
INTERPRETATION: NORMAL
LYMPHOCYTES # BLD AUTO: 1.8 X10E3/UL (ref 0.7–3.1)
LYMPHOCYTES NFR BLD AUTO: 26 %
MCH RBC QN AUTO: 20.8 PG (ref 26.6–33)
MCHC RBC AUTO-ENTMCNC: 30.4 G/DL (ref 31.5–35.7)
MCV RBC AUTO: 69 FL (ref 79–97)
MONOCYTES # BLD AUTO: 0.9 X10E3/UL (ref 0.1–0.9)
MONOCYTES NFR BLD AUTO: 13 %
NEUTROPHILS # BLD AUTO: 3.8 X10E3/UL (ref 1.4–7)
NEUTROPHILS NFR BLD AUTO: 55 %
PLATELET # BLD AUTO: 241 X10E3/UL (ref 150–450)
POTASSIUM SERPL-SCNC: 4.8 MMOL/L (ref 3.5–5.2)
RBC # BLD AUTO: 5.09 X10E6/UL (ref 4.14–5.8)
SODIUM SERPL-SCNC: 139 MMOL/L (ref 134–144)
WBC # BLD AUTO: 7 X10E3/UL (ref 3.4–10.8)

## 2019-12-20 ENCOUNTER — OFFICE VISIT (OUTPATIENT)
Dept: CARDIOLOGY CLINIC | Age: 79
End: 2019-12-20

## 2019-12-20 VITALS
WEIGHT: 191 LBS | BODY MASS INDEX: 27.35 KG/M2 | OXYGEN SATURATION: 99 % | HEIGHT: 70 IN | HEART RATE: 91 BPM | SYSTOLIC BLOOD PRESSURE: 134 MMHG | RESPIRATION RATE: 14 BRPM | DIASTOLIC BLOOD PRESSURE: 76 MMHG

## 2019-12-20 DIAGNOSIS — Z98.890 S/P ABLATION OF ATRIAL FIBRILLATION: ICD-10-CM

## 2019-12-20 DIAGNOSIS — I44.0 FIRST DEGREE AV BLOCK: ICD-10-CM

## 2019-12-20 DIAGNOSIS — I49.3 ASYMPTOMATIC PVCS: ICD-10-CM

## 2019-12-20 DIAGNOSIS — I34.0 MODERATE MITRAL REGURGITATION BY PRIOR ECHOCARDIOGRAM: ICD-10-CM

## 2019-12-20 DIAGNOSIS — I10 ESSENTIAL HYPERTENSION: ICD-10-CM

## 2019-12-20 DIAGNOSIS — Z86.79 S/P ABLATION OF ATRIAL FIBRILLATION: ICD-10-CM

## 2019-12-20 DIAGNOSIS — I48.91 ATRIAL FIBRILLATION, UNSPECIFIED TYPE (HCC): Primary | ICD-10-CM

## 2019-12-20 DIAGNOSIS — I45.10 RBBB: ICD-10-CM

## 2019-12-20 NOTE — PROGRESS NOTES
Discussed results with patient in clinic today. Cr 1.53, will repeat. Mildly anemic, Hgb 10.6. OK to proceed with upcoming procedure as scheduled.

## 2019-12-20 NOTE — PROGRESS NOTES
Cardiac Electrophysiology Office Note Subjective:  
  
Hoa Dowd is a 78 y.o. male patient who is seen for H&P update prior to upcoming AF ablation scheduled for 01/08/2020. He was last seen on 10/17/2019 after AF with mild RVR had been noted during echocardiogram.  Diltiazem CD was increased to 240 mg po daily. Echo that day showed LVEF 50-55% with mild MR. He is s/p AF ablation in 05/2018, had no known recurrence until 10/2019. He denies any recent palpitations, SOB, PND, orthopnea, lightheadedness, or syncope. Anticoagulated with Eliquis, denies bleeding issues. Pre ablation chest CT already obtained, has normal pulmonary vein anatomy. Previous: 
Echo (10/17/2019): LVEF 50-55%. Mod LA dilation. Mild MR. Holter (09/20/2018): SB/NSR with 1st degree AVB, RBBB, occasional PVCs. S/p AF ablation 05/23/2018. Post ablation holter showed no AF; PVCs & SB noted with 1st degree AVB, RBBB. MAT (05/23/2018): LVEF 55-60%, no RWMA. LA mod dilated. RA mild to mod dilated. Mod MR. Echo (06/16/2017): LVEF 50-55%, no RWMA. Mod ATILIO. Mod MR. Mod TR. Followed by Dr. Nadia Hernández. Patient Active Problem List  
Diagnosis Code  Acne rosacea L71.9  Hypercholesteremia E78.00  
 HTN (hypertension) I10  [facility] D[facility]  Advance care planning Z71.89  
 Advanced care planning/counseling discussion Z70.80  
 persistent atrial fibrillation I48.91  
 S/P ablation of atrial fibrillation Z98.890, Z86.79  
 Persistent atrial fibrillation I48.19 Current Outpatient Medications Medication Sig Dispense Refill  dilTIAZem CD (CARDIZEM CD) 240 mg ER capsule Take 1 Cap by mouth daily. 30 Cap 5  
 lisinopril (PRINIVIL, ZESTRIL) 20 mg tablet TAKE 1 TABLET BY MOUTH DAILY 90 Tab 1  
 furosemide (LASIX) 20 mg tablet TAKE 1 TABLET BY MOUTH DAILY 30 Tab 5  
 apixaban (ELIQUIS) 5 mg tablet Take 1 Tab by mouth two (2) times a day. 180 Tab 1 Allergies Allergen Reactions  Hydrochlorothiazide Rash  Pcn [Penicillins] Other (comments) MOUTH SORE Past Medical History:  
Diagnosis Date  Acne rosacea 6/1/2010  
 HTN (hypertension) 6/1/2010  Hypercholesteremia 6/1/2010 No past surgical history Family History Problem Relation Age of Onset  Lung Disease Father Social History Tobacco Use  Smoking status: Former Smoker  Smokeless tobacco: Never Used  Tobacco comment: 3671 Substance Use Topics  Alcohol use: Yes Review of Systems:  
Constitutional: Negative for fever, chills, weight loss HEENT: Negative for nosebleeds, vision changes. Respiratory: Negative for cough, hemoptysis, sputum production, and wheezing. Cardiovascular: Negative for chest pain, no orthopnea, claudication, no syncope, and PND.  + chronic stable leg edema, + occasional palpitations Gastrointestinal: Negative for nausea, vomiting, diarrhea, constipation, blood in stool and melena. Genitourinary: Negative for dysuria, and hematuria. Musculoskeletal: Negative for myalgias Skin: Negative for rash. Heme: No bleeding issues. Neurological: Negative for speech change and focal weakness Objective:  
 
Visit Vitals /76 (BP 1 Location: Left arm, BP Patient Position: Sitting) Pulse 91 Resp 14 Ht 5' 10\" (1.778 m) Wt 191 lb (86.6 kg) SpO2 99% BMI 27.41 kg/m² Physical Exam:  
Constitutional: Well-nourished. No distress. Head: Normocephalic and atraumatic. Eyes: Pupils are equal, round . Neck: Supple. No JVD present. Cardiovascular: Regular rate, irregular rhythm. Exam reveals no gallop and no friction rub. 2/6 systolic murmur. Pulmonary/Chest: Effort normal and breath sounds normal. No wheezes. Abdominal: Soft, no tenderness. Musculoskeletal: Trace bilateral lower extremity edema. Neurological: Alert,oriented. Skin: Skin is warm and dry. Psychiatric: Normal mood and affect. Behavior is normal. Judgment and thought content normal.  
  
 
Assessment/Plan: ICD-10-CM ICD-9-CM 1. Atrial fibrillation, unspecified type ([address] Q48.86 547.22 METABOLIC PANEL, BASIC 2. RBBB V34.04 619.9 METABOLIC PANEL, BASIC 3. Essential hypertension C76 270.0 METABOLIC PANEL, BASIC  
4. S/P ablation of atrial fibrillation K86.329 V68.30 METABOLIC PANEL, BASIC  
 T47.85    
5. Asymptomatic PVCs Q14.2 421.34 METABOLIC PANEL, BASIC 6. First degree AV block V82.2 080.25 METABOLIC PANEL, BASIC 7. Moderate mitral regurgitation by prior echocardiogram W32.6 407.5 METABOLIC PANEL, BASIC Mr. Annabella Haines has had recurrent AF, though he initially did well post AF ablation in 05/2018. Echo in 10/2019 while in AF with mild RVR showed LVEF 50-55% with mild MR & moderate LA dilation. MR improved compared to previous. Currently with ventricular rate controlled on diltiazem. He will hold Eliquis x 2 days prior to procedure. Pre procedure chest CT already obtained, showed normal pulmonary vein anatomy. Pre procedure labs already obtained. Mildly anemic & Cr 1.53, slightly higher than baseline. Will recheck BMP. If still elevated, would consider changing lisinopril. Risks/benefits of repeat ablation of AF reviewed, & he agrees to proceed as scheduled. Future Appointments Date Time Provider Sancho Harmon 1/8/2020  1:15 PM [facility] CATH LAB 2 [facility]  
1/23/2020  1:20 PM Jody Martinez  [address] Thank you for involving me in this patient's care and please call with further concerns or questions. KAILEE Aleman 9 [facility] 12/20/19

## 2019-12-20 NOTE — PROGRESS NOTES
Cardiac Electrophysiology OFFICE Note     Subjective:      Jun Burns is a 78 y.o. patient who is seen for H&P update prior to upcoming AF ablation scheduled for 01/08/2020. He was last seen on 10/17/2019 after AF with mild RVR had been noted during echocardiogram.  Diltiazem CD was increased to 240 mg po daily. Echo that day showed LVEF 50-55% with mild MR. He is s/p AF ablation in 05/2018, had no known recurrence until 10/2019. He denies any recent palpitations, SOB, PND, orthopnea, lightheadedness, or syncope. Anticoagulated with Eliquis, denies bleeding issues. Pre ablation chest CT already obtained, has normal pulmonary vein anatomy. Previous:  Echo (10/17/2019): LVEF 50-55%. Mod LA dilation. Mild MR. Holter (09/20/2018): SB/NSR with 1st degree AVB, RBBB, occasional PVCs. S/p AF ablation 05/23/2018. Post ablation holter showed no AF; PVCs & SB noted with 1st degree AVB, RBBB. MAT (05/23/2018): LVEF 55-60%, no RWMA. LA mod dilated. RA mild to mod dilated. Mod MR. Echo (06/16/2017): LVEF 50-55%, no RWMA. Mod ATILIO. Mod MR. Mod TR. Followed by Dr. Davidson Hinojosa.        Problem List  Date Reviewed: 12/20/2019          Codes Class Noted    persistent atrial fibrillation ICD-10-CM: I48.91  ICD-9-CM: 427.31  5/23/2018        S/P ablation of atrial fibrillation ICD-10-CM: Z98.890, Z86.79  ICD-9-CM: V45.89  5/23/2018    Overview Signed 5/23/2018  5:26 PM by Rafael Chowdary MD     5/23/2018             Persistent atrial fibrillation ICD-10-CM: I48.19  ICD-9-CM: 427.31  5/23/2018        Advanced care planning/counseling discussion ICD-10-CM: Z71.89  ICD-9-CM: V65.49  4/18/2017    Overview Signed 4/18/2017  3:51 PM by Herbie Hedrick MD     Asked pt to bring in             Thrombocytopenia [facility]) ICD-10-CM: D69.6  ICD-9-CM: 287.5  3/8/2016        Advance care planning ICD-10-CM: Z71.89  ICD-9-CM: V65.49  3/8/2016    Overview Signed 3/8/2016  9:04 AM by Herbie Hedrick MD Pt has one             Acne rosacea ICD-10-CM: L71.9  ICD-9-CM: 695.3  6/1/2010        Hypercholesteremia ICD-10-CM: E78.00  ICD-9-CM: 272.0  6/1/2010        HTN (hypertension) ICD-10-CM: I10  ICD-9-CM: 401.9  6/1/2010              Current Outpatient Medications   Medication Sig Dispense Refill    dilTIAZem CD (CARDIZEM CD) 240 mg ER capsule Take 1 Cap by mouth daily. 30 Cap 5    lisinopril (PRINIVIL, ZESTRIL) 20 mg tablet TAKE 1 TABLET BY MOUTH DAILY 90 Tab 1    furosemide (LASIX) 20 mg tablet TAKE 1 TABLET BY MOUTH DAILY 30 Tab 5    apixaban (ELIQUIS) 5 mg tablet Take 1 Tab by mouth two (2) times a day. 180 Tab 1     Allergies   Allergen Reactions    Hydrochlorothiazide Rash    Pcn [Penicillins] Other (comments)     MOUTH SORE     Past Medical History:   Diagnosis Date    Acne rosacea 6/1/2010    HTN (hypertension) 6/1/2010    Hypercholesteremia 6/1/2010     History reviewed. No pertinent surgical history. Family History   Problem Relation Age of Onset    Lung Disease Father      Social History     Tobacco Use    Smoking status: Former Smoker    Smokeless tobacco: Never Used    Tobacco comment: 1968   Substance Use Topics    Alcohol use: Yes        Review of Systems:   Constitutional: Negative for fever, chills, weight loss, + malaise/fatigue. HEENT: Negative for nosebleeds, vision changes. Respiratory: Negative for cough, hemoptysis  Cardiovascular: Negative for chest pain, palpitations, orthopnea, claudication, leg swelling, syncope, and PND. + chronic stable leg edema  Gastrointestinal: Negative for nausea, vomiting, diarrhea, blood in stool and melena. Genitourinary: Negative for dysuria, and hematuria. Musculoskeletal: Negative for myalgias, arthralgia. Skin: Negative for rash. Heme: Does not bleed or bruise easily.    Neurological: Negative for speech change and focal weakness     Objective:     Visit Vitals  /76 (BP 1 Location: Left arm, BP Patient Position: Sitting)   Pulse 91   Resp 14   Ht 5' 10\" (1.778 m)   Wt 191 lb (86.6 kg)   SpO2 99%   BMI 27.41 kg/m²      Physical Exam:   Constitutional: Well-developed and well-nourished. No respiratory distress. Head: Normocephalic and atraumatic. Eyes: Pupils are equal, round  ENT: Hearing normal  Neck: Supple. No JVD present. Cardiovascular: Normal rate, irregular rhythm. Exam reveals no gallop and no friction rub. 2/6 systolic murmur. Pulmonary/Chest: Effort normal and breath sounds normal. No wheezes. Abdominal: Soft, no tenderness. Musculoskeletal: 1+ bilat lower extremity edema. Neurological: Alert,oriented. Skin: Skin is warm and dry  Psychiatric: Normal mood and affect. Behavior is normal. Judgment and thought content normal.        Assessment/Plan:       ICD-10-CM ICD-9-CM    1. Atrial fibrillation, unspecified type ([facility] [address] Y42.17 594.89 METABOLIC PANEL, BASIC   2. RBBB D31.62 774.1 METABOLIC PANEL, BASIC   3. Essential hypertension F09 972.5 METABOLIC PANEL, BASIC   4. S/P ablation of atrial fibrillation F91.236 B90.74 METABOLIC PANEL, BASIC    A33.07     5. Asymptomatic PVCs I05.4 559.93 METABOLIC PANEL, BASIC   6. First degree AV block P26.6 834.90 METABOLIC PANEL, BASIC   7. Moderate mitral regurgitation by prior echocardiogram C31.5 369.1 METABOLIC PANEL, BASIC       Mr. Garcia has had recurrent AF, though he initially did well post AF ablation in 05/2018.       Echo in 10/2019 while in AF with mild RVR showed LVEF 50-55% with mild MR & moderate LA dilation. MR improved compared to previous.     Currently with ventricular rate controlled on diltiazem.     He will hold Eliquis x 2 days prior to procedure.     Pre procedure chest CT already obtained, showed normal pulmonary vein anatomy. Pre procedure labs already obtained. Mildly anemic & Cr 1.53, slightly higher than baseline. Will recheck BMP.   If still elevated, would consider changing lisinopril.     Risks/benefits of repeat ablation of AF reviewed, & he agrees to proceed as scheduled. Future Appointments   Date Time Provider Sancho Harmon   1/8/2020  1:15 PM [facility] CATH LAB 2 [facility]   1/23/2020  1:20 PM Iglesia Villanueva  [address]       Thank you for involving me in this patient's care and please call with further concerns or questions.     RONNIE West-ELLA Mcgill  [facility] [place]  12/20/19

## 2019-12-20 NOTE — PROGRESS NOTES
Cardiac Electrophysiology Office Note Subjective:  
  
Israel Jett is a 78 y.o. male patient who is seen for H&P update prior to upcoming AF ablation scheduled for 01/08/2020. He was last seen on 10/17/2019 after AF with mild RVR had been noted during echocardiogram.  Diltiazem CD was increased to 240 mg po daily. Echo that day showed LVEF 50-55% with mild MR. He is s/p AF ablation in 05/2018, had no known recurrence until 10/2019. He denies any recent palpitations, SOB, PND, orthopnea, lightheadedness, or syncope. Anticoagulated with Eliquis, denies bleeding issues. Pre ablation chest CT already obtained, has normal pulmonary vein anatomy. Previous: 
Echo (10/17/2019): LVEF 50-55%. Mod LA dilation. Mild MR. Holter (09/20/2018): SB/NSR with 1st degree AVB, RBBB, occasional PVCs. S/p AF ablation 05/23/2018. Post ablation holter showed no AF; PVCs & SB noted with 1st degree AVB, RBBB. MAT (05/23/2018): LVEF 55-60%, no RWMA. LA mod dilated. RA mild to mod dilated. Mod MR. Echo (06/16/2017): LVEF 50-55%, no RWMA. Mod ATILIO. Mod MR. Mod TR. Followed by Dr. Shirley Barakat. Patient Active Problem List  
Diagnosis Code  Acne rosacea L71.9  Hypercholesteremia E78.00  
 HTN (hypertension) I10  Thrombocytopenia ([facility] [address] D69.6  Advance care planning Z71.89  
 Advanced care planning/counseling discussion Z70.80  
 persistent atrial fibrillation I48.91  
 S/P ablation of atrial fibrillation Z98.890, Z86.79  
 Persistent atrial fibrillation I48.19 Current Outpatient Medications Medication Sig Dispense Refill  dilTIAZem CD (CARDIZEM CD) 240 mg ER capsule Take 1 Cap by mouth daily. 30 Cap 5  
 lisinopril (PRINIVIL, ZESTRIL) 20 mg tablet TAKE 1 TABLET BY MOUTH DAILY 90 Tab 1  
 furosemide (LASIX) 20 mg tablet TAKE 1 TABLET BY MOUTH DAILY 30 Tab 5  
 apixaban (ELIQUIS) 5 mg tablet Take 1 Tab by mouth two (2) times a day.  180 Tab 1  
  furosemide (LASIX) 20 mg tablet Take 1 Tab by mouth daily. 30 Tab 5 Allergies Allergen Reactions  Hydrochlorothiazide Rash  Pcn [Penicillins] Other (comments) MOUTH SORE Past Medical History:  
Diagnosis Date  Acne rosacea 6/1/2010  
 HTN (hypertension) 6/1/2010  Hypercholesteremia 6/1/2010 No past surgical history Family History Problem Relation Age of Onset  Lung Disease Father Social History Tobacco Use  Smoking status: Former Smoker  Smokeless tobacco: Never Used  Tobacco comment: 9373 Substance Use Topics  Alcohol use: Yes Review of Systems:  
Constitutional: Negative for fever, chills, weight loss HEENT: Negative for nosebleeds, vision changes. Respiratory: Negative for cough, hemoptysis, sputum production, and wheezing. Cardiovascular: Negative for chest pain, no orthopnea, claudication, no syncope, and PND.  + chronic stable leg edema Gastrointestinal: Negative for nausea, vomiting, diarrhea, constipation, blood in stool and melena. Genitourinary: Negative for dysuria, and hematuria. Musculoskeletal: Negative for myalgias Skin: Negative for rash. Heme: Does not bleed Neurological: Negative for speech change and focal weakness Objective:  mmHg  bpm  
Physical Exam:  
Constitutional: Well-nourished. No distress. Head: Normocephalic and atraumatic. Eyes: Pupils are equal, round . Neck: Supple. No JVD present. Cardiovascular: fast rate, irregular rhythm. Exam reveals no gallop and no friction rub. 2/6 Systolic murmur. Pulmonary/Chest: Effort normal and breath sounds normal. No wheezes. Abdominal: Soft, no tenderness. Musculoskeletal: 1+ bilateral lower extremity edema. Neurological: Alert,oriented. Skin: Skin is warm and dry. Psychiatric: Normal mood and affect. Behavior is normal. Judgment and thought content normal.  
  
 
Assessment/Plan:  
 
{No Diagnosis Found} He will hold Eliquis x 2 days prior to procedure. Mr. Garcia has recurrent AFIB and probably PAF He had persistent AFIB ablation May 2018 and has done well His MR is improved LVEF is normal 
He improved with NSR so after discussion with him he wants to have another AFIB ablation to maintain NSR For now until ablation his V rate needs control more and I recommend with mildly elevated BP he increase cardizem to 240 mg every day He will hold eliquis 2 days before the ablation He will return to see Kendall Mccabe NP for follow up before ablation and he can also discuss further questions or concerns since his procedure is in December 2019 Future Appointments Date Time Provider Sancho Harmon 12/20/2019 10:20 AM Car Salamanca  [address]  
1/8/2020  1:15 PM [facility] CATH LAB 2 [facility]  
1/23/2020  1:20 PM Jody Martinez  [address] Thank you for involving me in this patient's care and please call with further concerns or questions. Stone Sierra M.D. Electrophysiology/Cardiology [address] [facility] and [facility], [facility] [address] [place] [address] 
[phone]

## 2019-12-23 RX ORDER — FUROSEMIDE 20 MG/1
20 TABLET ORAL DAILY
Qty: 30 TAB | Refills: 5 | Status: SHIPPED | OUTPATIENT
Start: 2019-12-23 | End: 2020-06-17

## 2019-12-23 NOTE — TELEPHONE ENCOUNTER
Request for Lasix 20 mg daily. Last office visit 12/20/19, next office visit 1/23/20. Refills per verbal order from Dr. Bisi Harper.

## 2019-12-30 ENCOUNTER — HOSPITAL ENCOUNTER (OUTPATIENT)
Dept: LAB | Age: 79
Discharge: HOME | End: 2019-12-30
Payer: MEDICARE

## 2019-12-30 PROCEDURE — 80048 BASIC METABOLIC PNL TOTAL CA: CPT

## 2019-12-30 PROCEDURE — 36415 COLL VENOUS BLD VENIPUNCTURE: CPT

## 2019-12-30 RX ORDER — FUROSEMIDE 20 MG/1
TABLET ORAL
Qty: 30 TAB | Refills: 5 | OUTPATIENT
Start: 2019-12-30

## 2019-12-30 NOTE — TELEPHONE ENCOUNTER
Cardiologist: Dr. Sophie Ortiz    Last appt: 12/20/2019  Future Appointments   Date Time Provider Sancho Harmon   1/8/2020  1:15 PM [facility] CATH LAB 2 [facility]   1/23/2020  1:20 PM Iglesia Villanueva  [address]       Requested Prescriptions     Refused Prescriptions Disp Refills    furosemide (LASIX) 20 mg tablet [Pharmacy Med Name: FUROSEMIDE 20MG TABLETS] 30 Tab 5     Sig: TAKE 1 TABLET BY MOUTH DAILY     Refused By: Meliza Alicea     Reason for Refusal: Patient has requested refill too soon         Refills VO per Elbert Marcus NP.

## 2019-12-31 LAB
BUN SERPL-MCNC: 16 MG/DL (ref 8–27)
BUN/CREAT SERPL: 11 (ref 10–24)
CALCIUM SERPL-MCNC: 9.4 MG/DL (ref 8.6–10.2)
CHLORIDE SERPL-SCNC: 103 MMOL/L (ref 96–106)
CO2 SERPL-SCNC: 22 MMOL/L (ref 20–29)
CREAT SERPL-MCNC: 1.5 MG/DL (ref 0.76–1.27)
GLUCOSE SERPL-MCNC: 93 MG/DL (ref 65–99)
INTERPRETATION: NORMAL
POTASSIUM SERPL-SCNC: 4.5 MMOL/L (ref 3.5–5.2)
SODIUM SERPL-SCNC: 138 MMOL/L (ref 134–144)

## 2020-01-02 NOTE — PROGRESS NOTES
Cr improved slightly compared to previous. No med changes yet. Still ok to proceed with upcoming procedure.

## 2020-01-03 RX ORDER — SODIUM CHLORIDE 0.9 % (FLUSH) 0.9 %
5-40 SYRINGE (ML) INJECTION AS NEEDED
Status: CANCELLED | OUTPATIENT
Start: 2020-01-03

## 2020-01-03 RX ORDER — SODIUM CHLORIDE 0.9 % (FLUSH) 0.9 %
5-40 SYRINGE (ML) INJECTION EVERY 8 HOURS
Status: CANCELLED | OUTPATIENT
Start: 2020-01-03

## 2020-01-08 ENCOUNTER — ANESTHESIA (OUTPATIENT)
Dept: CARDIAC CATH/INVASIVE PROCEDURES | Age: 80
End: 2020-01-08
Payer: MEDICARE

## 2020-01-08 ENCOUNTER — APPOINTMENT (OUTPATIENT)
Dept: CARDIAC CATH/INVASIVE PROCEDURES | Age: 80
End: 2020-01-08
Attending: INTERNAL MEDICINE
Payer: MEDICARE

## 2020-01-08 ENCOUNTER — ANESTHESIA EVENT (OUTPATIENT)
Dept: CARDIAC CATH/INVASIVE PROCEDURES | Age: 80
End: 2020-01-08
Payer: MEDICARE

## 2020-01-08 ENCOUNTER — HOSPITAL ENCOUNTER (OUTPATIENT)
Age: 80
Discharge: HOME | End: 2020-01-09
Attending: INTERNAL MEDICINE | Admitting: INTERNAL MEDICINE
Payer: MEDICARE

## 2020-01-08 DIAGNOSIS — I48.0 PAROXYSMAL ATRIAL FIBRILLATION (HCC): ICD-10-CM

## 2020-01-08 PROBLEM — I48.3 TYPICAL ATRIAL FLUTTER (HCC): Status: ACTIVE | Noted: 2020-01-08

## 2020-01-08 PROBLEM — I48.91 ATRIAL FIBRILLATION (HCC): Status: ACTIVE | Noted: 2020-01-08

## 2020-01-08 PROBLEM — Z98.890 STATUS POST CATHETER ABLATION OF ATRIAL FLUTTER: Status: ACTIVE | Noted: 2020-01-08

## 2020-01-08 LAB
ABO + RH BLD: NORMAL
BLOOD GROUP ANTIBODIES SERPL: NORMAL
SPECIMEN EXP DATE BLD: NORMAL

## 2020-01-08 PROCEDURE — 74011250636 HC RX REV CODE- 250/636: Performed by: NURSE ANESTHETIST, CERTIFIED REGISTERED

## 2020-01-08 PROCEDURE — 77030008684 HC TU ET CUF COVD -B: Performed by: ANESTHESIOLOGY

## 2020-01-08 PROCEDURE — 74011000250 HC RX REV CODE- 250: Performed by: NURSE ANESTHETIST, CERTIFIED REGISTERED

## 2020-01-08 PROCEDURE — 36415 COLL VENOUS BLD VENIPUNCTURE: CPT

## 2020-01-08 PROCEDURE — C1731 CATH, EP, 20 OR MORE ELEC: HCPCS | Performed by: INTERNAL MEDICINE

## 2020-01-08 PROCEDURE — P9045 ALBUMIN (HUMAN), 5%, 250 ML: HCPCS | Performed by: NURSE ANESTHETIST, CERTIFIED REGISTERED

## 2020-01-08 PROCEDURE — C1766 INTRO/SHEATH,STRBLE,NON-PEEL: HCPCS | Performed by: INTERNAL MEDICINE

## 2020-01-08 PROCEDURE — 85347 COAGULATION TIME ACTIVATED: CPT

## 2020-01-08 PROCEDURE — 77030039046 HC PAD DEFIB RADIOTRNSPNT CNMD -B: Performed by: INTERNAL MEDICINE

## 2020-01-08 PROCEDURE — 77030020506 HC NDL TRNSPTL NRG BAYL -F: Performed by: INTERNAL MEDICINE

## 2020-01-08 PROCEDURE — C1893 INTRO/SHEATH, FIXED,NON-PEEL: HCPCS | Performed by: INTERNAL MEDICINE

## 2020-01-08 PROCEDURE — C1894 INTRO/SHEATH, NON-LASER: HCPCS | Performed by: INTERNAL MEDICINE

## 2020-01-08 PROCEDURE — 99218 HC RM OBSERVATION: CPT

## 2020-01-08 PROCEDURE — 93657 TX L/R ATRIAL FIB ADDL: CPT | Performed by: INTERNAL MEDICINE

## 2020-01-08 PROCEDURE — 86900 BLOOD TYPING SEROLOGIC ABO: CPT

## 2020-01-08 PROCEDURE — C2630 CATH, EP, COOL-TIP: HCPCS | Performed by: INTERNAL MEDICINE

## 2020-01-08 PROCEDURE — 77030018733 HC ELECTRD KT ENSITE STJU -G: Performed by: INTERNAL MEDICINE

## 2020-01-08 PROCEDURE — 77030013797 HC KT TRNSDUC PRSSR EDWD -A: Performed by: INTERNAL MEDICINE

## 2020-01-08 PROCEDURE — 77030026438 HC STYL ET INTUB CARD -A: Performed by: ANESTHESIOLOGY

## 2020-01-08 PROCEDURE — 77030033352 HC TBNG IRR PMP COOL PNT STJU -B: Performed by: INTERNAL MEDICINE

## 2020-01-08 PROCEDURE — 77030016898 HC CBL EP EXT3 STJU -B: Performed by: INTERNAL MEDICINE

## 2020-01-08 PROCEDURE — 93656 COMPRE EP EVAL ABLTJ ATR FIB: CPT | Performed by: INTERNAL MEDICINE

## 2020-01-08 PROCEDURE — 93662 INTRACARDIAC ECG (ICE): CPT | Performed by: INTERNAL MEDICINE

## 2020-01-08 PROCEDURE — C1730 CATH, EP, 19 OR FEW ELECT: HCPCS | Performed by: INTERNAL MEDICINE

## 2020-01-08 PROCEDURE — C1759 CATH, INTRA ECHOCARDIOGRAPHY: HCPCS | Performed by: INTERNAL MEDICINE

## 2020-01-08 PROCEDURE — 93613 INTRACARDIAC EPHYS 3D MAPG: CPT | Performed by: INTERNAL MEDICINE

## 2020-01-08 PROCEDURE — 76060000037 HC ANESTHESIA 3 TO 3.5 HR: Performed by: INTERNAL MEDICINE

## 2020-01-08 PROCEDURE — 77030010880 HC CBL EP SUPRME STJU -C: Performed by: INTERNAL MEDICINE

## 2020-01-08 PROCEDURE — 77030029359 HC PRB ESOPH TEMP CATH ANTM -F: Performed by: INTERNAL MEDICINE

## 2020-01-08 PROCEDURE — 77030016899 HC CBL EP EXT4 BSC -B: Performed by: INTERNAL MEDICINE

## 2020-01-08 RX ORDER — LISINOPRIL 20 MG/1
20 TABLET ORAL DAILY
Status: DISCONTINUED | OUTPATIENT
Start: 2020-01-09 | End: 2020-01-09 | Stop reason: HOSPADM

## 2020-01-08 RX ORDER — HEPARIN SODIUM 10000 [USP'U]/100ML
INJECTION, SOLUTION INTRAVENOUS
Status: DISCONTINUED | OUTPATIENT
Start: 2020-01-08 | End: 2020-01-08 | Stop reason: HOSPADM

## 2020-01-08 RX ORDER — FUROSEMIDE 20 MG/1
20 TABLET ORAL DAILY
Status: DISCONTINUED | OUTPATIENT
Start: 2020-01-09 | End: 2020-01-09 | Stop reason: HOSPADM

## 2020-01-08 RX ORDER — HYDROCODONE BITARTRATE AND ACETAMINOPHEN 5; 325 MG/1; MG/1
1 TABLET ORAL
Status: DISCONTINUED | OUTPATIENT
Start: 2020-01-08 | End: 2020-01-09 | Stop reason: HOSPADM

## 2020-01-08 RX ORDER — HEPARIN SODIUM 1000 [USP'U]/ML
INJECTION, SOLUTION INTRAVENOUS; SUBCUTANEOUS AS NEEDED
Status: DISCONTINUED | OUTPATIENT
Start: 2020-01-08 | End: 2020-01-08 | Stop reason: HOSPADM

## 2020-01-08 RX ORDER — SODIUM CHLORIDE 0.9 % (FLUSH) 0.9 %
5-40 SYRINGE (ML) INJECTION AS NEEDED
Status: DISCONTINUED | OUTPATIENT
Start: 2020-01-08 | End: 2020-01-09 | Stop reason: HOSPADM

## 2020-01-08 RX ORDER — ACETAMINOPHEN 325 MG/1
650 TABLET ORAL
Status: DISCONTINUED | OUTPATIENT
Start: 2020-01-08 | End: 2020-01-09 | Stop reason: HOSPADM

## 2020-01-08 RX ORDER — PROPOFOL 10 MG/ML
INJECTION, EMULSION INTRAVENOUS AS NEEDED
Status: DISCONTINUED | OUTPATIENT
Start: 2020-01-08 | End: 2020-01-08 | Stop reason: HOSPADM

## 2020-01-08 RX ORDER — ZOLPIDEM TARTRATE 5 MG/1
5 TABLET ORAL
Status: DISCONTINUED | OUTPATIENT
Start: 2020-01-08 | End: 2020-01-09 | Stop reason: HOSPADM

## 2020-01-08 RX ORDER — LIDOCAINE HYDROCHLORIDE 20 MG/ML
INJECTION, SOLUTION EPIDURAL; INFILTRATION; INTRACAUDAL; PERINEURAL AS NEEDED
Status: DISCONTINUED | OUTPATIENT
Start: 2020-01-08 | End: 2020-01-08 | Stop reason: HOSPADM

## 2020-01-08 RX ORDER — ROCURONIUM BROMIDE 10 MG/ML
INJECTION, SOLUTION INTRAVENOUS AS NEEDED
Status: DISCONTINUED | OUTPATIENT
Start: 2020-01-08 | End: 2020-01-08 | Stop reason: HOSPADM

## 2020-01-08 RX ORDER — ALBUMIN HUMAN 50 G/1000ML
SOLUTION INTRAVENOUS AS NEEDED
Status: DISCONTINUED | OUTPATIENT
Start: 2020-01-08 | End: 2020-01-08 | Stop reason: HOSPADM

## 2020-01-08 RX ORDER — ONDANSETRON 2 MG/ML
INJECTION INTRAMUSCULAR; INTRAVENOUS AS NEEDED
Status: DISCONTINUED | OUTPATIENT
Start: 2020-01-08 | End: 2020-01-08 | Stop reason: HOSPADM

## 2020-01-08 RX ORDER — SODIUM CHLORIDE 0.9 % (FLUSH) 0.9 %
5-40 SYRINGE (ML) INJECTION EVERY 8 HOURS
Status: DISCONTINUED | OUTPATIENT
Start: 2020-01-08 | End: 2020-01-09 | Stop reason: HOSPADM

## 2020-01-08 RX ORDER — SODIUM CHLORIDE 0.9 % (FLUSH) 0.9 %
5-40 SYRINGE (ML) INJECTION AS NEEDED
Status: DISCONTINUED | OUTPATIENT
Start: 2020-01-08 | End: 2020-01-08

## 2020-01-08 RX ORDER — SODIUM CHLORIDE 9 MG/ML
INJECTION, SOLUTION INTRAVENOUS
Status: DISCONTINUED | OUTPATIENT
Start: 2020-01-08 | End: 2020-01-08 | Stop reason: HOSPADM

## 2020-01-08 RX ORDER — SUCCINYLCHOLINE CHLORIDE 20 MG/ML
INJECTION INTRAMUSCULAR; INTRAVENOUS AS NEEDED
Status: DISCONTINUED | OUTPATIENT
Start: 2020-01-08 | End: 2020-01-08 | Stop reason: HOSPADM

## 2020-01-08 RX ORDER — NALOXONE HYDROCHLORIDE 0.4 MG/ML
0.4 INJECTION, SOLUTION INTRAMUSCULAR; INTRAVENOUS; SUBCUTANEOUS AS NEEDED
Status: DISCONTINUED | OUTPATIENT
Start: 2020-01-08 | End: 2020-01-09 | Stop reason: HOSPADM

## 2020-01-08 RX ORDER — PROTAMINE SULFATE 10 MG/ML
INJECTION, SOLUTION INTRAVENOUS AS NEEDED
Status: DISCONTINUED | OUTPATIENT
Start: 2020-01-08 | End: 2020-01-08 | Stop reason: HOSPADM

## 2020-01-08 RX ORDER — DEXAMETHASONE SODIUM PHOSPHATE 4 MG/ML
INJECTION, SOLUTION INTRA-ARTICULAR; INTRALESIONAL; INTRAMUSCULAR; INTRAVENOUS; SOFT TISSUE AS NEEDED
Status: DISCONTINUED | OUTPATIENT
Start: 2020-01-08 | End: 2020-01-08 | Stop reason: HOSPADM

## 2020-01-08 RX ORDER — NEOSTIGMINE METHYLSULFATE 1 MG/ML
INJECTION INTRAVENOUS AS NEEDED
Status: DISCONTINUED | OUTPATIENT
Start: 2020-01-08 | End: 2020-01-08 | Stop reason: HOSPADM

## 2020-01-08 RX ORDER — GLYCOPYRROLATE 0.2 MG/ML
INJECTION INTRAMUSCULAR; INTRAVENOUS AS NEEDED
Status: DISCONTINUED | OUTPATIENT
Start: 2020-01-08 | End: 2020-01-08 | Stop reason: HOSPADM

## 2020-01-08 RX ORDER — PHENYLEPHRINE HCL IN 0.9% NACL 0.4MG/10ML
SYRINGE (ML) INTRAVENOUS AS NEEDED
Status: DISCONTINUED | OUTPATIENT
Start: 2020-01-08 | End: 2020-01-08 | Stop reason: HOSPADM

## 2020-01-08 RX ADMIN — SODIUM CHLORIDE: 900 INJECTION, SOLUTION INTRAVENOUS at 13:45

## 2020-01-08 RX ADMIN — SODIUM CHLORIDE: 900 INJECTION, SOLUTION INTRAVENOUS at 12:15

## 2020-01-08 RX ADMIN — ONDANSETRON HYDROCHLORIDE 4 MG: 2 INJECTION, SOLUTION INTRAMUSCULAR; INTRAVENOUS at 16:18

## 2020-01-08 RX ADMIN — GLYCOPYRROLATE 0.4 MG: 0.2 INJECTION INTRAMUSCULAR; INTRAVENOUS at 16:20

## 2020-01-08 RX ADMIN — PROPOFOL 130 MG: 10 INJECTION, EMULSION INTRAVENOUS at 13:55

## 2020-01-08 RX ADMIN — DEXAMETHASONE SODIUM PHOSPHATE 4 MG: 4 INJECTION, SOLUTION INTRAMUSCULAR; INTRAVENOUS at 14:12

## 2020-01-08 RX ADMIN — PROTAMINE SULFATE 10 MG: 10 INJECTION, SOLUTION INTRAVENOUS at 16:09

## 2020-01-08 RX ADMIN — PHENYLEPHRINE HYDROCHLORIDE 40 MCG/MIN: 10 INJECTION INTRAVENOUS at 14:09

## 2020-01-08 RX ADMIN — Medication 80 MCG: at 14:26

## 2020-01-08 RX ADMIN — Medication 80 MCG: at 15:22

## 2020-01-08 RX ADMIN — Medication 80 MCG: at 15:21

## 2020-01-08 RX ADMIN — PROTAMINE SULFATE 40 MG: 10 INJECTION, SOLUTION INTRAVENOUS at 16:11

## 2020-01-08 RX ADMIN — ROCURONIUM BROMIDE 20 MG: 10 INJECTION INTRAVENOUS at 14:02

## 2020-01-08 RX ADMIN — ROCURONIUM BROMIDE 10 MG: 10 INJECTION INTRAVENOUS at 13:55

## 2020-01-08 RX ADMIN — LIDOCAINE HYDROCHLORIDE 60 MG: 20 INJECTION, SOLUTION EPIDURAL; INFILTRATION; INTRACAUDAL; PERINEURAL at 13:55

## 2020-01-08 RX ADMIN — ROCURONIUM BROMIDE 10 MG: 10 INJECTION INTRAVENOUS at 14:30

## 2020-01-08 RX ADMIN — Medication 120 MCG: at 14:14

## 2020-01-08 RX ADMIN — HEPARIN SODIUM 18 UNITS/KG/HR: 10000 INJECTION, SOLUTION INTRAVENOUS at 14:40

## 2020-01-08 RX ADMIN — ROCURONIUM BROMIDE 10 MG: 10 INJECTION INTRAVENOUS at 15:01

## 2020-01-08 RX ADMIN — HEPARIN SODIUM 3000 UNITS: 1000 INJECTION, SOLUTION INTRAVENOUS; SUBCUTANEOUS at 15:34

## 2020-01-08 RX ADMIN — ALBUMIN (HUMAN) 250 ML: 12.5 INJECTION, SOLUTION INTRAVENOUS at 14:41

## 2020-01-08 RX ADMIN — ROCURONIUM BROMIDE 10 MG: 10 INJECTION INTRAVENOUS at 15:41

## 2020-01-08 RX ADMIN — Medication 80 MCG: at 14:27

## 2020-01-08 RX ADMIN — HEPARIN SODIUM 7000 UNITS: 1000 INJECTION, SOLUTION INTRAVENOUS; SUBCUTANEOUS at 14:38

## 2020-01-08 RX ADMIN — Medication 120 MCG: at 14:43

## 2020-01-08 RX ADMIN — HEPARIN SODIUM 7000 UNITS: 1000 INJECTION, SOLUTION INTRAVENOUS; SUBCUTANEOUS at 15:05

## 2020-01-08 RX ADMIN — NEOSTIGMINE METHYLSULFATE 3 MG: 1 INJECTION, SOLUTION INTRAVENOUS at 16:20

## 2020-01-08 RX ADMIN — Medication 80 MCG: at 14:38

## 2020-01-08 RX ADMIN — SUCCINYLCHOLINE CHLORIDE 140 MG: 20 INJECTION, SOLUTION INTRAMUSCULAR; INTRAVENOUS at 13:55

## 2020-01-08 NOTE — PROGRESS NOTES
Cardiac Cath Lab Recovery Arrival Note:      Ceasar Nugent arrived to Cardiac Cath Lab, Recovery Area. Staff introduced to patient. Patient identifiers verified with NAME and DATE OF BIRTH. Procedure verified with patient. Consent forms reviewed and signed by patient or authorized representative and verified. Allergies verified. Patient and family oriented to department. Patient and family informed of procedure and plan of care. Questions answered with review. Patient prepped for procedure, per orders from physician, prior to arrival.    Patient on cardiac monitor, non-invasive blood pressure, SPO2 monitor. On room air. Patient is A&Ox 4. Patient reports no complaints. Patient in stretcher, in low position, with side rails up, call bell within reach, patient instructed to call if assistance as needed. Patient prep in: [address], [place]. Family in: Molly Mullen (wife) [phone].    Prep by: Gianna Ramirez RN

## 2020-01-08 NOTE — H&P
Cardiac Electrophysiology H/P Note     Subjective:      Chely Stephen is a 78 y.o. patient who is seen for ablation of PAF  He has had recurrence and cardizem was increased for control       He is s/p ablation of atrial fibrillation on 05/23/2018     His atria still mild to moderately large but MR is mild  LVEF normal   On echo monitor his AFIB is back and  bpm      In the past:     Post ablation holter showed no AF; PVCs & SB noted with 1st degree AVB, RBBB. Anticoagulated with Eliquis, denies bleeding issues. Previous:  Holter (09/20/2018): SB/NSR with 1st degree AVB, RBBB, occasional PVCs.     MAT (05/23/2018): LVEF 55-60%, no RWMA. LA mod dilated. RA mild to mod dilated. Mod MR.     Echo (06/16/2017): LVEF 50-55%, no RWMA. Mod ATILIO. Mod MR. Mod TR.     Followed by Dr. Nawaf Goff. Patient Active Problem List   Diagnosis Code    Acne rosacea L71.9    Hypercholesteremia E78.00    HTN (hypertension) I10    Thrombocytopenia ([facility] [address] D69.6    Advance care planning Z71.89    Advanced care planning/counseling discussion Z71.89    persistent atrial fibrillation I48.91    S/P ablation of atrial fibrillation Z98.890, Z86.79    Persistent atrial fibrillation I48.19     No current facility-administered medications on file prior to encounter. Current Outpatient Medications on File Prior to Encounter   Medication Sig Dispense Refill    dilTIAZem CD (CARDIZEM CD) 240 mg ER capsule Take 1 Cap by mouth daily. 30 Cap 5    lisinopril (PRINIVIL, ZESTRIL) 20 mg tablet TAKE 1 TABLET BY MOUTH DAILY 90 Tab 1    apixaban (ELIQUIS) 5 mg tablet Take 1 Tab by mouth two (2) times a day.  180 Tab 1       Current Facility-Administered Medications   Medication Dose Route Frequency Provider Last Rate Last Dose    sodium chloride (NS) flush 5-40 mL  5-40 mL IntraVENous Q8H Chanel Acosta MD        sodium chloride (NS) flush 5-40 mL  5-40 mL IntraVENous PRN Chanel Acosta MD         Allergies Allergen Reactions    Hydrochlorothiazide Rash    Pcn [Penicillins] Other (comments)     MOUTH SORE     Past Medical History:   Diagnosis Date    Acne rosacea 6/1/2010    HTN (hypertension) 6/1/2010    Hypercholesteremia 6/1/2010     No past surgical history   Family History   Problem Relation Age of Onset    Lung Disease Father      Social History     Tobacco Use    Smoking status: Former Smoker    Smokeless tobacco: Never Used    Tobacco comment: 1968   Substance Use Topics    Alcohol use: Yes        Review of Systems:   Constitutional: Negative for fever, chills, weight loss, malaise/fatigue. HEENT: Negative for nosebleeds, vision changes. Respiratory: Negative for cough, hemoptysis  Cardiovascular: Negative for chest pain, + palpitations, no orthopnea, claudication, leg swelling, syncope, and PND. Gastrointestinal: Negative for nausea, vomiting, diarrhea, blood in stool and melena. Genitourinary: Negative for dysuria, and hematuria. Musculoskeletal: Negative for myalgias, arthralgia. Skin: Negative for rash. Heme: Does not bleed or bruise easily. Neurological: Negative for speech change and focal weakness     Objective:     Visit Vitals  Ht 5' 10\" (1.778 m)   Wt 191 lb (86.6 kg)   BMI 27.41 kg/m²      Physical Exam:   Constitutional: well-developed and well-nourished. No respiratory distress. Head: Normocephalic and atraumatic. Eyes: Pupils are equal, round  ENT: hearing normal  Neck: supple. No JVD present. Cardiovascular: Normal rate, regular rhythm. Exam reveals no gallop and no friction rub. No murmur heard. Pulmonary/Chest: Effort normal and breath sounds normal. No wheezes. Abdominal: Soft, no tenderness. Musculoskeletal: no edema. Neurological: alert,oriented. Skin: Skin is warm and dry  Psychiatric: normal mood and affect.  Behavior is normal. Judgment and thought content normal.         Assessment/Plan:   PAF   Hypertension   Palpitation   He has moderate LA enlargement and previous AFIB ablation   He has had recurrence and would like to have repeated ablation  Risks include but are not limited to bleeding in the groin, infection in the groin and/or heart valves, fistula between the groin arteries and veins, valvular damage, diaphragmatic paralysis, aortic dissection, heart attack, stroke, blood clot in the leg, pulmonary embolism, lung collapse (pneumothorax or hemothorax), heart collapse (pericardial tamponade), death. The added risks for left atrial ablation may be atrial-esophageal fistula, pulmonary vein stenoses, kidney failure (from contrast injection), higher risk of bleeding, stroke and heart attack. Elective or emergency surgery may be required to repair some of these complications. Prolonged hospitalization would be required. General anesthesia and transeptal catheterization may be required for the procedure     Thank you for involving me in this patient's care and please call with further concerns or questions. Erick Lama M.D.   Electrophysiology/Cardiology  [facility] and [facility] [place]  [address] [place] [address]                             [phone]

## 2020-01-08 NOTE — PROGRESS NOTES
TRANSFER - OUT REPORT:    Verbal report given to [address] on Verdia Mac being transferred to Room 453 for routine progression of care       Report consisted of patients Situation, Background, Assessment and   Recommendations(SBAR). Information from the following report(s) SBAR, Procedure Summary, Intake/Output, Recent Results and Cardiac Rhythm NSR was reviewed with the receiving nurse. Opportunity for questions and clarification was provided.

## 2020-01-08 NOTE — PROGRESS NOTES
TRANSFER - IN REPORT:    Verbal report received from [address] [facility] on Denise Alvarez  being received from procedure for routine progression of care. Report consisted of patients Situation, Background, Assessment and Recommendations(SBAR). Information from the following report(s) Procedure Summary was reviewed with the receiving clinician. Opportunity for questions and clarification was provided. Assessment completed upon patients arrival to [address] [place] [address]. Cardiac Cath Lab Recovery Arrival Note:    Denise Alvarez arrived to [facility] recovery area. Patient procedure= atrial flutter/ atrial fibrillation ablation. Patient on cardiac monitor, non-invasive blood pressure, SPO2 monitor. On O2 @ 3 lpm via nasal cannula. IV  of 0.9% normal saline on pump at 25 ml/hr. Patient status doing well without problems. Patient is A&Ox 4. Patient reports no complaints. PROCEDURE SITE CHECK:    Procedure site:without any bleeding or hematoma, no pain/discomfort reported at procedure site. No change in patient status. Continue to monitor patient and status.

## 2020-01-08 NOTE — Clinical Note
Transseptal Cath Performed check box under hemodynamic and ICE and Fluoro, utilizing a standard needle, [place] 98cm via a guiding sheath. Needle inserted.

## 2020-01-08 NOTE — PROGRESS NOTES
Transfer to [address] [place] [address] from Procedure Area    Verbal report given to [facility] on Sara Rodriguez being transferred to Cardiac Cath Lab  for routine post - op   Patient is post Aflutter/Afib ablation procedure. Patient stable upon transfer to . Report consisted of patients Situation, Background, Assessment and   Recommendations(SBAR). Information from the following report(s) Procedure Summary, Intake/Output, MAR and Cardiac Rhythm NSR was reviewed with the receiving nurse. Opportunity for questions and clarification was provided. Patient medicated during procedure with orders obtained and verified by Dr. Mary Ann Woodward. Refer to patient PROCEDURE REPORT for vital signs, assessment, status, and response during procedure.

## 2020-01-08 NOTE — ANESTHESIA PREPROCEDURE EVALUATION
Anesthetic History   No history of anesthetic complications            Review of Systems / Medical History  Patient summary reviewed, nursing notes reviewed and pertinent labs reviewed    Pulmonary  Within defined limits                 Neuro/Psych   Within defined limits           Cardiovascular  Within defined limits  Hypertension        Dysrhythmias : atrial fibrillation        Comments: · Left Ventricle: Normal cavity size and systolic function (ejection fraction normal). Mild concentric hypertrophy. Calculated left ventricular ejection fraction is 50%. Biplane method used to measure ejection fraction. No regional wall motion abnormality noted. · Left Atrium: Moderately dilated left atrium. · Mitral Valve: Mild mitral valve regurgitation is present.    GI/Hepatic/Renal  Within defined limits              Endo/Other  Within defined limits           Other Findings              Physical Exam    Airway  Mallampati: II  TM Distance: > 6 cm  Neck ROM: normal range of motion   Mouth opening: Normal     Cardiovascular  Regular rate and rhythm,  S1 and S2 normal,  no murmur, click, rub, or gallop             Dental  No notable dental hx       Pulmonary  Breath sounds clear to auscultation               Abdominal  GI exam deferred       Other Findings            Anesthetic Plan    ASA: 2  Anesthesia type: general    Monitoring Plan: Arterial line      Induction: Intravenous  Anesthetic plan and risks discussed with: Patient

## 2020-01-08 NOTE — ANESTHESIA POSTPROCEDURE EVALUATION
Procedure(s):  ABLATION A-FIB  W COMPLETE EP STUDY  Ep 3d Mapping  Intracardiac Echocardiogram  Ablation Following A-Fib  Addl.    general    Anesthesia Post Evaluation      Multimodal analgesia: multimodal analgesia not used between 6 hours prior to anesthesia start to PACU discharge  Patient location during evaluation: PACU  Patient participation: complete - patient participated  Level of consciousness: awake  Pain score: 0  Pain management: adequate  Airway patency: patent  Anesthetic complications: no  Cardiovascular status: acceptable  Respiratory status: acceptable  Hydration status: acceptable  Comments: I have evaluated the patient and meets criteria for discharge from PACU. Tamanna Angel MD        Vitals Value Taken Time   /51 1/8/2020  5:23 PM   Temp     Pulse 65 1/8/2020  5:34 PM   Resp     SpO2 100 % 1/8/2020  5:34 PM   Vitals shown include unvalidated device data.

## 2020-01-08 NOTE — PROGRESS NOTES
1745: TRANSFER - IN REPORT:    Verbal report received from remedios luong(name) on Queta Flores  being received from cath lab (unit) for ordered procedure      Report consisted of patients Situation, Background, Assessment and   Recommendations(SBAR). Information from the following report(s) SBAR, Kardex, Procedure Summary, Intake/Output, MAR and Recent Results was reviewed with the receiving nurse. Opportunity for questions and clarification was provided. 1830: pt arrived to floor tele placed and confirmed with monitor tech. 1930: Bedside and Verbal shift change report given to [address] rn (oncoming nurse) by kel arroyo rn (offgoing nurse). Report included the following information SBAR, Kardex, Procedure Summary, Intake/Output, MAR and Recent Results.

## 2020-01-08 NOTE — PROGRESS NOTES
Cardiac Cath Lab Procedure Area Arrival Note:    Alin Rubio arrived to Cardiac Cath Lab, Procedure Area. Patient identifiers verified with NAME and DATE OF BIRTH. Procedure verified with patient. Consent forms verified. Allergies verified. Patient informed of procedure and plan of care. Questions answered with review. Patient voiced understanding of procedure and plan of care. Patient on cardiac monitor, non-invasive blood pressure, SPO2 monitor. On room air. Placed on O2 per CRNA  IV of NS on pump at 25 ml/hr. Patient status doing well without problems. Patient is A&Ox 4. Patient reports no pain. Patient medicated during procedure with orders obtained and verified by Dr. Delilah Mack. Refer to patients Cardiac Cath Lab PROCEDURE REPORT for vital signs, assessment, status, and response during procedure, printed at end of case. Printed report on chart or scanned into chart.

## 2020-01-08 NOTE — PROCEDURES
Cardiac Procedure Note   Patient: Rena Kimble  MRN: [id number]  SSN: [id number]   YOB: 1940 Age: 78 y.o.   Sex: male    Date of Procedure: 1/8/2020   Pre-procedure Diagnosis: atrial flutter fibrillation RVR  Post-procedure Diagnosis: same  Procedure: EP Study and Ablation typical atrial flutter and fibrillation  :  Dr. Temi Muñoz MD    Assistant(s):  None  Anesthesia: general   Estimated Blood Loss: Less than 10 mL   Specimens Removed: None  Findings: typical atrial flutter on presentation   Ablated to NSR  PVI for AFIB completed  Complications: None   Implants:  None  Signed by:  Temi Muñoz MD  1/8/2020  4:47 PM

## 2020-01-08 NOTE — ANESTHESIA PROCEDURE NOTES
Arterial Line Placement    Start time: 1/8/2020 12:00 PM  End time: 1/8/2020 12:00 PM  Performed by: Hieu Shin MD  Authorized by: Hieu Shin MD     Pre-Procedure  Indications:  Arterial pressure monitoring and blood sampling  Preanesthetic Checklist: patient identified, risks and benefits discussed, anesthesia consent, site marked, patient being monitored, timeout performed and patient being monitored    Timeout Time: 12:00        Procedure:   Prep:  ChloraPrep  Seldinger Technique?: Yes    Orientation:  Left  Location:  Radial artery  Catheter size:  20 G  Number of attempts:  1    Assessment:   Post-procedure:  Line secured and sterile dressing applied  Patient Tolerance:  Patient tolerated the procedure well with no immediate complications

## 2020-01-09 VITALS
HEART RATE: 86 BPM | HEIGHT: 70 IN | OXYGEN SATURATION: 97 % | DIASTOLIC BLOOD PRESSURE: 64 MMHG | RESPIRATION RATE: 16 BRPM | WEIGHT: 191 LBS | TEMPERATURE: 97.8 F | BODY MASS INDEX: 27.35 KG/M2 | SYSTOLIC BLOOD PRESSURE: 125 MMHG

## 2020-01-09 LAB
ACT BLD: 142 SECS (ref 79–138)
ACT BLD: 180 SECS (ref 79–138)
ACT BLD: 241 SECS (ref 79–138)
ACT BLD: 252 SECS (ref 79–138)
ANION GAP SERPL CALC-SCNC: 7 MMOL/L (ref 5–15)
BUN SERPL-MCNC: 16 MG/DL (ref 6–20)
BUN/CREAT SERPL: 10 (ref 12–20)
CALCIUM SERPL-MCNC: 9 MG/DL (ref 8.5–10.1)
CHLORIDE SERPL-SCNC: 107 MMOL/L (ref 97–108)
CO2 SERPL-SCNC: 23 MMOL/L (ref 21–32)
CREAT SERPL-MCNC: 1.58 MG/DL (ref 0.7–1.3)
GLUCOSE SERPL-MCNC: 144 MG/DL (ref 65–100)
POTASSIUM SERPL-SCNC: 4.4 MMOL/L (ref 3.5–5.1)
SODIUM SERPL-SCNC: 137 MMOL/L (ref 136–145)

## 2020-01-09 PROCEDURE — 99218 HC RM OBSERVATION: CPT

## 2020-01-09 PROCEDURE — 36415 COLL VENOUS BLD VENIPUNCTURE: CPT

## 2020-01-09 PROCEDURE — 74011250637 HC RX REV CODE- 250/637: Performed by: INTERNAL MEDICINE

## 2020-01-09 PROCEDURE — 80048 BASIC METABOLIC PNL TOTAL CA: CPT

## 2020-01-09 RX ADMIN — ACETAMINOPHEN 650 MG: 325 TABLET ORAL at 09:01

## 2020-01-09 RX ADMIN — FUROSEMIDE 20 MG: 20 TABLET ORAL at 09:00

## 2020-01-09 RX ADMIN — APIXABAN 5 MG: 5 TABLET, FILM COATED ORAL at 09:00

## 2020-01-09 RX ADMIN — Medication 10 ML: at 06:00

## 2020-01-09 RX ADMIN — LISINOPRIL 20 MG: 20 TABLET ORAL at 09:00

## 2020-01-09 NOTE — PROGRESS NOTES
1951: Bedside and Verbal shift change report given to [address] (oncoming nurse) by Letty Holguin RN (offgoing nurse). Report included the following information SBAR, Kardex, Procedure Summary, Intake/Output, MAR, Recent Results, Med Rec Status and Cardiac Rhythm NSR.

## 2020-01-09 NOTE — DISCHARGE SUMMARY
Cardiology Discharge Summary               Patient ID:  Ирина Peace  [id number]  63 y.o.  1940    Admit Date: 1/8/2020    Discharge Date: 1/9/2020     Admitting Physician: Patric Baumgarten, MD     Discharge Physician: Mendoza Riojas NP/Alok Jerez MD    Admission Diagnoses: Paroxysmal atrial fibrillation [facility]) [I48.0]  S/P ablation of atrial fibrillation [Z98.890, Z86.79]  Atrial fibrillation [facility]) [I48.91]    Discharge Diagnoses: Principal Problem:    S/P ablation of atrial fibrillation (5/23/2018)      Overview: 5/23/2018    Active Problems:    Persistent atrial fibrillation (5/23/2018)      Atrial fibrillation ([address] (1/8/2020)      Typical atrial flutter ([facility] (1/8/2020)      Status post catheter ablation of atrial flutter (1/8/2020)      Overview: 1/8/2020        Discharge Condition: Stable    Cardiology Procedures this Admission:   1. Comprehensive Electrophysiology study including CS/LA pacing   2. Ablations of persistent atrial fibrillation by pulmonary vein isolations and ablations of typical atrial flutter. 3. Intracardiac echocardiogram was utilized and one transseptal catheterization was performed. 4. Fluoroscopy was also used along with 3-dimensional electroanatomical DILIA Precision mapping. Hospital Course:   Mr. Garcia presented from home on 01/08/2020 for planned ablation of AF & typical atrial flutter. He tolerated procedure well. He remained overnight for observation, remained stable overnight. No AF noted on monitor. NSR 70s-90s. BP stable, 125/64. Bilateral femoral access sites without bleeding or hematoma. Discharge Exam:    Visit Vitals  /64 (BP 1 Location: Right arm, BP Patient Position: At rest)   Pulse 86   Temp 97.8 °F (36.6 °C)   Resp 16   Ht 5' 10\" (1.778 m)   Wt 191 lb (86.6 kg)   SpO2 97%   BMI 27.41 kg/m²       Physical Exam:   Constitutional: well-developed and well-nourished. No respiratory distress. Head: Normocephalic and atraumatic.    Eyes: Pupils are equal, round  ENT: hearing normal  Neck: supple. No JVD present. Cardiovascular: Normal rate, regular rhythm. Exam reveals no gallop and no friction rub. No murmur heard. Pulmonary/Chest: Effort normal and breath sounds normal. No wheezes. Abdominal: Soft, no tenderness. Musculoskeletal: no edema. Neurological: alert,oriented. Skin: Skin is warm and dry. Bilateral femoral access sites without bleeding or hematoma. Psychiatric: normal mood and affect. Behavior is normal. Judgment and thought content normal.        Disposition: home    Patient Instructions:   Current Discharge Medication List      CONTINUE these medications which have NOT CHANGED    Details   furosemide (LASIX) 20 mg tablet Take 1 Tab by mouth daily. Qty: 30 Tab, Refills: 5      lisinopril (PRINIVIL, ZESTRIL) 20 mg tablet TAKE 1 TABLET BY MOUTH DAILY  Qty: 90 Tab, Refills: 1      apixaban (ELIQUIS) 5 mg tablet Take 1 Tab by mouth two (2) times a day. Qty: 180 Tab, Refills: 1         STOP taking these medications       dilTIAZem CD (CARDIZEM CD) 240 mg ER capsule Comments:   Reason for Stopping:               Assessment/Plan    Mr. Garcia underwent repeat ablation of AF & ablation of typical atrial flutter on 01/08/2020. Stop diltiazem since he is back in NSR    Continue other medications as previously prescribed. He should resume Eliquis this morning. Reviewed activity restrictions post ablation as listed on discharge instructions. Follow up in EP clinic as noted below.     Future Appointments   Date Time Provider Sancho Harmon   1/23/2020  1:20 PM Myrna Connelly MD [address] [place] [address]  01/09/20    Addendum from EP attending:   I have seen, examined patient, and discussed with nurse practitioner, registered nurse, reviewed, updated note and agree with the assessment and plan    I have talked to him this am. He is feeling fine, no concerns  Vital signs are stable  Exam shows regular rhythm and no rub  Lungs are clear  No groin bleeding or hematoma  Assessment and Plan:  He remains in NSR  BP is ok with one BP medication so he has stopped cardizem for now but monitor BP at home and in office    Martina Mcneil M.D.   Electrophysiology/Cardiology  [facility]  [address] [place] [address]                             [phone]

## 2020-01-09 NOTE — DISCHARGE INSTRUCTIONS
Patient Instructions Post-EP study and ablation    1. No heavy lifting or exercises for 1 week. This includes the following:  Do not push or move furniture, jog or run    2. Do not drive for 3 days. 3.  Call Dr. Chele Ewing at [phone] if you experience any of the following symptoms:  Dizziness, lightheadedness, fainting spells  Lack of energy  Shortness of breath  Rapid heart rate  Chest or muscle twitches  Blurry vision, double vision, weakness, numbness  Nausea, vomiting  Fever  Bleeding in the stool, black stool  Leg swelling, pain    4. Follow-up with Dr. Chele Ewing as noted below. Future Appointments   Date Time Provider Sancho Harmon   1/23/2020  1:20 PM MD Janell Gardner M.D.   Electrophysiology/Cardiology  [facility] [place]  [address] [place] [address]                             [phone]

## 2020-01-09 NOTE — PROGRESS NOTES
10:45 Discharge instructions with med change reviewed with patient and spouse. Copy given to patient. IV and telemetry monitor removed.

## 2020-01-23 ENCOUNTER — OFFICE VISIT (OUTPATIENT)
Dept: CARDIOLOGY CLINIC | Age: 80
End: 2020-01-23

## 2020-01-23 VITALS
BODY MASS INDEX: 26.77 KG/M2 | WEIGHT: 187 LBS | SYSTOLIC BLOOD PRESSURE: 140 MMHG | OXYGEN SATURATION: 99 % | HEIGHT: 70 IN | DIASTOLIC BLOOD PRESSURE: 90 MMHG | HEART RATE: 75 BPM | RESPIRATION RATE: 18 BRPM

## 2020-01-23 DIAGNOSIS — I48.3 TYPICAL ATRIAL FLUTTER (HCC): ICD-10-CM

## 2020-01-23 DIAGNOSIS — N18.30 CKD (CHRONIC KIDNEY DISEASE) STAGE 3, GFR 30-59 ML/MIN (HCC): ICD-10-CM

## 2020-01-23 DIAGNOSIS — I44.0 FIRST DEGREE AV BLOCK: ICD-10-CM

## 2020-01-23 DIAGNOSIS — I48.19 OTHER PERSISTENT ATRIAL FIBRILLATION (HCC): Primary | ICD-10-CM

## 2020-01-23 DIAGNOSIS — Z86.79 S/P ABLATION OF ATRIAL FIBRILLATION: ICD-10-CM

## 2020-01-23 DIAGNOSIS — Z98.890 S/P ABLATION OF ATRIAL FIBRILLATION: ICD-10-CM

## 2020-01-23 DIAGNOSIS — Z98.890 STATUS POST CATHETER ABLATION OF ATRIAL FLUTTER: ICD-10-CM

## 2020-01-23 RX ORDER — BACLOFEN 10 MG/1
10 TABLET ORAL
COMMUNITY
Start: 2019-08-26 | End: 2020-01-23

## 2020-01-23 NOTE — PROGRESS NOTES
Cardiac Electrophysiology Office Note     Subjective:      Alin Rubio is a 78 y.o. male patient who is seen for follow up of atrial fibrillation and atrial flutter ablation. He has done well since then   No palpitation   sorethroat after intubation for general anesthesia during the first week  Echo 10/17/2019 LVEF 55% visually and biplane measurement 50%  Mild MR  Moderate LAE     In the past:    Post ablation holter showed no AF; PVCs & SB noted with 1st degree AVB, RBBB. Anticoagulated with Eliquis, denies bleeding issues. Previous:  Holter (09/20/2018): SB/NSR with 1st degree AVB, RBBB, occasional PVCs. MAT (05/23/2018): LVEF 55-60%, no RWMA. LA mod dilated. RA mild to mod dilated. Mod MR. Echo (06/16/2017): LVEF 50-55%, no RWMA. Mod ATILIO. Mod MR. Mod TR. Followed by Dr. Rohini Long.      Problem List  Date Reviewed: 1/23/2020          Codes Class Noted    persistent atrial fibrillation ICD-10-CM: I48.91  ICD-9-CM: 427.31  5/23/2018        Atrial fibrillation ([facility] [address] ICD-10-CM: I48.91  ICD-9-CM: 427.31  1/8/2020        Typical atrial flutter ([address] ICD-10-CM: I48.3  ICD-9-CM: 427.32  1/8/2020        Status post catheter ablation of atrial flutter ICD-10-CM: Z98.890  ICD-9-CM: V45.89  1/8/2020    Overview Signed 1/8/2020  6:07 PM by Kely Sims MD     1/8/2020             S/P ablation of atrial fibrillation ICD-10-CM: Z98.890, Z86.79  ICD-9-CM: V45.89  5/23/2018    Overview Signed 5/23/2018  5:26 PM by Kely Sims MD     5/23/2018             Persistent atrial fibrillation ICD-10-CM: I48.19  ICD-9-CM: 427.31  5/23/2018        Advanced care planning/counseling discussion ICD-10-CM: Z71.89  ICD-9-CM: V65.49  4/18/2017    Overview Signed 4/18/2017  3:51 PM by Lena Hawkins MD     Asked pt to bring in             Thrombocytopenia [facility]) ICD-10-CM: D69.6  ICD-9-CM: 287.5  3/8/2016        Advance care planning ICD-10-CM: Z71.89  ICD-9-CM: V65.49  3/8/2016    Overview Signed 3/8/2016 9:04 AM by Janelle Owens MD     Pt has one             Acne rosacea ICD-10-CM: L71.9  ICD-9-CM: 695.3  6/1/2010        Hypercholesteremia ICD-10-CM: E78.00  ICD-9-CM: 272.0  6/1/2010        HTN (hypertension) ICD-10-CM: I10  ICD-9-CM: 401.9  6/1/2010              Current Outpatient Medications   Medication Sig Dispense Refill    furosemide (LASIX) 20 mg tablet Take 1 Tab by mouth daily. 30 Tab 5    lisinopril (PRINIVIL, ZESTRIL) 20 mg tablet TAKE 1 TABLET BY MOUTH DAILY 90 Tab 1    apixaban (ELIQUIS) 5 mg tablet Take 1 Tab by mouth two (2) times a day. 180 Tab 1    baclofen (LIORESAL) 10 mg tablet Take 10 mg by mouth. Allergies   Allergen Reactions    Hydrochlorothiazide Rash    Pcn [Penicillins] Other (comments)     MOUTH SORE     Past Medical History:   Diagnosis Date    Acne rosacea 6/1/2010    HTN (hypertension) 6/1/2010    Hypercholesteremia 6/1/2010     No past surgical history   Family History   Problem Relation Age of Onset    Lung Disease Father      Social History     Tobacco Use    Smoking status: Former Smoker    Smokeless tobacco: Never Used    Tobacco comment: 1968   Substance Use Topics    Alcohol use: Yes     Alcohol/week: 8.0 standard drinks     Types: 8 Cans of beer per week      Review of Systems:   Constitutional: Negative for fever, chills, weight loss   HEENT: Negative for nosebleeds, vision changes. Respiratory: Negative for cough, hemoptysis, sputum production, and wheezing. Cardiovascular: Negative for chest pain, no orthopnea, claudication, no syncope, and PND.  + chronic stable leg edema  Gastrointestinal: Negative for nausea, vomiting, diarrhea, constipation, blood in stool and melena. Genitourinary: Negative for dysuria, and hematuria. Musculoskeletal: Negative for myalgias   Skin: Negative for rash.    Heme: Does not bleed   Neurological: Negative for speech change and focal weakness     Objective:     Visit Vitals  /90 (BP 1 Location: Left arm, BP Patient Position: Sitting)   Pulse 75   Resp 18   Ht 5' 10\" (1.778 m)   Wt 187 lb (84.8 kg)   SpO2 99%   BMI 26.83 kg/m²        Physical Exam:   Constitutional: Well-nourished. No distress. Head: Normocephalic and atraumatic. Eyes: Pupils are equal, round . Neck: Supple. No JVD present. Cardiovascular: normal rate, regular rhythm. Exam reveals no gallop and no friction rub. 2/6 Systolic murmur. Pulmonary/Chest: Effort normal and breath sounds normal. No wheezes. Abdominal: Soft, no tenderness. Musculoskeletal: 1+ bilateral lower extremity edema. Neurological: Alert,oriented. Skin: Skin is warm and dry. Psychiatric: Normal mood and affect. Behavior is normal. Judgment and thought content normal.      ECG Sinus  Rhythm   first degree av block  Rsr'1 non specific    Assessment/Plan:       ICD-10-CM ICD-9-CM    1. Other persistent atrial fibrillation I48.19 427.31 AMB POC EKG ROUTINE W/ 12 LEADS, INTER & REP   2. S/P ablation of atrial fibrillation Z98.890 V45.89     Z86.79     3. First degree AV block I44.0 426.11    4. Typical atrial flutter ([facility]) I48.3 427.32    5. Status post catheter ablation of atrial flutter Z98.890 V45.89    6. CKD (chronic kidney disease) stage 3, GFR 30-59 ml/min ([facility]) N18.3 585.3      Mr. Garcia has done well post ablation   He has not had recurrent PAF or flutter  His MR is improved on recent echo  LVEF is 50%  He improved with NSR   bp ok with lisinopril  Creatinine 1.58 and he could be on 2.5 mg bid eliquis but this needs to be rechecked  He sees Dr Mikayla Haque next and said he will have labs  holter with 3 month visit    Future Appointments   Date Time Provider Sancho [place]   1/29/2020 11:20 AM Josh Saab MD [facility] [address]         Thank you for involving me in this patient's care and please call with further concerns or questions. Oneil Dunn M.D.   Electrophysiology/Cardiology  [facility] [place]  [address] [facility] [address] [facility], [address] [place] [address]  [phone]

## 2020-01-23 NOTE — PROGRESS NOTES
All health maintenance and other pertinent information has been reviewed in preparation for today's office visit. Patient presents in the office today for:    Chief Complaint   Patient presents with    Follow-up     2 week f/u from a-fib     1. Have you been to the ER, urgent care clinic since your last visit? Hospitalized since your last visit? No    2. Have you seen or consulted any other health care providers outside of the [address] [place] [address] since your last visit? Include any pap smears or colon screening.  No  Visit Vitals  /90 (BP 1 Location: Left arm, BP Patient Position: Sitting)   Pulse 75   Resp 18   Ht 5' 10\" (1.778 m)   Wt 187 lb (84.8 kg)   SpO2 99%   BMI 26.83 kg/m²

## 2020-02-11 ENCOUNTER — OFFICE VISIT (OUTPATIENT)
Dept: FAMILY MEDICINE CLINIC | Age: 80
End: 2020-02-11

## 2020-02-11 ENCOUNTER — HOSPITAL ENCOUNTER (OUTPATIENT)
Dept: LAB | Age: 80
Discharge: HOME | End: 2020-02-11

## 2020-02-11 VITALS
OXYGEN SATURATION: 100 % | DIASTOLIC BLOOD PRESSURE: 77 MMHG | SYSTOLIC BLOOD PRESSURE: 152 MMHG | WEIGHT: 186 LBS | BODY MASS INDEX: 26.63 KG/M2 | RESPIRATION RATE: 17 BRPM | HEIGHT: 70 IN | TEMPERATURE: 97.5 F | HEART RATE: 87 BPM

## 2020-02-11 DIAGNOSIS — D69.6 THROMBOCYTOPENIA (HCC): ICD-10-CM

## 2020-02-11 DIAGNOSIS — I48.19 OTHER PERSISTENT ATRIAL FIBRILLATION (HCC): ICD-10-CM

## 2020-02-11 DIAGNOSIS — J06.9 UPPER RESPIRATORY TRACT INFECTION, UNSPECIFIED TYPE: ICD-10-CM

## 2020-02-11 DIAGNOSIS — N18.30 CKD (CHRONIC KIDNEY DISEASE) STAGE 3, GFR 30-59 ML/MIN (HCC): ICD-10-CM

## 2020-02-11 DIAGNOSIS — R30.0 DYSURIA: ICD-10-CM

## 2020-02-11 DIAGNOSIS — Z00.00 ROUTINE GENERAL MEDICAL EXAMINATION AT A HEALTH CARE FACILITY: Primary | ICD-10-CM

## 2020-02-11 DIAGNOSIS — Z12.5 PROSTATE CANCER SCREENING: ICD-10-CM

## 2020-02-11 DIAGNOSIS — Z71.89 ADVANCED CARE PLANNING/COUNSELING DISCUSSION: ICD-10-CM

## 2020-02-11 DIAGNOSIS — I10 ESSENTIAL HYPERTENSION: ICD-10-CM

## 2020-02-11 DIAGNOSIS — R73.9 ELEVATED BLOOD SUGAR: ICD-10-CM

## 2020-02-11 DIAGNOSIS — Z00.00 ROUTINE GENERAL MEDICAL EXAMINATION AT A HEALTH CARE FACILITY: ICD-10-CM

## 2020-02-11 LAB
ALBUMIN SERPL-MCNC: 3.6 G/DL (ref 3.5–5)
ALBUMIN/GLOB SERPL: 1.1 {RATIO} (ref 1.1–2.2)
ALP SERPL-CCNC: 94 U/L (ref 45–117)
ALT SERPL-CCNC: 19 U/L (ref 12–78)
ANION GAP SERPL CALC-SCNC: 4 MMOL/L (ref 5–15)
AST SERPL-CCNC: 21 U/L (ref 15–37)
BASOPHILS # BLD: 0.1 K/UL (ref 0–0.1)
BASOPHILS NFR BLD: 1 % (ref 0–1)
BILIRUB SERPL-MCNC: 0.5 MG/DL (ref 0.2–1)
BILIRUB UR QL STRIP: NEGATIVE
BUN SERPL-MCNC: 18 MG/DL (ref 6–20)
BUN/CREAT SERPL: 13 (ref 12–20)
CALCIUM SERPL-MCNC: 9 MG/DL (ref 8.5–10.1)
CHLORIDE SERPL-SCNC: 105 MMOL/L (ref 97–108)
CO2 SERPL-SCNC: 28 MMOL/L (ref 21–32)
CREAT SERPL-MCNC: 1.4 MG/DL (ref 0.7–1.3)
DIFFERENTIAL METHOD BLD: ABNORMAL
EOSINOPHIL # BLD: 0.3 K/UL (ref 0–0.4)
EOSINOPHIL NFR BLD: 5 % (ref 0–7)
ERYTHROCYTE [DISTWIDTH] IN BLOOD BY AUTOMATED COUNT: 16.9 % (ref 11.5–14.5)
EST. AVERAGE GLUCOSE BLD GHB EST-MCNC: 126 MG/DL
GLOBULIN SER CALC-MCNC: 3.3 G/DL (ref 2–4)
GLUCOSE SERPL-MCNC: 86 MG/DL (ref 65–100)
GLUCOSE UR-MCNC: NEGATIVE MG/DL
HBA1C MFR BLD: 6 % (ref 4–5.6)
HCT VFR BLD AUTO: 36.1 % (ref 36.6–50.3)
HGB BLD-MCNC: 10.2 G/DL (ref 12.1–17)
IMM GRANULOCYTES # BLD AUTO: 0 K/UL (ref 0–0.04)
IMM GRANULOCYTES NFR BLD AUTO: 0 % (ref 0–0.5)
KETONES P FAST UR STRIP-MCNC: NEGATIVE MG/DL
LYMPHOCYTES # BLD: 1 K/UL (ref 0.8–3.5)
LYMPHOCYTES NFR BLD: 19 % (ref 12–49)
MCH RBC QN AUTO: 20.8 PG (ref 26–34)
MCHC RBC AUTO-ENTMCNC: 28.3 G/DL (ref 30–36.5)
MCV RBC AUTO: 73.7 FL (ref 80–99)
MONOCYTES # BLD: 0.6 K/UL (ref 0–1)
MONOCYTES NFR BLD: 11 % (ref 5–13)
NEUTS SEG # BLD: 3.3 K/UL (ref 1.8–8)
NEUTS SEG NFR BLD: 64 % (ref 32–75)
NRBC # BLD: 0 K/UL (ref 0–0.01)
NRBC BLD-RTO: 0 PER 100 WBC
PH UR STRIP: 6 [PH] (ref 4.6–8)
PLATELET # BLD AUTO: 173 K/UL (ref 150–400)
PMV BLD AUTO: 10.8 FL (ref 8.9–12.9)
POTASSIUM SERPL-SCNC: 4.1 MMOL/L (ref 3.5–5.1)
PROT SERPL-MCNC: 6.9 G/DL (ref 6.4–8.2)
PROT UR QL STRIP: NEGATIVE
PSA SERPL-MCNC: 1.5 NG/ML (ref 0.01–4)
RBC # BLD AUTO: 4.9 M/UL (ref 4.1–5.7)
RBC MORPH BLD: ABNORMAL
RBC MORPH BLD: ABNORMAL
SODIUM SERPL-SCNC: 137 MMOL/L (ref 136–145)
SP GR UR STRIP: 1.01 (ref 1–1.03)
TSH SERPL DL<=0.05 MIU/L-ACNC: 1.26 UIU/ML (ref 0.36–3.74)
UA UROBILINOGEN AMB POC: NORMAL (ref 0.2–1)
URINALYSIS CLARITY POC: CLEAR
URINALYSIS COLOR POC: YELLOW
URINE BLOOD POC: NORMAL
URINE LEUKOCYTES POC: NEGATIVE
URINE NITRITES POC: NEGATIVE
WBC # BLD AUTO: 5.3 K/UL (ref 4.1–11.1)

## 2020-02-11 RX ORDER — BENZONATATE 100 MG/1
100 CAPSULE ORAL
Qty: 60 CAP | Refills: 1 | Status: SHIPPED | OUTPATIENT
Start: 2020-02-11 | End: 2020-02-18

## 2020-02-11 NOTE — PROGRESS NOTES
Chief Complaint   Patient presents with    Hypertension    Irregular Heart Beat     Eliquis    Labs     urine sample provided    Cough    Immunization/Injection     1. Have you been to the ER, urgent care clinic since your last visit? Hospitalized since your last visit? No    2. Have you seen or consulted any other health care providers outside of the [address] [place] [address] since your last visit? Include any pap smears or colon screening. No       Medicare Wellness Exam:    Chief Complaint   Patient presents with    Hypertension    Irregular Heart Beat     Eliquis    Labs     urine sample provided    Cough    Immunization/Injection     he is a 78y.o. year old male who presents for evaluation for their Medicare Wellness Visit. Elby Hacking is completed and assessed=yes  Depression Screen is completed and assessed=yes  Medication list reviewed and adjusted for accuracy=yes  Immunizations reviewed and updated=yes  Health/Preventative Screenings reviewed and updated=yes  ADL Functions reviewed=yes    Patient Active Problem List    Diagnosis    persistent atrial fibrillation    Atrial fibrillation (HCC)    Typical atrial flutter (HCC)    Status post catheter ablation of atrial flutter     1/8/2020      S/P ablation of atrial fibrillation     5/23/2018      Persistent atrial fibrillation    Advanced care planning/counseling discussion     Asked pt to bring in      Thrombocytopenia ([address]    Advance care planning     Pt has one      Acne rosacea    Hypercholesteremia    HTN (hypertension)       Reviewed PmHx, RxHx, FmHx, SocHx, AllgHx and updated and dated in the chart.     Review of Systems - negative except as listed above in the HPI    Objective:     Vitals:    02/11/20 1011 02/11/20 1026   BP: 161/80 152/77   Pulse: 87    Resp: 17    Temp: 97.5 °F (36.4 °C)    TempSrc: Oral    SpO2: 100%    Weight: 186 lb (84.4 kg)    Height: 5' 10\" (1.778 m)      Physical Examination: General appearance - alert, well appearing, and in no distress  Eyes - pupils equal and reactive, extraocular eye movements intact  Ears - bilateral TM's and external ear canals normal  Nose - normal and patent, no erythema, discharge or polyps  Mouth - mucous membranes moist, pharynx normal without lesions  Neck - supple, no significant adenopathy  Chest - clear to auscultation, no wheezes, rales or rhonchi, symmetric air entry  Heart - normal rate, regular rhythm, normal S1, S2, no murmurs, rubs, clicks or gallops  Abdomen - soft, nontender, nondistended, no masses or organomegaly      Assessment/ Plan:   Diagnoses and all orders for this visit:    1. Routine general medical examination at a health care facility  -     CBC WITH AUTOMATED DIFF; Future  -     TSH 3RD GENERATION; Future    2. Dysuria  -     AMB POC URINALYSIS DIP STICK AUTO W/O MICRO  Results for orders placed or performed in visit on 02/11/20   AMB POC URINALYSIS DIP STICK AUTO W/O MICRO   Result Value Ref Range    Color (UA POC) Yellow     Clarity (UA POC) Clear     Glucose (UA POC) Negative Negative    Bilirubin (UA POC) Negative Negative    Ketones (UA POC) Negative Negative    Specific gravity (UA POC) 1.015 1.001 - 1.035    Blood (UA POC) Trace Negative    pH (UA POC) 6.0 4.6 - 8.0    Protein (UA POC) Negative Negative    Urobilinogen (UA POC) 0.2 mg/dL 0.2 - 1    Nitrites (UA POC) Negative Negative    Leukocyte esterase (UA POC) Negative Negative       3. Upper respiratory tract infection, unspecified type  -observe    4. Essential hypertension  -     METABOLIC PANEL, COMPREHENSIVE; Future  -at goal for age    11. CKD (chronic kidney disease) stage 3, GFR 30-59 ml/min ([facility])  -     METABOLIC PANEL, COMPREHENSIVE; Future    6. Elevated blood sugar  -     METABOLIC PANEL, COMPREHENSIVE; Future  -     HEMOGLOBIN A1C WITH EAG; Future    7. Other persistent atrial fibrillation  -rate controlled    8. Thrombocytopenia ([facility])  -     CBC WITH AUTOMATED DIFF; Future    9. Advanced care planning/counseling discussion  -has LW    10. Prostate cancer screening  -     PSA, DIAGNOSTIC (PROSTATE SPECIFIC AG); Future         -Pain evaluation performed in office  -Cognitive Screen performed in office  -Depression Screen, Fall risks (by up and go test)  and ADL functionality were addressed  -Medication list updated and reviewed for any changes   -A comprehensive review of medical issues and a plan was formulated  -End of life planning was addressed with pt   -Health Screenings for preventions were addressed and a plan was formulated  -Shingles Vaccine was recommended  -Discussed with patient cancer risk factors and appropriate screenings for age  -Patient evaluated for colonoscopy and referred if needed per screeing criteria  -Labs from previous visits were discussed with patient   -Discussed with patient diet and exercise and formulated a plan as needed  -An Advanced care plan was developed with the patient.  -Alcohol screening performed and was negative    -  Follow-up and Dispositions    · Return in about 6 months (around 8/11/2020). I have discussed the diagnosis with the patient and the intended plan as seen in the above orders. The patient understands and agrees with the plan. The patient has received an after-visit summary and questions were answered concerning future plans. Medication Side Effects and Warnings were discussed with patien  Patient Labs were reviewed and or requested  Patient Past Records were reviewed and or requested    There are no Patient Instructions on file for this visit.       Gabrielle López M.D.

## 2020-02-17 RX ORDER — APIXABAN 5 MG/1
TABLET, FILM COATED ORAL
Qty: 180 TAB | Refills: 1 | Status: SHIPPED | OUTPATIENT
Start: 2020-02-17 | End: 2020-11-04

## 2020-02-17 NOTE — TELEPHONE ENCOUNTER
Cardiologist: Dr. Kitty Mays    Last appt: 1/23/2020  Future Appointments   Date Time Provider Sancho Harmon   4/20/2020 10:00 AM HOLTER, [address] Naomie Narayan   4/30/2020 10:20 AM Elizabeth Toth  [address]       Requested Prescriptions     Signed Prescriptions Disp Refills    ELIQUIS 5 mg tablet 180 Tab 1     Sig: TAKE 1 TABLET TWICE A DAY     Authorizing Provider: THEA KAMINSKI     Ordering User: LISA DEL RIO         Refills VO per Dr. Kitty Mays.

## 2020-03-11 RX ORDER — LISINOPRIL 20 MG/1
TABLET ORAL
Qty: 90 TAB | Refills: 1 | Status: SHIPPED | OUTPATIENT
Start: 2020-03-11 | End: 2020-09-25

## 2020-03-11 NOTE — TELEPHONE ENCOUNTER
Request for Lisinopril 20 mg daily. Last office visit 1/23/20, next office visit 4/30/20. Refills per verbal order from Dr. Emma Bejarano.

## 2020-04-20 ENCOUNTER — CLINICAL SUPPORT (OUTPATIENT)
Dept: CARDIOLOGY CLINIC | Age: 80
End: 2020-04-20

## 2020-04-20 DIAGNOSIS — Z98.890 S/P ABLATION OF ATRIAL FIBRILLATION: ICD-10-CM

## 2020-04-20 DIAGNOSIS — I44.0 FIRST DEGREE AV BLOCK: ICD-10-CM

## 2020-04-20 DIAGNOSIS — Z86.79 S/P ABLATION OF ATRIAL FIBRILLATION: ICD-10-CM

## 2020-04-20 DIAGNOSIS — I48.3 TYPICAL ATRIAL FLUTTER (HCC): ICD-10-CM

## 2020-04-20 DIAGNOSIS — I48.19 OTHER PERSISTENT ATRIAL FIBRILLATION (HCC): ICD-10-CM

## 2020-04-20 DIAGNOSIS — N18.30 CKD (CHRONIC KIDNEY DISEASE) STAGE 3, GFR 30-59 ML/MIN (HCC): ICD-10-CM

## 2020-04-20 DIAGNOSIS — Z98.890 STATUS POST CATHETER ABLATION OF ATRIAL FLUTTER: ICD-10-CM

## 2020-04-24 ENCOUNTER — DOCUMENTATION ONLY (OUTPATIENT)
Dept: CARDIOLOGY CLINIC | Age: 80
End: 2020-04-24

## 2020-04-24 NOTE — PROGRESS NOTES
holter with NSR and heart rate  bpm average 67 bpm  3 PAC 1 PVC  No afib  Future Appointments   Date Time Provider [facility]   4/30/2020 10:30 AM Idalia Howard  [address]

## 2020-04-27 NOTE — PROGRESS NOTES
Verified patient with two types of identifiers. Notified patient of holter results and discussed VV on Thursday. Patient verbalized understanding and will call with any other questions.       Future Appointments   Date Time Provider Sancho Harmon   4/30/2020 10:30 AM Leslie Joseph  [address]

## 2020-04-29 NOTE — PROGRESS NOTES
Cardiac Electrophysiology TELEPHONE VIRTUAL VISIT Note     Pursuant to the emergency declaration under the [facility], [facility] waiver authority and the [employer] Act, this Virtual  Visit was conducted, with patient's consent, to reduce the patient's risk of exposure to COVID-19 and provide continuity of care for an established patient. Services were provided through an audio synchronous discussion virtually to substitute for in-person clinic visit. Subjective:      Dunia Camacho is a 78 y.o. male patient who is addressed via synchronous audio call for follow up of persistent atrial fibrillation & typical atrial flutter, is s/p ablation on 01/08/2020. Post ablation holter this month showed no AF. Not currently on medication for rate or rhythm control. He denies known recurrent AF/AFL. He denies chest pain, palpitations, SOB, PND, orthopnea, or syncope. Chronic stable lower extremity edema, sockline. He reports BP has been well controlled. Anticoagulated with Eliquis, denies bleeding issues. Previous:  Holter (04/2020): Sinus rhtyhm  bpm, avg 67 bpm.  1st degree AVB. Rare 3 PACs, rare 1 PVCs. Echo 2019  Calculated left ventricular ejection fraction is 50%. Biplane method used to measure ejection fraction. No regional wall motion abnormality noted. · Left Atrium: Moderately dilated left atrium. · Mitral Valve: Mild mitral valve regurgitation is present. Holter (09/20/2018): SB/NSR with 1st degree AVB, RBBB, occasional PVCs.     MAT (05/23/2018): LVEF 55-60%, no RWMA. LA mod dilated. RA mild to mod dilated. Mod MR.     Echo (06/16/2017): LVEF 50-55%, no RWMA. Mod ATILIO. Mod MR. Mod TR.     Followed by Dr. Betty Quintero.         Problem List  Date Reviewed: 2/11/2020          Codes Class Noted    persistent atrial fibrillation ICD-10-CM: I48.91  ICD-9-CM: 427.31  5/23/2018 Atrial fibrillation ([facility] [address] ICD-10-CM: I48.91  ICD-9-CM: 427.31  1/8/2020        Typical atrial flutter (HCC) ICD-10-CM: I48.3  ICD-9-CM: 427.32  1/8/2020        Status post catheter ablation of atrial flutter ICD-10-CM: Z98.890  ICD-9-CM: V45.89  1/8/2020    Overview Signed 1/8/2020  6:07 PM by Krishna Yu MD     1/8/2020             S/P ablation of atrial fibrillation ICD-10-CM: Z98.890, Z86.79  ICD-9-CM: V45.89  5/23/2018    Overview Signed 5/23/2018  5:26 PM by Krishna Yu MD     5/23/2018             Persistent atrial fibrillation ICD-10-CM: I48.19  ICD-9-CM: 427.31  5/23/2018        Advanced care planning/counseling discussion ICD-10-CM: Z71.89  ICD-9-CM: V65.49  4/18/2017    Overview Signed 4/18/2017  3:51 PM by Eder Rosales MD     Asked pt to bring in             Thrombocytopenia [facility]) ICD-10-CM: D69.6  ICD-9-CM: 287.5  3/8/2016        Advance care planning ICD-10-CM: Z71.89  ICD-9-CM: V65.49  3/8/2016    Overview Signed 3/8/2016  9:04 AM by Eder Rosales MD     Pt has one             Acne rosacea ICD-10-CM: L71.9  ICD-9-CM: 695.3  6/1/2010        Hypercholesteremia ICD-10-CM: E78.00  ICD-9-CM: 272.0  6/1/2010        HTN (hypertension) ICD-10-CM: I10  ICD-9-CM: 401.9  6/1/2010              Current Outpatient Medications   Medication Sig Dispense Refill    lisinopriL (PRINIVIL, ZESTRIL) 20 mg tablet TAKE 1 TABLET BY MOUTH DAILY 90 Tab 1    ELIQUIS 5 mg tablet TAKE 1 TABLET TWICE A  Tab 1    furosemide (LASIX) 20 mg tablet Take 1 Tab by mouth daily.  30 Tab 5     Allergies   Allergen Reactions    Hydrochlorothiazide Rash    Pcn [Penicillins] Other (comments)     MOUTH SORE     Past Medical History:   Diagnosis Date    Acne rosacea 6/1/2010    HTN (hypertension) 6/1/2010    Hypercholesteremia 6/1/2010     No past surgical history   Family History   Problem Relation Age of Onset    Lung Disease Father      Social History     Tobacco Use    Smoking status: Former Smoker    Smokeless tobacco: Never Used    Tobacco comment: 1968   Substance Use Topics    Alcohol use: Yes     Alcohol/week: 8.0 standard drinks     Types: 8 Cans of beer per week      Review of Systems:   Constitutional: Negative for fever, chills, weight loss   HEENT: Negative for nosebleeds, vision changes. Respiratory: Negative for cough, hemoptysis, sputum production, and wheezing. Cardiovascular: Negative for chest pain, no orthopnea, claudication, no syncope, and PND.  + chronic stable bilateral ankle edema  Gastrointestinal: Negative for nausea, vomiting, diarrhea, constipation, blood in stool and melena. Genitourinary: Negative for dysuria, and hematuria. Musculoskeletal: Negative for myalgias   Skin: Negative for rash. Heme: Does not bleed   Neurological: Negative for speech change and focal weakness     Objective:   Due to this being a TeleHealth evaluation, many elements of the physical examination are unable to be assessed. Neuro: A&Ox3, speech clear, answering questions appropriately      Assessment/Plan:       ICD-10-CM ICD-9-CM    1. Other persistent atrial fibrillation I48.19 427.31    2. Typical atrial flutter (HCC) I48.3 427.32    3. S/P ablation of atrial fibrillation Z98.890 V45.89     Z86.79     4. Status post catheter ablation of atrial flutter Z98.890 V45.89    5. Essential hypertension I10 401.9    6. PAC (premature atrial contraction) I49.1 427.61    7. PVC (premature ventricular contraction) I49.3 427.69      Mr. Garcia has done well post AF/AFL ablation, has not had known recurrent PAF or AFL. Post ablation 3 month holter showed no AF or AFL. Last echo 2019 showed low normal LVEF with mod LA dilation & mild MR. BP reportedly well controlled. Continue lisinopril as previously prescribed. Continue Eliquis 5 mg po bid for embolic CVA prophylaxis.      EP clinic follow up in 6 months    Future Appointments   Date Time Provider Sancho Harmon   4/30/2020 10:30 AM Desean Yancey MD [address]     We discussed the expected course, resolution and complications of the diagnosis(es) in detail. Medication risks, benefits, costs, interactions, and alternatives were discussed as indicated. I advised him to contact the office if his condition worsens, changes or fails to improve as anticipated. He expressed understanding with the diagnosis(es) and plan. Patient was made aware and verbalized understanding that an appointment will be scheduled for them for a virtual visit and/or office visit within the above time frame. Patient understanding his/her responsibility to call and change time/date if he/she so chooses. Call duration: 11 minutes. Thank you for involving me in this patient's care and please call with further concerns or questions. Hesham Goodrich M.D.   Electrophysiology/Cardiology  [facility] [place]  [address], [address], Sukumar Claros [address] [place] [address]  [phone]

## 2020-04-30 ENCOUNTER — VIRTUAL VISIT (OUTPATIENT)
Dept: CARDIOLOGY CLINIC | Age: 80
End: 2020-04-30

## 2020-04-30 DIAGNOSIS — Z98.890 S/P ABLATION OF ATRIAL FIBRILLATION: ICD-10-CM

## 2020-04-30 DIAGNOSIS — Z98.890 STATUS POST CATHETER ABLATION OF ATRIAL FLUTTER: ICD-10-CM

## 2020-04-30 DIAGNOSIS — I48.19 OTHER PERSISTENT ATRIAL FIBRILLATION (HCC): Primary | ICD-10-CM

## 2020-04-30 DIAGNOSIS — I49.3 PVC (PREMATURE VENTRICULAR CONTRACTION): ICD-10-CM

## 2020-04-30 DIAGNOSIS — I48.3 TYPICAL ATRIAL FLUTTER (HCC): ICD-10-CM

## 2020-04-30 DIAGNOSIS — I10 ESSENTIAL HYPERTENSION: ICD-10-CM

## 2020-04-30 DIAGNOSIS — Z86.79 S/P ABLATION OF ATRIAL FIBRILLATION: ICD-10-CM

## 2020-04-30 DIAGNOSIS — I49.1 PAC (PREMATURE ATRIAL CONTRACTION): ICD-10-CM

## 2020-06-17 RX ORDER — FUROSEMIDE 20 MG/1
TABLET ORAL
Qty: 30 TAB | Refills: 5 | Status: SHIPPED | OUTPATIENT
Start: 2020-06-17 | End: 2021-01-04

## 2020-06-17 NOTE — TELEPHONE ENCOUNTER
Request for Lasix 20 mg daily. Last office visit 4/30/20, next office visit 11/5/20. Refills per verbal order from Dr. Camden Connor.

## 2020-09-25 RX ORDER — LISINOPRIL 20 MG/1
TABLET ORAL
Qty: 90 TAB | Refills: 1 | Status: SHIPPED | OUTPATIENT
Start: 2020-09-25 | End: 2021-04-05 | Stop reason: SDUPTHER

## 2020-09-25 NOTE — TELEPHONE ENCOUNTER
Cardiologist: Dr. Mason Winkler    Last appt: 4/30/2020  Future Appointments   Date Time Provider Sancho Eden   11/5/2020 10:40 AM MD TIFFANY Edwards BS AMB       Requested Prescriptions     Signed Prescriptions Disp Refills    lisinopriL (PRINIVIL, ZESTRIL) 20 mg tablet 90 Tab 1     Sig: TAKE 1 TABLET BY MOUTH DAILY     Authorizing Provider: THEA KAMINSKI     Ordering User: LISA DEL RIO         Refills VO per Dr. Mason Winkler.

## 2020-11-04 RX ORDER — APIXABAN 5 MG/1
TABLET, FILM COATED ORAL
Qty: 180 TAB | Refills: 1 | Status: SHIPPED | OUTPATIENT
Start: 2020-11-04 | End: 2021-07-31

## 2020-11-04 NOTE — TELEPHONE ENCOUNTER
Request for Eliquis 5 mg BID. Last office visit 4/30/20, next office visit 12/1/20. Refills per verbal order from Dr. Seth Lloyd.

## 2020-12-01 ENCOUNTER — OFFICE VISIT (OUTPATIENT)
Dept: CARDIOLOGY CLINIC | Age: 80
End: 2020-12-01
Payer: MEDICARE

## 2020-12-01 VITALS
SYSTOLIC BLOOD PRESSURE: 146 MMHG | HEIGHT: 70 IN | OXYGEN SATURATION: 98 % | WEIGHT: 186 LBS | DIASTOLIC BLOOD PRESSURE: 74 MMHG | HEART RATE: 95 BPM | BODY MASS INDEX: 26.63 KG/M2 | RESPIRATION RATE: 16 BRPM

## 2020-12-01 DIAGNOSIS — Z98.890 STATUS POST CATHETER ABLATION OF ATRIAL FLUTTER: ICD-10-CM

## 2020-12-01 DIAGNOSIS — R06.09 DOE (DYSPNEA ON EXERTION): ICD-10-CM

## 2020-12-01 DIAGNOSIS — Z98.890 S/P ABLATION OF ATRIAL FIBRILLATION: ICD-10-CM

## 2020-12-01 DIAGNOSIS — I48.3 TYPICAL ATRIAL FLUTTER (HCC): ICD-10-CM

## 2020-12-01 DIAGNOSIS — Z86.79 S/P ABLATION OF ATRIAL FIBRILLATION: ICD-10-CM

## 2020-12-01 DIAGNOSIS — I45.10 RBBB: ICD-10-CM

## 2020-12-01 DIAGNOSIS — I10 ESSENTIAL HYPERTENSION: ICD-10-CM

## 2020-12-01 DIAGNOSIS — I48.19 OTHER PERSISTENT ATRIAL FIBRILLATION (HCC): Primary | ICD-10-CM

## 2020-12-01 DIAGNOSIS — I44.0 FIRST DEGREE AV BLOCK: ICD-10-CM

## 2020-12-01 PROCEDURE — G8536 NO DOC ELDER MAL SCRN: HCPCS | Performed by: INTERNAL MEDICINE

## 2020-12-01 PROCEDURE — G8753 SYS BP > OR = 140: HCPCS | Performed by: INTERNAL MEDICINE

## 2020-12-01 PROCEDURE — G8419 CALC BMI OUT NRM PARAM NOF/U: HCPCS | Performed by: INTERNAL MEDICINE

## 2020-12-01 PROCEDURE — G8754 DIAS BP LESS 90: HCPCS | Performed by: INTERNAL MEDICINE

## 2020-12-01 PROCEDURE — 1101F PT FALLS ASSESS-DOCD LE1/YR: CPT | Performed by: INTERNAL MEDICINE

## 2020-12-01 PROCEDURE — G8427 DOCREV CUR MEDS BY ELIG CLIN: HCPCS | Performed by: INTERNAL MEDICINE

## 2020-12-01 PROCEDURE — G8432 DEP SCR NOT DOC, RNG: HCPCS | Performed by: INTERNAL MEDICINE

## 2020-12-01 PROCEDURE — 99214 OFFICE O/P EST MOD 30 MIN: CPT | Performed by: INTERNAL MEDICINE

## 2020-12-01 PROCEDURE — G0463 HOSPITAL OUTPT CLINIC VISIT: HCPCS | Performed by: INTERNAL MEDICINE

## 2020-12-01 NOTE — PATIENT INSTRUCTIONS
You will be scheduled for nuclear stress testing after your appointment today. Wear comfortable clothing (shorts or pants with a shirt or blouse- no underwire bras) and walking or athletic shoes. Do not eat or drink anything, except water, for at least 2 hours prior to your appointment. Avoid tobacco products for at least 6 hours prior to your test.    Do not eat or drink anything containing caffeine, including but not limited to the following: chocolate, regular and decaffeinated coffee, soft drinks, or tea for at least 24 hours prior to your test.    Do not hold your scheduled medications prior to your test. If you are a diabetic, please ask your physician how to adjust your food and insulin prior to your test. Please bring all medications you are currently taking. You will need to inform our office if you are pregnant, nursing, or think you may be pregnant. Your test will be performed on a 1 day protocol. This is determined by your height, weight, and other risk factors. For a 2 day test, please allow for 2 hours in the office each day. For a 1 day test, please allow for 4 hours in the office that day. The radioactive isotope used for your testing is different from any of the dyes that are commonly used in x-ray procedures, and is ordered specially for your test. Please call to cancel or reschedule your appointment at least 24 hours prior to your scheduled appointment to avoid being billed for the expensive isotope.

## 2020-12-01 NOTE — PROGRESS NOTES
Cardiac Electrophysiology Office Note        Subjective:      Lilliana Johnston is a 78 y.o. male patient who is seen for follow up of persistent atrial fibrillation & typical atrial flutter, is s/p ablation on 01/08/2020. Post ablation holter this month showed no AF. Not currently on medication for rate or rhythm control. BP at home normal per him  HR cannot increase and sometimes below 60 bpm per him  He is tired and has SAVAGE  No chest pain     Anticoagulated with Eliquis, denies bleeding issues. Previous:  Holter (04/2020): Sinus rhtyhm  bpm, avg 67 bpm.  1st degree AVB. Rare 3 PACs, rare 1 PVCs. Echo 2019  Calculated left ventricular ejection fraction is 50%. Biplane method used to measure ejection fraction. No regional wall motion abnormality noted. · Left Atrium: Moderately dilated left atrium. · Mitral Valve: Mild mitral valve regurgitation is present. Chest CTA in 2018 and 2019 showed LAD and LCx calcification     Holter (09/20/2018): SB/NSR with 1st degree AVB, RBBB, occasional PVCs.     MAT (05/23/2018): LVEF 55-60%, no RWMA. LA mod dilated. RA mild to mod dilated. Mod MR.     Echo (06/16/2017): LVEF 50-55%, no RWMA. Mod ATILIO. Mod MR. Mod TR.     Followed by Dr. Tish Parks.         Problem List  Date Reviewed: 12/1/2020          Codes Class Noted    persistent atrial fibrillation ICD-10-CM: I48.91  ICD-9-CM: 427.31  5/23/2018        Atrial fibrillation ([facility]) ICD-10-CM: I48.91  ICD-9-CM: 427.31  1/8/2020        Typical atrial flutter ([facility]) ICD-10-CM: I48.3  ICD-9-CM: 427.32  1/8/2020        Status post catheter ablation of atrial flutter ICD-10-CM: Z98.890  ICD-9-CM: V45.89  1/8/2020    Overview Signed 1/8/2020  6:07 PM by Jaime Johnston MD     1/8/2020             S/P ablation of atrial fibrillation ICD-10-CM: Z98.890, Z86.79  ICD-9-CM: V45.89  5/23/2018    Overview Signed 5/23/2018  5:26 PM by Jaime Johnston MD     5/23/2018             Persistent atrial fibrillation ([facility]) ICD-10-CM: I48.19  ICD-9-CM: 427.31  5/23/2018        Advanced care planning/counseling discussion ICD-10-CM: Z71.89  ICD-9-CM: V65.49  4/18/2017    Overview Signed 4/18/2017  3:51 PM by Scarlett Ramirez MD     Asked pt to bring in             Thrombocytopenia [facility]) ICD-10-CM: D69.6  ICD-9-CM: 287.5  3/8/2016        Advance care planning ICD-10-CM: Z71.89  ICD-9-CM: V65.49  3/8/2016    Overview Signed 3/8/2016  9:04 AM by Scarlett Ramirez MD     Pt has one             Acne rosacea ICD-10-CM: L71.9  ICD-9-CM: 695.3  6/1/2010        Hypercholesteremia ICD-10-CM: E78.00  ICD-9-CM: 272.0  6/1/2010        HTN (hypertension) ICD-10-CM: I10  ICD-9-CM: 401.9  6/1/2010              Current Outpatient Medications   Medication Sig Dispense Refill    Eliquis 5 mg tablet TAKE 1 TABLET TWICE A  Tab 1    lisinopriL (PRINIVIL, ZESTRIL) 20 mg tablet TAKE 1 TABLET BY MOUTH DAILY 90 Tab 1    furosemide (LASIX) 20 mg tablet TAKE 1 TABLET BY MOUTH DAILY 30 Tab 5     Allergies   Allergen Reactions    Hydrochlorothiazide Rash    Pcn [Penicillins] Other (comments)     MOUTH SORE     Past Medical History:   Diagnosis Date    Acne rosacea 6/1/2010    HTN (hypertension) 6/1/2010    Hypercholesteremia 6/1/2010     No past surgical history   Family History   Problem Relation Age of Onset    Lung Disease Father      Social History     Tobacco Use    Smoking status: Former Smoker    Smokeless tobacco: Never Used    Tobacco comment: 1968   Substance Use Topics    Alcohol use: Yes     Alcohol/week: 8.0 standard drinks     Types: 8 Cans of beer per week      Review of Systems:   Constitutional: Negative for fever, chills, weight loss + fatigue  HEENT: Negative for nosebleeds, vision changes. Respiratory: Negative for cough, hemoptysis, sputum production, and wheezing.    Cardiovascular: Negative for chest pain, no orthopnea, claudication, no syncope, and PND.  + chronic stable bilateral ankle edema.  + SAVAGE  Gastrointestinal: Negative for nausea, vomiting, diarrhea, constipation, blood in stool and melena. Genitourinary: Negative for dysuria, and hematuria. Musculoskeletal: Negative for myalgias   Skin: Negative for rash. Heme: Does not bleed   Neurological: Negative for speech change and focal weakness     Objective:   Nursing note and vitals reviewed. Constitutional: well-developed and well-nourished. No distress. Head: Normocephalic and atraumatic. Eyes: Pupils are equal, round   Neck: Neck supple. No JVD present. Cardiovascular: Normal rate, regular rhythm and normal heart sounds. Exam reveals no gallop and no friction rub. No murmur heard. Pulmonary/Chest: Effort normal and breath sounds normal. No wheezes. Abdominal: Soft, no rebound. Musculoskeletal: no edema and no tenderness. Neurological: alert, oriented. Skin: Skin is warm and dry, not diaphoretic. Psychiatric: normal mood and affect. Behavior is normal. Judgment and thought content normal.     Assessment/Plan:       ICD-10-CM ICD-9-CM    1. Other persistent atrial fibrillation (HCC)  I48.19 427.31 NOVEL CORONAVIRUS (COVID-19)      NUCLEAR CARDIAC STRESS TEST   2. Typical atrial flutter ([facility])  I48.3 427.32    3. S/P ablation of atrial fibrillation  Z98.890 V45.89     Z86.79     4. Status post catheter ablation of atrial flutter  Z98.890 V45.89    5. Essential hypertension  I10 401.9    6. RBBB  I45.10 426.4    7. First degree AV block  I44.0 426.11    8. SAVAGE (dyspnea on exertion)  R06.00 786.09 NOVEL CORONAVIRUS (COVID-19)      NUCLEAR CARDIAC STRESS TEST         Mr. Garcia has done well post AF/AFL ablation, has not had known recurrent PAF or AFL. Last echo 2019 showed low normal LVEF with mod LA dilation & mild MR. BP reportedly well controlled at home as he showed me 120-130 mmHg and HR 60 bpm.  Continue lisinopril as previously prescribed. Continue Eliquis 5 mg po bid for embolic CVA prophylaxis.      SAVAGE could be from chronotropic incompetence or ischemia (abnormal CT scan with calcified LAD and LCx)  He agrees with exercise nuclear stress test    EP clinic follow up in 6 months    Future Appointments   Date Time Provider Sancho Harmon   12/29/2020  9:00 AM TTIA ALEXANDER AMB   6/1/2021  8:20 AM MD TIFFANY Collier BS AMB       Thank you for involving me in this patient's care and please call with further concerns or questions. Evelyn Petersen M.D.   Electrophysiology/Cardiology  [facility] [place]  [address], [address] , [address]  [facility], [address]                             [address] [place] [address]  [phone]

## 2020-12-22 ENCOUNTER — TELEPHONE (OUTPATIENT)
Dept: CARDIOLOGY CLINIC | Age: 80
End: 2020-12-22

## 2020-12-22 NOTE — TELEPHONE ENCOUNTER
Patient scheduled for a nuclear stress test on 12/29/20. Patient had negative COVID-19 test at [facility] on 12/22/20.  Will send results to scanning     [employer] message sent to patient notifying we received test results

## 2020-12-28 ENCOUNTER — TELEPHONE (OUTPATIENT)
Dept: CARDIOLOGY CLINIC | Age: 80
End: 2020-12-28

## 2020-12-29 ENCOUNTER — ANCILLARY PROCEDURE (OUTPATIENT)
Dept: CARDIOLOGY CLINIC | Age: 80
End: 2020-12-29
Payer: MEDICARE

## 2020-12-29 VITALS
WEIGHT: 186 LBS | SYSTOLIC BLOOD PRESSURE: 134 MMHG | DIASTOLIC BLOOD PRESSURE: 68 MMHG | BODY MASS INDEX: 26.63 KG/M2 | HEIGHT: 70 IN

## 2020-12-29 DIAGNOSIS — I10 ESSENTIAL HYPERTENSION: ICD-10-CM

## 2020-12-29 DIAGNOSIS — E78.00 HYPERCHOLESTEREMIA: ICD-10-CM

## 2020-12-29 DIAGNOSIS — I48.3 TYPICAL ATRIAL FLUTTER (HCC): ICD-10-CM

## 2020-12-29 DIAGNOSIS — I48.19 PERSISTENT ATRIAL FIBRILLATION (HCC): ICD-10-CM

## 2020-12-29 DIAGNOSIS — R06.09 DOE (DYSPNEA ON EXERTION): ICD-10-CM

## 2020-12-29 PROCEDURE — 93016 CV STRESS TEST SUPVJ ONLY: CPT | Performed by: INTERNAL MEDICINE

## 2020-12-29 PROCEDURE — 78452 HT MUSCLE IMAGE SPECT MULT: CPT | Performed by: INTERNAL MEDICINE

## 2020-12-29 PROCEDURE — 93018 CV STRESS TEST I&R ONLY: CPT | Performed by: INTERNAL MEDICINE

## 2020-12-29 PROCEDURE — A9500 TC99M SESTAMIBI: HCPCS | Performed by: INTERNAL MEDICINE

## 2020-12-29 PROCEDURE — 93017 CV STRESS TEST TRACING ONLY: CPT | Performed by: INTERNAL MEDICINE

## 2020-12-29 RX ORDER — TETRAKIS(2-METHOXYISOBUTYLISOCYANIDE)COPPER(I) TETRAFLUOROBORATE 1 MG/ML
30 INJECTION, POWDER, LYOPHILIZED, FOR SOLUTION INTRAVENOUS ONCE
Status: COMPLETED | OUTPATIENT
Start: 2020-12-29 | End: 2020-12-29

## 2020-12-29 RX ORDER — TETRAKIS(2-METHOXYISOBUTYLISOCYANIDE)COPPER(I) TETRAFLUOROBORATE 1 MG/ML
10 INJECTION, POWDER, LYOPHILIZED, FOR SOLUTION INTRAVENOUS ONCE
Status: COMPLETED | OUTPATIENT
Start: 2020-12-29 | End: 2020-12-29

## 2020-12-29 RX ADMIN — TECHNETIUM TC 99M SESTAMIBI 25.5 MILLICURIE: 1 INJECTION INTRAVENOUS at 10:55

## 2020-12-29 RX ADMIN — TETRAKIS(2-METHOXYISOBUTYLISOCYANIDE)COPPER(I) TETRAFLUOROBORATE 8.4 MILLICURIE: 1 INJECTION, POWDER, LYOPHILIZED, FOR SOLUTION INTRAVENOUS at 09:20

## 2020-12-30 ENCOUNTER — TELEPHONE (OUTPATIENT)
Dept: CARDIOLOGY CLINIC | Age: 80
End: 2020-12-30

## 2020-12-30 LAB
STRESS ANGINA INDEX: 0
STRESS BASELINE DIAS BP: 68 MMHG
STRESS BASELINE HR: 77 BPM
STRESS BASELINE SYS BP: 134 MMHG
STRESS ESTIMATED WORKLOAD: 5 METS
STRESS EXERCISE DUR MIN: NORMAL MIN:SEC
STRESS O2 SAT PEAK: 100 %
STRESS O2 SAT REST: 98 %
STRESS PEAK DIAS BP: 70 MMHG
STRESS PEAK SYS BP: 148 MMHG
STRESS PERCENT HR ACHIEVED: 94 %
STRESS POST PEAK HR: 130 BPM
STRESS RATE PRESSURE PRODUCT: NORMAL BPM*MMHG
STRESS SR DUKE TREADMILL SCORE: 3
STRESS ST DEPRESSION: 0 MM
STRESS ST ELEVATION: 0 MM
STRESS TARGET HR: 138 BPM

## 2020-12-30 NOTE — TELEPHONE ENCOUNTER
----- Message from Haresh Balderrama NP sent at 12/30/2020 10:23 AM EST -----  Patient exercised x 3 minutes, had HR  bpm. No indication of ischemia. Discussed with Dr. Nilson Bianchi. No change in treatment or follow up, but he does recommend that patient try to be more active, suspects deconditioning.

## 2020-12-30 NOTE — PROGRESS NOTES
Patient exercised x 3 minutes, had HR  bpm. No indication of ischemia. Discussed with Dr. Tana Moy. No change in treatment or follow up, but he does recommend that patient try to be more active, suspects deconditioning.

## 2021-01-04 RX ORDER — FUROSEMIDE 20 MG/1
TABLET ORAL
Qty: 30 TAB | Refills: 5 | Status: SHIPPED | OUTPATIENT
Start: 2021-01-04 | End: 2021-07-19

## 2021-01-04 NOTE — TELEPHONE ENCOUNTER
Received refill request for furosemide 20 mg tabs. Last Cr elevated in 02/2020. Recommend repeat BMP. Refill authorized.     Future Appointments   Date Time Provider Sancho Eden   6/1/2021  8:20 AM MD TIFFANY Rodrigues AMB

## 2021-01-18 ENCOUNTER — TELEPHONE (OUTPATIENT)
Dept: CARDIOLOGY CLINIC | Age: 81
End: 2021-01-18

## 2021-01-18 DIAGNOSIS — R06.09 DOE (DYSPNEA ON EXERTION): Primary | ICD-10-CM

## 2021-01-18 DIAGNOSIS — I48.19 PERSISTENT ATRIAL FIBRILLATION (HCC): ICD-10-CM

## 2021-01-18 NOTE — TELEPHONE ENCOUNTER
----- Message from Duane Bouchard, RN sent at 1/18/2021  8:18 AM EST -----  Regarding: FW:Furosemide (Lasix) Refill  Contact: [phone]    ----- Message -----  From: Graciela Moore  Sent: 1/17/2021  12:03 PM EST  To: Ezio Osborne [facility]  Subject: RE:Furosemide (Lasix) Refill                     Good morning:  Re:  Lab slip to check kidney function  I'll  the lab slip at your office o/a 18 January, if this is OK! Thank you and KO Suh for your perception about this lab test.  Sorry about my delay in answering e-mail-didn't see the message.        Stacy Kincaid

## 2021-01-20 ENCOUNTER — TELEPHONE (OUTPATIENT)
Dept: CARDIOLOGY CLINIC | Age: 81
End: 2021-01-20

## 2021-04-06 RX ORDER — LISINOPRIL 20 MG/1
20 TABLET ORAL DAILY
Qty: 90 TAB | Refills: 1 | Status: SHIPPED | OUTPATIENT
Start: 2021-04-06 | End: 2021-10-15 | Stop reason: SDUPTHER

## 2021-04-06 NOTE — TELEPHONE ENCOUNTER
Cardiologist: Dr. Sulema Gimenez    Last appt: 12/1/2020  Future Appointments   Date Time Provider Sancho Harmon   6/1/2021  8:20 AM MD TIFFANY Crawford BS AMB       Requested Prescriptions     Signed Prescriptions Disp Refills    lisinopriL (PRINIVIL, ZESTRIL) 20 mg tablet 90 Tab 1     Sig: Take 1 Tab by mouth daily. Authorizing Provider: THEA KAMINSKI     Ordering User: LISA DEL RIO         Refills VO per Dr. Sulema Gimenez.

## 2021-05-17 ENCOUNTER — OFFICE VISIT (OUTPATIENT)
Dept: FAMILY MEDICINE CLINIC | Age: 81
End: 2021-05-17
Payer: MEDICARE

## 2021-05-17 VITALS
OXYGEN SATURATION: 99 % | DIASTOLIC BLOOD PRESSURE: 75 MMHG | HEART RATE: 83 BPM | RESPIRATION RATE: 18 BRPM | TEMPERATURE: 98.2 F | SYSTOLIC BLOOD PRESSURE: 135 MMHG | BODY MASS INDEX: 26.2 KG/M2 | HEIGHT: 70 IN | WEIGHT: 183 LBS

## 2021-05-17 DIAGNOSIS — I48.3 TYPICAL ATRIAL FLUTTER (HCC): ICD-10-CM

## 2021-05-17 DIAGNOSIS — N18.30 STAGE 3 CHRONIC KIDNEY DISEASE, UNSPECIFIED WHETHER STAGE 3A OR 3B CKD (HCC): ICD-10-CM

## 2021-05-17 DIAGNOSIS — Z00.00 ROUTINE GENERAL MEDICAL EXAMINATION AT A HEALTH CARE FACILITY: Primary | ICD-10-CM

## 2021-05-17 DIAGNOSIS — R73.9 ELEVATED BLOOD SUGAR: ICD-10-CM

## 2021-05-17 DIAGNOSIS — D69.6 THROMBOCYTOPENIA (HCC): ICD-10-CM

## 2021-05-17 DIAGNOSIS — I10 ESSENTIAL HYPERTENSION: ICD-10-CM

## 2021-05-17 PROCEDURE — G0463 HOSPITAL OUTPT CLINIC VISIT: HCPCS | Performed by: FAMILY MEDICINE

## 2021-05-17 PROCEDURE — G8754 DIAS BP LESS 90: HCPCS | Performed by: FAMILY MEDICINE

## 2021-05-17 PROCEDURE — G8752 SYS BP LESS 140: HCPCS | Performed by: FAMILY MEDICINE

## 2021-05-17 PROCEDURE — 1101F PT FALLS ASSESS-DOCD LE1/YR: CPT | Performed by: FAMILY MEDICINE

## 2021-05-17 PROCEDURE — G8427 DOCREV CUR MEDS BY ELIG CLIN: HCPCS | Performed by: FAMILY MEDICINE

## 2021-05-17 PROCEDURE — G8510 SCR DEP NEG, NO PLAN REQD: HCPCS | Performed by: FAMILY MEDICINE

## 2021-05-17 PROCEDURE — G8536 NO DOC ELDER MAL SCRN: HCPCS | Performed by: FAMILY MEDICINE

## 2021-05-17 PROCEDURE — G0439 PPPS, SUBSEQ VISIT: HCPCS | Performed by: FAMILY MEDICINE

## 2021-05-17 PROCEDURE — 99214 OFFICE O/P EST MOD 30 MIN: CPT | Performed by: FAMILY MEDICINE

## 2021-05-17 PROCEDURE — G8419 CALC BMI OUT NRM PARAM NOF/U: HCPCS | Performed by: FAMILY MEDICINE

## 2021-05-17 NOTE — PROGRESS NOTES
Chief Complaint   Patient presents with    Annual Wellness Visit    Labs     Patient presents in office today for AMW visit and fasting labs. No concerns. 1. Have you been to the ER, urgent care clinic since your last visit? Hospitalized since your last visit? No    2. Have you seen or consulted any other health care providers outside of the [address] [place] [address] since your last visit? Include any pap smears or colon screening. No    Learning Assessment 4/18/2017   PRIMARY LEARNER Patient   HIGHEST LEVEL OF EDUCATION - PRIMARY LEARNER  GRADUATED HIGH SCHOOL OR GED   BARRIERS PRIMARY LEARNER NONE   CO-LEARNER CAREGIVER No   PRIMARY LANGUAGE ENGLISH   LEARNER PREFERENCE PRIMARY DEMONSTRATION   ANSWERED BY pat   RELATIONSHIP SELF       Medicare Wellness Exam:    Chief Complaint   Patient presents with    Annual Wellness Visit    Labs     he is a 78y.o. year old male who presents for evaluation for their Medicare Wellness Visit. Zaynab Bussing is completed and assessed=yes  Depression Screen is completed and assessed=yes  Medication list reviewed and adjusted for accuracy=yes  Immunizations reviewed and updated=yes  Health/Preventative Screenings reviewed and updated=yes  ADL Functions reviewed=yes    Patient Active Problem List    Diagnosis    persistent atrial fibrillation    Atrial fibrillation (HCC)    Typical atrial flutter (HCC)    Status post catheter ablation of atrial flutter     1/8/2020      S/P ablation of atrial fibrillation     5/23/2018      Persistent atrial fibrillation [facility] [address]    Advanced care planning/counseling discussion     Asked pt to bring in      Thrombocytopenia ([facility] [address]    Advance care planning     Pt has one      Acne rosacea    Hypercholesteremia    HTN (hypertension)       Reviewed PmHx, RxHx, FmHx, SocHx, AllgHx and updated and dated in the chart.     Review of Systems - negative except as listed above in the HPI    Objective:     Vitals:    05/17/21 1009   BP: 135/75 Pulse: 83   Resp: 18   Temp: 98.2 °F (36.8 °C)   TempSrc: Oral   SpO2: 99%   Weight: 183 lb (83 kg)   Height: 5' 10\" (1.778 m)       Assessment/ Plan:   Diagnoses and all orders for this visit:    1. Routine general medical examination at a health care facility  -see below  -has lw    2. Typical atrial flutter (HCC)  -     TSH 3RD GENERATION; Future  -     METABOLIC PANEL, COMPREHENSIVE; Future  -     LIPID PANEL; Future  -     PSA, DIAGNOSTIC (PROSTATE SPECIFIC AG); Future  -     HEMOGLOBIN A1C WITH EAG; Future  -no palp    3. Thrombocytopenia (HCC)  -     CBC WITH AUTOMATED DIFF; Future    4. Essential hypertension  -     METABOLIC PANEL, COMPREHENSIVE; Future  -     LIPID PANEL; Future  -at goal    5. Elevated blood sugar  -     METABOLIC PANEL, COMPREHENSIVE; Future  -     HEMOGLOBIN A1C WITH EAG; Future  Lab Results   Component Value Date/Time    Hemoglobin A1c 6.0 (H) 02/11/2020 10:59 AM       6. Stage 3 chronic kidney disease, unspecified whether stage 3a or 3b CKD (HCC)  -     METABOLIC PANEL, COMPREHENSIVE;  Future  Lab Results   Component Value Date/Time    Creatinine (POC) 1.5 (H) 11/19/2019 11:20 AM    Creatinine 1.35 (H) 01/19/2021 10:20 AM     -stable and on ace         -Pain evaluation performed in office  -Cognitive Screen performed in office  -Depression Screen, Fall risks (by up and go test)  and ADL functionality were addressed  -Medication list updated and reviewed for any changes   -A comprehensive review of medical issues and a plan was formulated  -End of life planning was addressed with pt   -Health Screenings for preventions were addressed and a plan was formulated  -Shingles Vaccine was recommended  -Discussed with patient cancer risk factors and appropriate screenings for age  -Patient evaluated for colonoscopy and referred if needed per screeing criteria  -Labs from previous visits were discussed with patient   -Discussed with patient diet and exercise and formulated a plan as needed  -An Advanced care plan was developed with the patient.  -Alcohol screening performed and was negative    -    I have discussed the diagnosis with the patient and the intended plan as seen in the above orders. The patient understands and agrees with the plan. The patient has received an after-visit summary and questions were answered concerning future plans. Medication Side Effects and Warnings were discussed with patien  Patient Labs were reviewed and or requested  Patient Past Records were reviewed and or requested    There are no Patient Instructions on file for this visit.       Chente Manuel M.D.

## 2021-05-18 ENCOUNTER — TELEPHONE (OUTPATIENT)
Dept: FAMILY MEDICINE CLINIC | Age: 81
End: 2021-05-18

## 2021-05-18 LAB
ALBUMIN SERPL-MCNC: 3.9 G/DL (ref 3.5–5)
ALBUMIN/GLOB SERPL: 1.3 {RATIO} (ref 1.1–2.2)
ALP SERPL-CCNC: 78 U/L (ref 45–117)
ALT SERPL-CCNC: 18 U/L (ref 12–78)
ANION GAP SERPL CALC-SCNC: 5 MMOL/L (ref 5–15)
AST SERPL-CCNC: 22 U/L (ref 15–37)
BASOPHILS # BLD: 0.1 K/UL (ref 0–0.1)
BASOPHILS NFR BLD: 1 % (ref 0–1)
BILIRUB SERPL-MCNC: 0.5 MG/DL (ref 0.2–1)
BUN SERPL-MCNC: 17 MG/DL (ref 6–20)
BUN/CREAT SERPL: 13 (ref 12–20)
CALCIUM SERPL-MCNC: 9 MG/DL (ref 8.5–10.1)
CHLORIDE SERPL-SCNC: 105 MMOL/L (ref 97–108)
CHOLEST SERPL-MCNC: 159 MG/DL
CO2 SERPL-SCNC: 26 MMOL/L (ref 21–32)
CREAT SERPL-MCNC: 1.33 MG/DL (ref 0.7–1.3)
DIFFERENTIAL METHOD BLD: ABNORMAL
EOSINOPHIL # BLD: 0.3 K/UL (ref 0–0.4)
EOSINOPHIL NFR BLD: 5 % (ref 0–7)
ERYTHROCYTE [DISTWIDTH] IN BLOOD BY AUTOMATED COUNT: 18.5 % (ref 11.5–14.5)
EST. AVERAGE GLUCOSE BLD GHB EST-MCNC: 114 MG/DL
GLOBULIN SER CALC-MCNC: 3 G/DL (ref 2–4)
GLUCOSE SERPL-MCNC: 87 MG/DL (ref 65–100)
HBA1C MFR BLD: 5.6 % (ref 4–5.6)
HCT VFR BLD AUTO: 28.5 % (ref 36.6–50.3)
HDLC SERPL-MCNC: 54 MG/DL
HDLC SERPL: 2.9 {RATIO} (ref 0–5)
HGB BLD-MCNC: 7.4 G/DL (ref 12.1–17)
IMM GRANULOCYTES # BLD AUTO: 0 K/UL (ref 0–0.04)
IMM GRANULOCYTES NFR BLD AUTO: 0 % (ref 0–0.5)
LDLC SERPL CALC-MCNC: 90.6 MG/DL (ref 0–100)
LYMPHOCYTES # BLD: 0.8 K/UL (ref 0.8–3.5)
LYMPHOCYTES NFR BLD: 15 % (ref 12–49)
MCH RBC QN AUTO: 17 PG (ref 26–34)
MCHC RBC AUTO-ENTMCNC: 26 G/DL (ref 30–36.5)
MCV RBC AUTO: 65.4 FL (ref 80–99)
MONOCYTES # BLD: 0.7 K/UL (ref 0–1)
MONOCYTES NFR BLD: 13 % (ref 5–13)
NEUTS SEG # BLD: 3.4 K/UL (ref 1.8–8)
NEUTS SEG NFR BLD: 66 % (ref 32–75)
NRBC # BLD: 0 K/UL (ref 0–0.01)
NRBC BLD-RTO: 0 PER 100 WBC
PLATELET # BLD AUTO: 189 K/UL (ref 150–400)
POTASSIUM SERPL-SCNC: 4.4 MMOL/L (ref 3.5–5.1)
PROT SERPL-MCNC: 6.9 G/DL (ref 6.4–8.2)
PSA SERPL-MCNC: 1.4 NG/ML (ref 0.01–4)
RBC # BLD AUTO: 4.36 M/UL (ref 4.1–5.7)
RBC MORPH BLD: ABNORMAL
RBC MORPH BLD: ABNORMAL
SODIUM SERPL-SCNC: 136 MMOL/L (ref 136–145)
TRIGL SERPL-MCNC: 72 MG/DL (ref ?–150)
TSH SERPL DL<=0.05 MIU/L-ACNC: 1.14 UIU/ML (ref 0.36–3.74)
VLDLC SERPL CALC-MCNC: 14.4 MG/DL
WBC # BLD AUTO: 5.3 K/UL (ref 4.1–11.1)

## 2021-05-18 NOTE — PROGRESS NOTES
Pt notified as below per Dr. Ayaka Fraga. Verbalizes understanding & is agreeable to plan. Pt provided with contact info for [facility] and [facility].

## 2021-05-19 ENCOUNTER — TELEPHONE (OUTPATIENT)
Dept: CARDIOLOGY CLINIC | Age: 81
End: 2021-05-19

## 2021-05-19 RX ORDER — IRON POLYSACCHARIDE COMPLEX 150 MG
150 CAPSULE ORAL 2 TIMES DAILY
Qty: 60 CAPSULE | Refills: 3 | Status: SHIPPED | OUTPATIENT
Start: 2021-05-19

## 2021-05-19 NOTE — TELEPHONE ENCOUNTER
Returned patient call. Verified patient by two identifiers. Rescheduled 6/1/2021 appointment with Dr. Claudia Moreno out to 8/3/2021, as requested with patient. Patient verbalized understanding and had no additional questions.     Future Appointments   Date Time Provider Sancho Harmon   8/3/2021 11:40 AM MD TIFFANY Drummond AMB

## 2021-07-19 RX ORDER — FUROSEMIDE 20 MG/1
TABLET ORAL
Qty: 30 TABLET | Refills: 5 | Status: SHIPPED | OUTPATIENT
Start: 2021-07-19 | End: 2022-01-18 | Stop reason: SDUPTHER

## 2021-07-19 NOTE — TELEPHONE ENCOUNTER
Received refill request for furosemide 20 mg po tabs. Last Cr mildly elevated but stable. K, Na WNL. Refill authorized.     Future Appointments   Date Time Provider Sancho Harmon   8/3/2021 11:40 AM MD TIFFANY Shelley AMB

## 2021-07-31 RX ORDER — APIXABAN 5 MG/1
TABLET, FILM COATED ORAL
Qty: 180 TABLET | Refills: 1 | Status: SHIPPED | OUTPATIENT
Start: 2021-07-31 | End: 2022-07-26

## 2021-08-03 ENCOUNTER — OFFICE VISIT (OUTPATIENT)
Dept: CARDIOLOGY CLINIC | Age: 81
End: 2021-08-03
Payer: MEDICARE

## 2021-08-03 VITALS
HEIGHT: 70 IN | HEART RATE: 80 BPM | WEIGHT: 176 LBS | BODY MASS INDEX: 25.2 KG/M2 | OXYGEN SATURATION: 97 % | DIASTOLIC BLOOD PRESSURE: 80 MMHG | RESPIRATION RATE: 14 BRPM | SYSTOLIC BLOOD PRESSURE: 140 MMHG

## 2021-08-03 DIAGNOSIS — Z98.890 STATUS POST CATHETER ABLATION OF ATRIAL FLUTTER: ICD-10-CM

## 2021-08-03 DIAGNOSIS — Z98.890 S/P ABLATION OF ATRIAL FIBRILLATION: ICD-10-CM

## 2021-08-03 DIAGNOSIS — I48.19 PERSISTENT ATRIAL FIBRILLATION (HCC): Primary | ICD-10-CM

## 2021-08-03 DIAGNOSIS — I45.10 RBBB: ICD-10-CM

## 2021-08-03 DIAGNOSIS — Z86.79 S/P ABLATION OF ATRIAL FIBRILLATION: ICD-10-CM

## 2021-08-03 DIAGNOSIS — I49.3 PVC (PREMATURE VENTRICULAR CONTRACTION): ICD-10-CM

## 2021-08-03 DIAGNOSIS — I48.3 TYPICAL ATRIAL FLUTTER (HCC): ICD-10-CM

## 2021-08-03 DIAGNOSIS — I10 ESSENTIAL HYPERTENSION: ICD-10-CM

## 2021-08-03 DIAGNOSIS — I34.0 MITRAL VALVE INSUFFICIENCY, UNSPECIFIED ETIOLOGY: ICD-10-CM

## 2021-08-03 DIAGNOSIS — I44.0 FIRST DEGREE AV BLOCK: ICD-10-CM

## 2021-08-03 PROBLEM — I48.91 A-FIB (HCC): Status: RESOLVED | Noted: 2018-05-23 | Resolved: 2021-08-03

## 2021-08-03 PROCEDURE — G8419 CALC BMI OUT NRM PARAM NOF/U: HCPCS | Performed by: INTERNAL MEDICINE

## 2021-08-03 PROCEDURE — G8510 SCR DEP NEG, NO PLAN REQD: HCPCS | Performed by: INTERNAL MEDICINE

## 2021-08-03 PROCEDURE — G8536 NO DOC ELDER MAL SCRN: HCPCS | Performed by: INTERNAL MEDICINE

## 2021-08-03 PROCEDURE — G0463 HOSPITAL OUTPT CLINIC VISIT: HCPCS | Performed by: INTERNAL MEDICINE

## 2021-08-03 PROCEDURE — 99214 OFFICE O/P EST MOD 30 MIN: CPT | Performed by: INTERNAL MEDICINE

## 2021-08-03 PROCEDURE — G8753 SYS BP > OR = 140: HCPCS | Performed by: INTERNAL MEDICINE

## 2021-08-03 PROCEDURE — G8754 DIAS BP LESS 90: HCPCS | Performed by: INTERNAL MEDICINE

## 2021-08-03 PROCEDURE — 93010 ELECTROCARDIOGRAM REPORT: CPT | Performed by: INTERNAL MEDICINE

## 2021-08-03 PROCEDURE — 1101F PT FALLS ASSESS-DOCD LE1/YR: CPT | Performed by: INTERNAL MEDICINE

## 2021-08-03 PROCEDURE — G8427 DOCREV CUR MEDS BY ELIG CLIN: HCPCS | Performed by: INTERNAL MEDICINE

## 2021-08-03 PROCEDURE — 93005 ELECTROCARDIOGRAM TRACING: CPT | Performed by: INTERNAL MEDICINE

## 2021-08-05 NOTE — PROGRESS NOTES
Cardiac Electrophysiology Office Note        Subjective:      Danish Watson is a 78 y.o. male patient who is seen for follow up of persistent atrial fibrillation & typical atrial flutter, is s/p ablation on 01/08/2020. Post ablation holter this month showed no AF. Not currently on medication for rate or rhythm control. BP at home normal per him  No chest pain     Anticoagulated with Eliquis, denies bleeding issues. Previous:  Holter (04/2020): Sinus rhtyhm  bpm, avg 67 bpm.  1st degree AVB. Rare 3 PACs, rare 1 PVCs. Echo 2019  Calculated left ventricular ejection fraction is 50%. Biplane method used to measure ejection fraction. No regional wall motion abnormality noted. · Left Atrium: Moderately dilated left atrium. · Mitral Valve: Mild mitral valve regurgitation is present. Chest CTA in 2018 and 2019 showed LAD and LCx calcification     Holter (09/20/2018): SB/NSR with 1st degree AVB, RBBB, occasional PVCs.     MAT (05/23/2018): LVEF 55-60%, no RWMA. LA mod dilated. RA mild to mod dilated. Mod MR.     Echo (06/16/2017): LVEF 50-55%, no RWMA. Mod ATILIO. Mod MR. Mod TR.     Followed by Dr. Rosalinda Nowak.         Problem List  Date Reviewed: 8/4/2021        Codes Class Noted    Atrial fibrillation [facility]) ICD-10-CM: I48.91  ICD-9-CM: 427.31  1/8/2020        Typical atrial flutter ([facility] [address] ICD-10-CM: I48.3  ICD-9-CM: 427.32  1/8/2020        Status post catheter ablation of atrial flutter ICD-10-CM: Z98.890  ICD-9-CM: V45.89  1/8/2020    Overview Signed 1/8/2020  6:07 PM by Matthew Torres MD     1/8/2020             S/P ablation of atrial fibrillation ICD-10-CM: Z98.890, Z86.79  ICD-9-CM: V45.89  5/23/2018    Overview Signed 5/23/2018  5:26 PM by Matthew Torres MD     5/23/2018             Persistent atrial fibrillation ([facility] [address] ICD-10-CM: I48.19  ICD-9-CM: 427.31  5/23/2018        Advanced care planning/counseling discussion ICD-10-CM: J19.66  ICD-9-CM: V65.49  4/18/2017    Overview Signed 4/18/2017  3:51 PM by Fortunato Henriquez MD     Asked pt to bring in             Thrombocytopenia [facility]) ICD-10-CM: D69.6  ICD-9-CM: 287.5  3/8/2016        Advance care planning ICD-10-CM: Z71.89  ICD-9-CM: V65.49  3/8/2016    Overview Signed 3/8/2016  9:04 AM by Fortunato Henriquez MD     Pt has one             Acne rosacea ICD-10-CM: L71.9  ICD-9-CM: 695.3  6/1/2010        Hypercholesteremia ICD-10-CM: E78.00  ICD-9-CM: 272.0  6/1/2010        HTN (hypertension) ICD-10-CM: I10  ICD-9-CM: 401.9  6/1/2010              Current Outpatient Medications   Medication Sig Dispense Refill    Eliquis 5 mg tablet TAKE 1 TABLET TWICE A  Tablet 1    furosemide (LASIX) 20 mg tablet TAKE 1 TABLET BY MOUTH DAILY 30 Tablet 5    iron polysaccharides (Nu-Iron) 150 mg iron capsule Take 1 Capsule by mouth two (2) times a day. 60 Capsule 3    lisinopriL (PRINIVIL, ZESTRIL) 20 mg tablet Take 1 Tab by mouth daily. 90 Tab 1     Allergies   Allergen Reactions    Hydrochlorothiazide Rash    Pcn [Penicillins] Other (comments)     MOUTH SORE     Past Medical History:   Diagnosis Date    Acne rosacea 6/1/2010    HTN (hypertension) 6/1/2010    Hypercholesteremia 6/1/2010     No past surgical history   Family History   Problem Relation Age of Onset    Lung Disease Father      Social History     Tobacco Use    Smoking status: Former Smoker    Smokeless tobacco: Never Used    Tobacco comment: 1968   Substance Use Topics    Alcohol use: Yes     Alcohol/week: 8.0 standard drinks     Types: 8 Cans of beer per week     Comment: occasional      Review of Systems:   Constitutional: Negative for fever, chills, weight loss,  fatigue  HEENT: Negative for nosebleeds, vision changes. Respiratory: Negative for cough, hemoptysis, sputum production, and wheezing. Cardiovascular: Negative for chest pain, no orthopnea, claudication, no syncope, and PND.      Gastrointestinal: Negative for nausea, vomiting, diarrhea, constipation, blood in stool and melena. Genitourinary: Negative for dysuria, and hematuria. Musculoskeletal: Negative for myalgias   Skin: Negative for rash. Heme: Does not bleed   Neurological: Negative for speech change and focal weakness     Objective:     Visit Vitals  BP (!) 140/80 (BP 1 Location: Left arm, BP Patient Position: Sitting, BP Cuff Size: Adult)   Pulse 80   Resp 14   Ht 5' 10\" (1.778 m)   Wt 176 lb (79.8 kg)   SpO2 97%   BMI 25.25 kg/m²       Constitutional: well-developed and well-nourished. No distress. Head: Normocephalic and atraumatic. Eyes: Pupils are equal, round   Neck: Neck supple. No JVD present. Cardiovascular: Normal rate, regular rhythm and normal heart sounds. Exam reveals no gallop and no friction rub. No murmur heard. Pulmonary/Chest: Effort normal and breath sounds normal. No wheezes. Abdominal: Soft, no rebound. Musculoskeletal: no edema and no tenderness. Neurological: alert, oriented. Skin: Skin is warm and dry, not diaphoretic. Psychiatric: normal mood and affect. Behavior is normal. Judgment and thought content normal.     Assessment/Plan:       ICD-10-CM ICD-9-CM    1. Persistent atrial fibrillation (HCC)  I48.19 427.31 AMB POC EKG ROUTINE W/ 12 LEADS, INTER & REP      ECHO ADULT COMPLETE   2. Essential hypertension  I10 401.9    3. Typical atrial flutter (HCC)  I48.3 427.32    4. Status post catheter ablation of atrial flutter  Z98.890 V45.89    5. S/P ablation of atrial fibrillation  Z98.890 V45.89     Z86.79     6. RBBB  I45.10 426.4    7. First degree AV block  I44.0 426.11    8. PVC (premature ventricular contraction)  I49.3 427.69    9. Mitral valve insufficiency, unspecified etiology  I34.0 424.0          Mr. Garcia has done well post AF/AFL ablation, has not had known recurrent PAF or AFL.      He is doing better than 7 months ago  Last echo 2019 showed low normal LVEF with mod LA dilation & mild MR.  ECG NSR first degree av block and RBBB  I recommend 2 D echo to follow up on LA size and MR severity    BP reportedly well controlled at home but a bit higher here    Continue Eliquis 5 mg po bid for embolic CVA prophylaxis. Abnormal CT scan with calcified LAD and LCx   Nuclear stress test 12/2020 Left ventricular perfusion is normal. Myocardial perfusion imaging supports a low risk stress test.  LVEF 73%  He thought he could have walked longer on treadmill but was stopped by stress nurse    EP clinic follow up in 6 months    Future Appointments   Date Time Provider Sancho Harmon   2/8/2022  1:00 PM ECHO, TITA SAM   2/8/2022  2:20 PM MD ITFFANY Sanchez AMB       Thank you for involving me in this patient's care and please call with further concerns or questions. Savannah Damon M.D.   Electrophysiology/Cardiology  [facility] [place]  [address] [facility], [facility] [address]  [address]  [phone]

## 2021-08-17 DIAGNOSIS — Z12.11 COLON CANCER SCREENING: Primary | ICD-10-CM

## 2021-09-01 ENCOUNTER — DOCUMENTATION ONLY (OUTPATIENT)
Dept: FAMILY MEDICINE CLINIC | Age: 81
End: 2021-09-01

## 2021-09-23 ENCOUNTER — TELEPHONE (OUTPATIENT)
Dept: CARDIOLOGY CLINIC | Age: 81
End: 2021-09-23

## 2021-09-23 NOTE — TELEPHONE ENCOUNTER
Faxed Instructions to hold Eliquis to [facility] at fax number [phone]. Fax confirmation received.

## 2021-10-15 RX ORDER — LISINOPRIL 20 MG/1
20 TABLET ORAL DAILY
Qty: 90 TABLET | Refills: 1 | Status: SHIPPED | OUTPATIENT
Start: 2021-10-15 | End: 2022-04-14 | Stop reason: SDUPTHER

## 2021-10-15 NOTE — TELEPHONE ENCOUNTER
Request for Lisinopril 10 mg daily. Last office visit 8/3/21, next office visit 2/8/22. Refills per verbal order from Dr. Christina Pedro.

## 2022-01-18 RX ORDER — FUROSEMIDE 20 MG/1
20 TABLET ORAL DAILY
Qty: 90 TABLET | Refills: 1 | Status: SHIPPED | OUTPATIENT
Start: 2022-01-18 | End: 2022-07-25

## 2022-02-03 NOTE — PROGRESS NOTES
Cardiac Electrophysiology Office Note      Subjective:      Elizabet Licea is a 78 y.o. male patient who presents for follow up of persistent AF & typical AFL, is s/p ablation on 01/08/2020. Post ablation holter showed no AF. Not currently on medication for rate or rhythm control, has 1st degree AVB & RBBB on last ECG in 08/2021. Echo pending today. Nuclear stress test in 12/2020 showed no ischemia. He denies chest pain or palpitations, states he's able to play pickleball & golf without difficulty. BP well controlled. Anticoagulated with Eliquis, denies bleeding issues. He did have significant iron deficiency anemia last year, had negative Cologuard. Received iron infusions & now on iron supplement. States this have improved, followed closely by Dr. Orville Aaron. Previous:  S/p ablation of persistent AF & typical AFL 01/08/2020. Chest CTA in 2018 and 2019 showed LAD and LCx calcification.     Followed by Dr. Joan Arita.         Problem List  Date Reviewed: 8/4/2021          Codes Class Noted    Atrial fibrillation [facility]) ICD-10-CM: I48.91  ICD-9-CM: 427.31  1/8/2020        Typical atrial flutter ([address] ICD-10-CM: I48.3  ICD-9-CM: 427.32  1/8/2020        Status post catheter ablation of atrial flutter ICD-10-CM: Z98.890  ICD-9-CM: V45.89  1/8/2020    Overview Signed 1/8/2020  6:07 PM by Liliya Marquez MD     1/8/2020             S/P ablation of atrial fibrillation ICD-10-CM: Z98.890, Z86.79  ICD-9-CM: V45.89  5/23/2018    Overview Signed 5/23/2018  5:26 PM by Liliya Marquez MD     5/23/2018             Persistent atrial fibrillation [facility]) ICD-10-CM: I48.19  ICD-9-CM: 427.31  5/23/2018        Advanced care planning/counseling discussion ICD-10-CM: Z71.89  ICD-9-CM: V65.49  4/18/2017    Overview Signed 4/18/2017  3:51 PM by Rome Zelaya MD     Asked pt to bring in             Thrombocytopenia [facility]) ICD-10-CM: D69.6  ICD-9-CM: 287.5  3/8/2016        Advance care planning ICD-10-CM: Z71.89  ICD-9-CM: V65.49  3/8/2016    Overview Signed 3/8/2016  9:04 AM by Cody Sherman MD     Pt has one             Acne rosacea ICD-10-CM: L71.9  ICD-9-CM: 695.3  6/1/2010        Hypercholesteremia ICD-10-CM: E78.00  ICD-9-CM: 272.0  6/1/2010        HTN (hypertension) ICD-10-CM: I10  ICD-9-CM: 401.9  6/1/2010              Current Outpatient Medications   Medication Sig Dispense Refill    furosemide (LASIX) 20 mg tablet Take 1 Tablet by mouth daily. 90 Tablet 1    lisinopriL (PRINIVIL, ZESTRIL) 20 mg tablet Take 1 Tablet by mouth daily. 90 Tablet 1    Eliquis 5 mg tablet TAKE 1 TABLET TWICE A  Tablet 1    iron polysaccharides (Nu-Iron) 150 mg iron capsule Take 1 Capsule by mouth two (2) times a day. 60 Capsule 3     Allergies   Allergen Reactions    Hydrochlorothiazide Rash    Pcn [Penicillins] Other (comments)     MOUTH SORE     Past Medical History:   Diagnosis Date    Acne rosacea 6/1/2010    HTN (hypertension) 6/1/2010    Hypercholesteremia 6/1/2010     No past surgical history   Family History   Problem Relation Age of Onset    Lung Disease Father      Social History     Tobacco Use    Smoking status: Former Smoker    Smokeless tobacco: Never Used    Tobacco comment: 1968   Substance Use Topics    Alcohol use: Yes     Alcohol/week: 8.0 standard drinks     Types: 8 Cans of beer per week     Comment: occasional      Review of Systems:   Constitutional: Negative for fever, chills, weight loss,  + occasional fatigue. HEENT: Negative for nosebleeds, vision changes. Respiratory: Negative for cough, hemoptysis, sputum production, and wheezing. Cardiovascular: Negative for chest pain, no orthopnea, claudication, no syncope, and PND. Gastrointestinal: Negative for nausea, vomiting, diarrhea, constipation, blood in stool and melena. Genitourinary: Negative for dysuria, and hematuria. Musculoskeletal: Negative for myalgias. Skin: Negative for rash.    Heme: Does not bleed Neurological: Negative for speech change and focal weakness. Objective:     Visit Vitals  /78 (BP 1 Location: Left arm, BP Patient Position: Sitting, BP Cuff Size: Adult)   Pulse 76   Resp 18   Ht 5' 10\" (1.778 m)   SpO2 100%   BMI 25.40 kg/m²       Constitutional: Well-developed and well-nourished. No distress. Head: Normocephalic and atraumatic. Eyes: Pupils are equal, round. Neck: Neck supple. No JVD present. Cardiovascular: Normal rate, regular rhythm and normal heart sounds. Exam reveals no gallop and no friction rub. No murmur heard. Pulmonary/Chest: Effort normal and breath sounds normal. No wheezes. Abdominal: Soft, no rebound. Musculoskeletal: Moves extremities independently. Vasc/lymphatic: No edema. Neurological: Alert, oriented. Skin: Skin is warm and dry, not diaphoretic. Psychiatric: Normal mood and affect. Behavior is normal. Judgment and thought content normal.     Assessment/Plan:     Imaging/Studies:  Nuclear stress (12/29/2020): LVEF 73%. Normal LV myocardial perfusion. Holter (04/2020): Sinus rhtyhm  bpm, avg 67 bpm.  1st degree AVB. Rare 3 PACs, rare 1 PVCs. Echo (10/17/2019): LVEF 50%, no RWMA, mild concentric LVH. Mod dilated LA. Mild MR. Holter (09/20/2018): SB/NSR with 1st degree AVB, RBBB, occasional PVCs.     MAT (05/23/2018): LVEF 55-60%, no RWMA. LA mod dilated. RA mild to mod dilated. Mod MR.         ICD-10-CM ICD-9-CM    1. Persistent atrial fibrillation (HCC)  I48.19 427.31    2. Typical atrial flutter (HCC)  I48.3 427.32    3. Essential hypertension  I10 401.9    4. Status post catheter ablation of atrial flutter  Z98.890 V45.89    5. S/P ablation of atrial fibrillation  Z98.890 V45.89     Z86.79     6. RBBB  I45.10 426.4    7. First degree AV block  I44.0 426.11    8. Mitral valve insufficiency, unspecified etiology  I34.0 424.0    9. Anticoagulated  Z79.01 V58.61        Persistent AF/typical AFL: S/p ablation in 01/2020. Post ablation holter showed no AF or AFL. No known recurrence. No longer on meds for rate/rhythm control. LA moderately enlarged on last echo in 2019; repeat echo pending today. Will call patient once Dr. Khadar Garvey has reviewed the images. CAD: LAD & LCx calcification noted on chest CT. Nuclear stress test in 12/2020 showed no ischemia. No angina. Not on a statin, LDL 90.6 in 05/2021. MR: Mild per echo in 2019, repeat echo pending today. RBBB: Noted on prior ECG. HTN: BP controlled. Continue current regimen as previously prescribed. Anticoagulation: Continue Eliquis for embolic CVA prophylaxis. Monitored closely by Dr. Marta Owen for iron deficiency anemia, never had any sign of bleeding. Follow up with Dr. Khadar Garvey as noted below. Future Appointments   Date Time Provider Sancho Harmon   8/11/2022  1:20 PM MD TIFFANY Aaron [facility]       Thank you for involving me in this patient's care and please call with further concerns or questions.     KAILEE Ellis  [facility] [place]  02/08/22

## 2022-02-08 ENCOUNTER — ANCILLARY PROCEDURE (OUTPATIENT)
Dept: CARDIOLOGY CLINIC | Age: 82
End: 2022-02-08
Payer: MEDICARE

## 2022-02-08 ENCOUNTER — OFFICE VISIT (OUTPATIENT)
Dept: CARDIOLOGY CLINIC | Age: 82
End: 2022-02-08
Payer: MEDICARE

## 2022-02-08 VITALS — BODY MASS INDEX: 25.34 KG/M2 | HEIGHT: 70 IN | WEIGHT: 177 LBS

## 2022-02-08 VITALS
OXYGEN SATURATION: 100 % | HEIGHT: 70 IN | RESPIRATION RATE: 18 BRPM | BODY MASS INDEX: 25.4 KG/M2 | HEART RATE: 76 BPM | DIASTOLIC BLOOD PRESSURE: 78 MMHG | SYSTOLIC BLOOD PRESSURE: 130 MMHG

## 2022-02-08 DIAGNOSIS — I34.0 MITRAL VALVE INSUFFICIENCY, UNSPECIFIED ETIOLOGY: ICD-10-CM

## 2022-02-08 DIAGNOSIS — I45.10 RBBB: ICD-10-CM

## 2022-02-08 DIAGNOSIS — I48.19 PERSISTENT ATRIAL FIBRILLATION (HCC): ICD-10-CM

## 2022-02-08 DIAGNOSIS — Z79.01 ANTICOAGULATED: ICD-10-CM

## 2022-02-08 DIAGNOSIS — I10 ESSENTIAL HYPERTENSION: ICD-10-CM

## 2022-02-08 DIAGNOSIS — I48.19 PERSISTENT ATRIAL FIBRILLATION (HCC): Primary | ICD-10-CM

## 2022-02-08 DIAGNOSIS — Z98.890 STATUS POST CATHETER ABLATION OF ATRIAL FLUTTER: ICD-10-CM

## 2022-02-08 DIAGNOSIS — Z86.79 S/P ABLATION OF ATRIAL FIBRILLATION: ICD-10-CM

## 2022-02-08 DIAGNOSIS — I44.0 FIRST DEGREE AV BLOCK: ICD-10-CM

## 2022-02-08 DIAGNOSIS — I48.3 TYPICAL ATRIAL FLUTTER (HCC): ICD-10-CM

## 2022-02-08 DIAGNOSIS — Z98.890 S/P ABLATION OF ATRIAL FIBRILLATION: ICD-10-CM

## 2022-02-08 PROCEDURE — 1101F PT FALLS ASSESS-DOCD LE1/YR: CPT | Performed by: NURSE PRACTITIONER

## 2022-02-08 PROCEDURE — 99214 OFFICE O/P EST MOD 30 MIN: CPT | Performed by: NURSE PRACTITIONER

## 2022-02-08 PROCEDURE — G8536 NO DOC ELDER MAL SCRN: HCPCS | Performed by: NURSE PRACTITIONER

## 2022-02-08 PROCEDURE — G8754 DIAS BP LESS 90: HCPCS | Performed by: NURSE PRACTITIONER

## 2022-02-08 PROCEDURE — G0463 HOSPITAL OUTPT CLINIC VISIT: HCPCS | Performed by: NURSE PRACTITIONER

## 2022-02-08 PROCEDURE — 93306 TTE W/DOPPLER COMPLETE: CPT | Performed by: INTERNAL MEDICINE

## 2022-02-08 PROCEDURE — G8432 DEP SCR NOT DOC, RNG: HCPCS | Performed by: NURSE PRACTITIONER

## 2022-02-08 PROCEDURE — G8419 CALC BMI OUT NRM PARAM NOF/U: HCPCS | Performed by: NURSE PRACTITIONER

## 2022-02-08 PROCEDURE — G8752 SYS BP LESS 140: HCPCS | Performed by: NURSE PRACTITIONER

## 2022-02-08 PROCEDURE — G8427 DOCREV CUR MEDS BY ELIG CLIN: HCPCS | Performed by: NURSE PRACTITIONER

## 2022-02-11 ENCOUNTER — TELEPHONE (OUTPATIENT)
Dept: CARDIOLOGY CLINIC | Age: 82
End: 2022-02-11

## 2022-02-11 LAB
ECHO AO ROOT DIAM: 3.5 CM
ECHO AO ROOT INDEX: 1.77 CM/M2
ECHO AV AREA PEAK VELOCITY: 2.2 CM2
ECHO AV AREA PEAK VELOCITY: 2.2 CM2
ECHO AV AREA VTI: 2.2 CM2
ECHO AV AREA VTI: 2.2 CM2
ECHO AV MEAN GRADIENT: 8 MMHG
ECHO AV MEAN VELOCITY: 1.3 M/S
ECHO AV PEAK GRADIENT: 14 MMHG
ECHO AV PEAK VELOCITY: 1.9 M/S
ECHO AV VELOCITY RATIO: 0.53
ECHO AV VTI: 38.2 CM
ECHO LA DIAMETER INDEX: 2.02 CM/M2
ECHO LA DIAMETER: 4 CM
ECHO LA TO AORTIC ROOT RATIO: 1.14
ECHO LA VOL 2C: 73 ML (ref 18–58)
ECHO LA VOL 4C: 64 ML (ref 18–58)
ECHO LA VOL BP: 70 ML (ref 18–58)
ECHO LA VOL/BSA BIPLANE: 35 ML/M2 (ref 16–34)
ECHO LA VOLUME AREA LENGTH: 76 ML
ECHO LA VOLUME INDEX A2C: 37 ML/M2 (ref 16–34)
ECHO LA VOLUME INDEX A4C: 32 ML/M2 (ref 16–34)
ECHO LA VOLUME INDEX AREA LENGTH: 38 ML/M2 (ref 16–34)
ECHO LV E' LATERAL VELOCITY: 14 CM/S
ECHO LV E' SEPTAL VELOCITY: 8 CM/S
ECHO LV EDV A2C: 64 ML
ECHO LV EDV A4C: 71 ML
ECHO LV EDV BP: 67 ML (ref 67–155)
ECHO LV EDV INDEX A4C: 36 ML/M2
ECHO LV EDV INDEX BP: 34 ML/M2
ECHO LV EDV NDEX A2C: 32 ML/M2
ECHO LV EJECTION FRACTION A2C: 65 %
ECHO LV EJECTION FRACTION A4C: 62 %
ECHO LV EJECTION FRACTION BIPLANE: 63 % (ref 55–100)
ECHO LV ESV A2C: 22 ML
ECHO LV ESV A4C: 27 ML
ECHO LV ESV BP: 25 ML (ref 22–58)
ECHO LV ESV INDEX A2C: 11 ML/M2
ECHO LV ESV INDEX A4C: 14 ML/M2
ECHO LV ESV INDEX BP: 13 ML/M2
ECHO LV FRACTIONAL SHORTENING: 32 % (ref 28–44)
ECHO LV INTERNAL DIMENSION DIASTOLE INDEX: 2.22 CM/M2
ECHO LV INTERNAL DIMENSION DIASTOLIC: 4.4 CM (ref 4.2–5.9)
ECHO LV INTERNAL DIMENSION SYSTOLIC INDEX: 1.52 CM/M2
ECHO LV INTERNAL DIMENSION SYSTOLIC: 3 CM
ECHO LV IVSD: 1.3 CM (ref 0.6–1)
ECHO LV MASS 2D: 240.3 G (ref 88–224)
ECHO LV MASS INDEX 2D: 121.3 G/M2 (ref 49–115)
ECHO LV POSTERIOR WALL DIASTOLIC: 1.5 CM (ref 0.6–1)
ECHO LV RELATIVE WALL THICKNESS RATIO: 0.68
ECHO LVOT AREA: 4.2 CM2
ECHO LVOT AV VTI INDEX: 0.55
ECHO LVOT DIAM: 2.3 CM
ECHO LVOT MEAN GRADIENT: 2 MMHG
ECHO LVOT PEAK GRADIENT: 4 MMHG
ECHO LVOT PEAK VELOCITY: 1 M/S
ECHO LVOT STROKE VOLUME INDEX: 43.8 ML/M2
ECHO LVOT SV: 86.8 ML
ECHO LVOT VTI: 20.9 CM
ECHO MV A VELOCITY: 0.69 M/S
ECHO MV E DECELERATION TIME (DT): 254.6 MS
ECHO MV E VELOCITY: 0.7 M/S
ECHO MV E/A RATIO: 1.01
ECHO MV E/E' LATERAL: 5
ECHO MV E/E' RATIO (AVERAGED): 6.88
ECHO MV E/E' SEPTAL: 8.75
ECHO PV MAX VELOCITY: 1 M/S
ECHO PV PEAK GRADIENT: 4 MMHG
ECHO RA MINOR AXIS INDEX: 1.77 CM/M2
ECHO RA MINOR AXIS: 3.5 CM
ECHO RV INTERNAL DIMENSION: 3.9 CM
ECHO RV TAPSE: 1.9 CM (ref 1.5–2)
ECHO TV REGURGITANT MAX VELOCITY: 2.54 M/S
ECHO TV REGURGITANT PEAK GRADIENT: 26 MMHG

## 2022-02-11 PROCEDURE — 93306 TTE W/DOPPLER COMPLETE: CPT | Performed by: INTERNAL MEDICINE

## 2022-02-11 NOTE — PROGRESS NOTES
LVEF 63%, mild concentric LVH. LA mildly dilated. Trace MR. Mild sclerosis of aortic valve cusps, trace AR. Trace TR. LA size has improved, previously categorized as moderately dilated. No change in treatment plan based on results.

## 2022-02-11 NOTE — TELEPHONE ENCOUNTER
----- Message from Clark Guevara NP sent at 2/11/2022  1:45 PM EST -----  LVEF 63%, mild concentric LVH. LA mildly dilated. Trace MR. Mild sclerosis of aortic valve cusps, trace AR. Trace TR. LA size has improved, previously categorized as moderately dilated. No change in treatment plan based on results.

## 2022-02-14 NOTE — TELEPHONE ENCOUNTER
Verified patient with two types of identifiers. Notified patient of results and NP recommendations. Patient verbalized understanding and will call with any other questions.       Future Appointments   Date Time Provider Sancho Harmon   8/11/2022  1:20 PM MD TIFFANY Irby AMB

## 2022-03-18 PROBLEM — Z71.89 ADVANCED CARE PLANNING/COUNSELING DISCUSSION: Status: ACTIVE | Noted: 2017-04-18

## 2022-03-18 PROBLEM — Z86.79 S/P ABLATION OF ATRIAL FIBRILLATION: Status: ACTIVE | Noted: 2018-05-23

## 2022-03-18 PROBLEM — Z98.890 S/P ABLATION OF ATRIAL FIBRILLATION: Status: ACTIVE | Noted: 2018-05-23

## 2022-03-18 PROBLEM — I48.3 TYPICAL ATRIAL FLUTTER (HCC): Status: ACTIVE | Noted: 2020-01-08

## 2022-03-19 PROBLEM — Z98.890 STATUS POST CATHETER ABLATION OF ATRIAL FLUTTER: Status: ACTIVE | Noted: 2020-01-08

## 2022-03-19 PROBLEM — I48.91 ATRIAL FIBRILLATION (HCC): Status: ACTIVE | Noted: 2020-01-08

## 2022-03-20 PROBLEM — I48.19 PERSISTENT ATRIAL FIBRILLATION (HCC): Status: ACTIVE | Noted: 2018-05-23

## 2022-04-14 ENCOUNTER — PATIENT MESSAGE (OUTPATIENT)
Dept: CARDIOLOGY CLINIC | Age: 82
End: 2022-04-14

## 2022-04-14 RX ORDER — LISINOPRIL 20 MG/1
20 TABLET ORAL DAILY
Qty: 90 TABLET | Refills: 1 | Status: SHIPPED | OUTPATIENT
Start: 2022-04-14 | End: 2022-10-25 | Stop reason: SDUPTHER

## 2022-04-14 NOTE — TELEPHONE ENCOUNTER
----- Message from Jeremy Tomas sent at 4/14/2022  1:48 PM EDT -----  Regarding: FW: lisinopril 20 mg [id number]    ----- Message -----  From: Bryanna Rashid  Sent: 4/14/2022   1:14 PM EDT  To: [facility] Nurse Pool  Subject: lisinopril 20 mg [id number]                 I need a refill of subject prescription from [address] [place] [address]    Sybil Dale [place]

## 2022-04-14 NOTE — TELEPHONE ENCOUNTER
Requested Prescriptions     Signed Prescriptions Disp Refills    lisinopriL (PRINIVIL, ZESTRIL) 20 mg tablet 90 Tablet 1     Sig: Take 1 Tablet by mouth daily.      Authorizing Provider: THEA KAMINSKI     Ordering User: Cara Marie     Future Appointments   Date Time Provider Sancho Harmon   8/11/2022  1:20 PM MD TIFFANY Graves AMB

## 2022-07-25 DIAGNOSIS — I34.0 MITRAL VALVE INSUFFICIENCY, UNSPECIFIED ETIOLOGY: ICD-10-CM

## 2022-07-25 DIAGNOSIS — I48.19 PERSISTENT ATRIAL FIBRILLATION (HCC): ICD-10-CM

## 2022-07-25 DIAGNOSIS — I10 ESSENTIAL HYPERTENSION: Primary | ICD-10-CM

## 2022-07-25 DIAGNOSIS — R06.09 DOE (DYSPNEA ON EXERTION): ICD-10-CM

## 2022-07-25 RX ORDER — FUROSEMIDE 20 MG/1
20 TABLET ORAL DAILY
Qty: 90 TABLET | Refills: 1 | Status: SHIPPED | OUTPATIENT
Start: 2022-07-25

## 2022-07-25 NOTE — LETTER
7/25/2022 4:49 PM    Mr. Martinez [place]  [address] [address] [address]      Good evening,  Dr. Lola Morales has refilled your Furosemide. To continue refilling this medication we need an updated Basic Metabolic panel  to check your kidney function. Please find enclosed a  lab slip to have this lab drawn. . You do not need to be  fasting for this lab. You may have the labs done at any [facility] location or with your family Dr. Please contact the office with any questions or concerns.    Thank you,  Gunnar Mancilla LPN          Sincerely,      Eduin Cortes MD

## 2022-07-26 RX ORDER — APIXABAN 5 MG/1
TABLET, FILM COATED ORAL
Qty: 180 TABLET | Refills: 1 | Status: SHIPPED | OUTPATIENT
Start: 2022-07-26

## 2022-07-26 NOTE — TELEPHONE ENCOUNTER
Received refill request for Eliquis 5 mg po tabs. Refill authorized.     Future Appointments   Date Time Provider Sancho Harmon   8/11/2022  1:20 PM MD TIFFANY Scales AMB

## 2022-08-08 ENCOUNTER — TELEPHONE (OUTPATIENT)
Dept: CARDIOLOGY CLINIC | Age: 82
End: 2022-08-08

## 2022-08-08 NOTE — TELEPHONE ENCOUNTER
----- Message from Rosa Mantilla MD sent at 8/6/2022  9:22 AM EDT -----  Renal function ok with eliquis

## 2022-08-21 NOTE — PROGRESS NOTES
Cardiac Electrophysiology Office Note      Subjective:      Dori Merchant is a 78 y.o. male patient who presents for follow up of persistent AF & typical AFL, is s/p ablation on 01/08/2020. Post ablation holter showed no AF. Not currently on medication for rate or rhythm control, has 1st degree AVB & RBBB on last ECG in 08/2021. Echo in 02/2022 showed normal LVEF. Nuclear stress test in 12/2020 showed no ischemia. He denies chest pain or palpitations, states he's able to play pickleball & golf without difficulty. He does have occasional lightheadedness. He also notes his HR drops into the 50s when he relaxes at times, but denies associated symptoms. BP well controlled. Anticoagulated with Eliquis, denies bleeding issues. He did have significant iron deficiency anemia last year, had negative Cologuard. Received iron infusions & now on iron supplement. States this have improved, followed closely by Dr. Jerrica Caraballo. Previous:  S/p ablation of persistent AF & typical AFL 01/08/2020. Chest CTA in 2018 and 2019 showed LAD and LCx calcification. Followed by Dr. Marie Matta.         Problem List  Date Reviewed: 2/8/2022            Codes Class Noted    Atrial fibrillation [facility]) ICD-10-CM: I48.91  ICD-9-CM: 427.31  1/8/2020        Typical atrial flutter ([address] ICD-10-CM: I48.3  ICD-9-CM: 427.32  1/8/2020        Status post catheter ablation of atrial flutter ICD-10-CM: Z98.890  ICD-9-CM: V45.89  1/8/2020    Overview Signed 1/8/2020  6:07 PM by Mark Jay MD     1/8/2020             S/P ablation of atrial fibrillation ICD-10-CM: Z98.890, Z86.79  ICD-9-CM: V45.89  5/23/2018    Overview Signed 5/23/2018  5:26 PM by Mark Jay MD     5/23/2018             Persistent atrial fibrillation [facility]) ICD-10-CM: I48.19  ICD-9-CM: 427.31  5/23/2018        Advanced care planning/counseling discussion ICD-10-CM: Z71.89  ICD-9-CM: V65.49  4/18/2017    Overview Signed 4/18/2017  3:51 PM by Jag Gonzalez MD [facility]     Asked pt to bring in             Thrombocytopenia [facility]) ICD-10-CM: D69.6  ICD-9-CM: 287.5  3/8/2016        Advance care planning ICD-10-CM: Z71.89  ICD-9-CM: V65.49  3/8/2016    Overview Signed 3/8/2016  9:04 AM by Oscar Quinonez MD     Pt has one             Acne rosacea ICD-10-CM: L71.9  ICD-9-CM: 695.3  6/1/2010        Hypercholesteremia ICD-10-CM: E78.00  ICD-9-CM: 272.0  6/1/2010        HTN (hypertension) ICD-10-CM: I10  ICD-9-CM: 401.9  6/1/2010           Current Outpatient Medications   Medication Sig Dispense Refill    Eliquis 5 mg tablet TAKE 1 TABLET TWICE A  Tablet 1    furosemide (LASIX) 20 mg tablet TAKE 1 TABLET BY MOUTH DAILY 90 Tablet 1    lisinopriL (PRINIVIL, ZESTRIL) 20 mg tablet Take 1 Tablet by mouth daily. 90 Tablet 1    iron polysaccharides (Nu-Iron) 150 mg iron capsule Take 1 Capsule by mouth two (2) times a day. 60 Capsule 3     Allergies   Allergen Reactions    Hydrochlorothiazide Rash    Pcn [Penicillins] Other (comments)     MOUTH SORE     Past Medical History:   Diagnosis Date    Acne rosacea 6/1/2010    HTN (hypertension) 6/1/2010    Hypercholesteremia 6/1/2010     No past surgical history   Family History   Problem Relation Age of Onset    Lung Disease Father      Social History     Tobacco Use    Smoking status: Former    Smokeless tobacco: Never    Tobacco comments:     1968   Substance Use Topics    Alcohol use: Yes     Alcohol/week: 8.0 standard drinks     Types: 8 Cans of beer per week     Comment: occasional      Review of Systems:   Constitutional: Negative for fever, chills, weight loss,  + occasional fatigue. HEENT: Negative for nosebleeds, vision changes. Respiratory: Negative for cough, hemoptysis, sputum production, and wheezing. Cardiovascular: Negative for chest pain, no orthopnea, claudication, no syncope, and PND. Gastrointestinal: Negative for nausea, vomiting, diarrhea, constipation, blood in stool and melena.    Genitourinary: Negative for dysuria, and hematuria. Musculoskeletal: Negative for myalgias. Skin: Negative for rash. Heme: Does not bleed   Neurological: Negative for speech change and focal weakness. Objective:     Visit Vitals  BP (!) 124/50 (BP 1 Location: Left upper arm, BP Patient Position: Sitting, BP Cuff Size: Adult)   Pulse 79   Resp 18   Ht 5' 10\" (1.778 m)   Wt 178 lb 3.2 oz (80.8 kg)   SpO2 99%   BMI 25.57 kg/m²       Constitutional: Well-developed and well-nourished. No distress. Head: Normocephalic and atraumatic. Eyes: Pupils are equal, round. Neck: Neck supple. No JVD present. Cardiovascular: Normal rate, regular rhythm and normal heart sounds. Exam reveals no gallop and no friction rub. No murmur heard. Pulmonary/Chest: Effort normal and breath sounds normal. No wheezes. Abdominal: Soft, no rebound. Musculoskeletal: Moves extremities independently. Vasc/lymphatic: No edema. Neurological: Alert, oriented. Skin: Skin is warm and dry, not diaphoretic. Psychiatric: Normal mood and affect. Behavior is normal. Judgment and thought content normal.     Assessment/Plan:     Imaging/Studies:  Echo (02/08/2022): LVEF 63%, mild concentric LVH. LA mildly dilated. Mild sclerosis of aortic valve cusps. Nuclear stress (12/29/2020): LVEF 73%. Normal LV myocardial perfusion. Holter (04/2020): Sinus rhtyhm  bpm, avg 67 bpm.  1st degree AVB. Rare 3 PACs, rare 1 PVCs. Holter (09/20/2018): SB/NSR with 1st degree AVB, RBBB, occasional PVCs. MAT (05/23/2018): LVEF 55-60%, no RWMA. LA mod dilated. RA mild to mod dilated. Mod MR. ICD-10-CM ICD-9-CM    1. Persistent atrial fibrillation (HCC)  I48.19 427.31       2. Typical atrial flutter (HCC)  I48.3 427.32       3. S/P ablation of atrial fibrillation  Z98.890 V45.89     Z86.79        4. S/P ablation of atrial flutter  Z98.890 V45.89     Z86.79        5. RBBB  I45.10 426.4       6. Primary hypertension  I10 401.9       7.  Anticoagulated Z79.01 V58.61       8. Coronary artery disease involving native coronary artery of native heart without angina pectoris  I25.10 414.01           Persistent AF/typical AFL: S/p ablation in 01/2020. Post ablation holter showed no AF or AFL. No known recurrence. No longer on meds for rate/rhythm control. CAD: LAD & LCx calcification noted on chest CT. Nuclear stress test in 12/2020 showed no ischemia. No angina. Not on a statin, LDL 90.6 in 05/2021. RBBB: Noted on prior ECG. HTN: BP controlled. Continue current regimen as previously prescribed. Anticoagulation: Continue Eliquis for embolic CVA prophylaxis. Monitored closely by Dr. To Medrano for iron deficiency anemia, has never had any sign of bleeding. Follow up with Dr. Rosa M Conway in 6 months. Future Appointments   Date Time Provider Sancho Harmon   3/30/2023  4:00 PM MD TIFFANY Scales [facility]     Thank you for involving me in this patient's care and please call with further concerns or questions.     KAILEE Austin  [facility] [place]  08/24/22

## 2022-08-24 ENCOUNTER — OFFICE VISIT (OUTPATIENT)
Dept: CARDIOLOGY CLINIC | Age: 82
End: 2022-08-24
Payer: MEDICARE

## 2022-08-24 VITALS
HEIGHT: 70 IN | SYSTOLIC BLOOD PRESSURE: 124 MMHG | RESPIRATION RATE: 18 BRPM | HEART RATE: 79 BPM | BODY MASS INDEX: 25.51 KG/M2 | OXYGEN SATURATION: 99 % | WEIGHT: 178.2 LBS | DIASTOLIC BLOOD PRESSURE: 50 MMHG

## 2022-08-24 DIAGNOSIS — I48.19 PERSISTENT ATRIAL FIBRILLATION (HCC): Primary | ICD-10-CM

## 2022-08-24 DIAGNOSIS — I48.3 TYPICAL ATRIAL FLUTTER (HCC): ICD-10-CM

## 2022-08-24 DIAGNOSIS — I10 PRIMARY HYPERTENSION: ICD-10-CM

## 2022-08-24 DIAGNOSIS — Z86.79 S/P ABLATION OF ATRIAL FLUTTER: ICD-10-CM

## 2022-08-24 DIAGNOSIS — Z79.01 ANTICOAGULATED: ICD-10-CM

## 2022-08-24 DIAGNOSIS — Z98.890 S/P ABLATION OF ATRIAL FLUTTER: ICD-10-CM

## 2022-08-24 DIAGNOSIS — Z98.890 S/P ABLATION OF ATRIAL FIBRILLATION: ICD-10-CM

## 2022-08-24 DIAGNOSIS — I45.10 RBBB: ICD-10-CM

## 2022-08-24 DIAGNOSIS — I25.10 CORONARY ARTERY DISEASE INVOLVING NATIVE CORONARY ARTERY OF NATIVE HEART WITHOUT ANGINA PECTORIS: ICD-10-CM

## 2022-08-24 DIAGNOSIS — Z86.79 S/P ABLATION OF ATRIAL FIBRILLATION: ICD-10-CM

## 2022-08-24 PROCEDURE — 1123F ACP DISCUSS/DSCN MKR DOCD: CPT | Performed by: NURSE PRACTITIONER

## 2022-08-24 PROCEDURE — 99214 OFFICE O/P EST MOD 30 MIN: CPT | Performed by: NURSE PRACTITIONER

## 2022-08-24 PROCEDURE — G8754 DIAS BP LESS 90: HCPCS | Performed by: NURSE PRACTITIONER

## 2022-08-24 PROCEDURE — G8536 NO DOC ELDER MAL SCRN: HCPCS | Performed by: NURSE PRACTITIONER

## 2022-08-24 PROCEDURE — G8752 SYS BP LESS 140: HCPCS | Performed by: NURSE PRACTITIONER

## 2022-08-24 PROCEDURE — 1101F PT FALLS ASSESS-DOCD LE1/YR: CPT | Performed by: NURSE PRACTITIONER

## 2022-08-24 PROCEDURE — G8432 DEP SCR NOT DOC, RNG: HCPCS | Performed by: NURSE PRACTITIONER

## 2022-08-24 PROCEDURE — G8427 DOCREV CUR MEDS BY ELIG CLIN: HCPCS | Performed by: NURSE PRACTITIONER

## 2022-08-24 PROCEDURE — G8417 CALC BMI ABV UP PARAM F/U: HCPCS | Performed by: NURSE PRACTITIONER

## 2022-10-26 RX ORDER — LISINOPRIL 20 MG/1
20 TABLET ORAL DAILY
Qty: 90 TABLET | Refills: 1 | Status: SHIPPED | OUTPATIENT
Start: 2022-10-26

## 2022-10-26 NOTE — TELEPHONE ENCOUNTER
Request for Lisinopril 20 mg daily . Last office visit 8/24/22, next office visit 3/30/23. Refills per verbal order from Dr. Shine Norman.

## 2023-02-01 ENCOUNTER — OFFICE VISIT (OUTPATIENT)
Dept: FAMILY MEDICINE CLINIC | Age: 83
End: 2023-02-01
Payer: MEDICARE

## 2023-02-01 VITALS
HEIGHT: 70 IN | HEART RATE: 76 BPM | TEMPERATURE: 97.7 F | RESPIRATION RATE: 16 BRPM | BODY MASS INDEX: 25.62 KG/M2 | WEIGHT: 179 LBS | OXYGEN SATURATION: 100 % | SYSTOLIC BLOOD PRESSURE: 147 MMHG | DIASTOLIC BLOOD PRESSURE: 78 MMHG

## 2023-02-01 DIAGNOSIS — D50.9 IRON DEFICIENCY ANEMIA, UNSPECIFIED IRON DEFICIENCY ANEMIA TYPE: ICD-10-CM

## 2023-02-01 DIAGNOSIS — Z12.5 SPECIAL SCREENING EXAMINATION FOR NEOPLASM OF PROSTATE: ICD-10-CM

## 2023-02-01 DIAGNOSIS — I48.19 PERSISTENT ATRIAL FIBRILLATION (HCC): ICD-10-CM

## 2023-02-01 DIAGNOSIS — I10 PRIMARY HYPERTENSION: ICD-10-CM

## 2023-02-01 DIAGNOSIS — N18.30 STAGE 3 CHRONIC KIDNEY DISEASE, UNSPECIFIED WHETHER STAGE 3A OR 3B CKD (HCC): ICD-10-CM

## 2023-02-01 DIAGNOSIS — E78.00 HYPERCHOLESTEREMIA: ICD-10-CM

## 2023-02-01 DIAGNOSIS — Z00.01 ENCOUNTER FOR ROUTINE ADULT HEALTH EXAMINATION WITH ABNORMAL FINDINGS: Primary | ICD-10-CM

## 2023-02-01 DIAGNOSIS — D69.6 THROMBOCYTOPENIA (HCC): ICD-10-CM

## 2023-02-01 PROCEDURE — 99214 OFFICE O/P EST MOD 30 MIN: CPT | Performed by: FAMILY MEDICINE

## 2023-02-01 PROCEDURE — 3077F SYST BP >= 140 MM HG: CPT | Performed by: FAMILY MEDICINE

## 2023-02-01 PROCEDURE — 1123F ACP DISCUSS/DSCN MKR DOCD: CPT | Performed by: FAMILY MEDICINE

## 2023-02-01 PROCEDURE — G8510 SCR DEP NEG, NO PLAN REQD: HCPCS | Performed by: FAMILY MEDICINE

## 2023-02-01 PROCEDURE — G8427 DOCREV CUR MEDS BY ELIG CLIN: HCPCS | Performed by: FAMILY MEDICINE

## 2023-02-01 PROCEDURE — 3078F DIAST BP <80 MM HG: CPT | Performed by: FAMILY MEDICINE

## 2023-02-01 PROCEDURE — G8536 NO DOC ELDER MAL SCRN: HCPCS | Performed by: FAMILY MEDICINE

## 2023-02-01 PROCEDURE — G0463 HOSPITAL OUTPT CLINIC VISIT: HCPCS | Performed by: FAMILY MEDICINE

## 2023-02-01 PROCEDURE — G0439 PPPS, SUBSEQ VISIT: HCPCS | Performed by: FAMILY MEDICINE

## 2023-02-01 PROCEDURE — 1101F PT FALLS ASSESS-DOCD LE1/YR: CPT | Performed by: FAMILY MEDICINE

## 2023-02-01 PROCEDURE — G8417 CALC BMI ABV UP PARAM F/U: HCPCS | Performed by: FAMILY MEDICINE

## 2023-02-01 NOTE — PROGRESS NOTES
Chief Complaint   Patient presents with    Annual Wellness Visit    Hypertension    Irregular Heart Beat     A-Fib: Eliquis    Labs     Fasting since 5:00am  Hematology: Dr Radha Person: [facility]usions     1. Have you been to the ER, urgent care clinic since your last visit? Hospitalized since your last visit? No    2. Have you seen or consulted any other health care providers outside of the [address] [place] [address] since your last visit? Include any pap smears or colon screening. Hematology: Dr Radha Person: [facility]      Medicare Wellness Exam:    Chief Complaint   Patient presents with    Annual Wellness Visit    Hypertension    Irregular Heart Beat     A-Fib: Eliquis    Labs     Fasting since 5:00am  Hematology: Dr Radha Person: [facility]     he is a 78y.o. year old male who presents for evaluation for their Medicare Wellness Visit. Randi Ebbing is completed and assessed=yes  Depression Screen is completed and assessed=yes  Medication list reviewed and adjusted for accuracy=yes  Immunizations reviewed and updated=yes  Health/Preventative Screenings reviewed and updated=yes  ADL Functions reviewed=yes    Patient Active Problem List    Diagnosis    Stage 3 chronic kidney disease, unspecified whether stage 3a or 3b CKD (HCC)    Iron deficiency anemia    Atrial fibrillation (HCC)    Typical atrial flutter (HCC)    Status post catheter ablation of atrial flutter     1/8/2020      S/P ablation of atrial fibrillation     5/23/2018      Persistent atrial fibrillation ([facility] [address]    Advanced care planning/counseling discussion     Asked pt to bring in      Thrombocytopenia [facility])    Advance care planning     Pt has one      Acne rosacea    Hypercholesteremia    HTN (hypertension)       Reviewed PmHx, RxHx, FmHx, SocHx, AllgHx and updated and dated in the chart.     Review of Systems - negative except as listed above in the HPI    Objective:     Vitals:    02/01/23 1447 02/01/23 1456   BP: (!) 151/91 (!) 147/78   Pulse: 76    Resp: 16    Temp: 97.7 °F (36.5 °C)    TempSrc: Oral    SpO2: 100%    Weight: 179 lb (81.2 kg)    Height: 5' 10\" (1.778 m)        Assessment/ Plan:   Diagnoses and all orders for this visit:    1. Encounter for routine adult health examination with abnormal findings  -     HEMOGLOBIN A1C WITH EAG; Future  -     LIPID PANEL; Future  -     METABOLIC PANEL, COMPREHENSIVE; Future  -     CBC WITH AUTOMATED DIFF; Future  -     TSH 3RD GENERATION; Future  -     IRON PROFILE; Future  -     FERRITIN; Future  -has lw    2. Stage 3 chronic kidney disease, unspecified whether stage 3a or 3b CKD (HCC)  -     METABOLIC PANEL, COMPREHENSIVE; Future  -     CBC WITH AUTOMATED DIFF; Future  -stable    3. Persistent atrial fibrillation (HCC)  -rate controlled    4. Thrombocytopenia (HCC)  -     CBC WITH AUTOMATED DIFF; Future    5. Primary hypertension  -     LIPID PANEL; Future  -     METABOLIC PANEL, COMPREHENSIVE; Future  -at goal    6. Iron deficiency anemia, unspecified iron deficiency anemia type  -     CBC WITH AUTOMATED DIFF; Future  -     IRON PROFILE; Future  -     FERRITIN; Future  -gets occ transfusion, last in Dec, refuses to do scope    7. Hypercholesteremia  -     LIPID PANEL; Future  -     METABOLIC PANEL, COMPREHENSIVE; Future    8.  Special screening examination for neoplasm of prostate  -check psa         -Pain evaluation performed in office  -Cognitive Screen performed in office  -Depression Screen, Fall risks (by up and go test)  and ADL functionality were addressed  -Medication list updated and reviewed for any changes   -A comprehensive review of medical issues and a plan was formulated  -End of life planning was addressed with pt   -Health Screenings for preventions were addressed and a plan was formulated  -Shingles Vaccine was recommended  -Discussed with patient cancer risk factors and appropriate screenings for age  -Patient evaluated for colonoscopy and referred if needed per screeing criteria  -Labs from previous visits were discussed with patient   -Discussed with patient diet and exercise and formulated a plan as needed  -An Advanced care plan was developed with the patient.  -Alcohol screening performed and was negative    -  Follow-up and Dispositions    Return in about 1 year (around 2/1/2024). I have discussed the diagnosis with the patient and the intended plan as seen in the above orders. The patient understands and agrees with the plan. The patient has received an after-visit summary and questions were answered concerning future plans. Medication Side Effects and Warnings were discussed with patien  Patient Labs were reviewed and or requested  Patient Past Records were reviewed and or requested    There are no Patient Instructions on file for this visit.       Tobias Dakin, M.D.

## 2023-02-01 NOTE — PROGRESS NOTES
Chief Complaint   Patient presents with    Annual Wellness Visit    Hypertension    Irregular Heart Beat     A-Fib: Eliquis    Labs     Fasting since 5:00am  Hematology: Dr Theresa Penn: Ferritin Infusions     1. Have you been to the ER, urgent care clinic since your last visit? Hospitalized since your last visit? No    2. Have you seen or consulted any other health care providers outside of the [address] [place] [address] since your last visit? Include any pap smears or colon screening.  Hematology: Dr Theresa Penn: Ferritin Infusions

## 2023-02-02 LAB
ALBUMIN SERPL-MCNC: 3.8 G/DL (ref 3.5–5)
ALBUMIN/GLOB SERPL: 1.2 (ref 1.1–2.2)
ALP SERPL-CCNC: 90 U/L (ref 45–117)
ALT SERPL-CCNC: 22 U/L (ref 12–78)
ANION GAP SERPL CALC-SCNC: 4 MMOL/L (ref 5–15)
AST SERPL-CCNC: 21 U/L (ref 15–37)
BASOPHILS # BLD: 0.1 K/UL (ref 0–0.1)
BASOPHILS NFR BLD: 1 % (ref 0–1)
BILIRUB SERPL-MCNC: 0.4 MG/DL (ref 0.2–1)
BUN SERPL-MCNC: 16 MG/DL (ref 6–20)
BUN/CREAT SERPL: 12 (ref 12–20)
CALCIUM SERPL-MCNC: 9.8 MG/DL (ref 8.5–10.1)
CHLORIDE SERPL-SCNC: 104 MMOL/L (ref 97–108)
CHOLEST SERPL-MCNC: 186 MG/DL
CO2 SERPL-SCNC: 29 MMOL/L (ref 21–32)
COMMENT, HOLDF: NORMAL
CREAT SERPL-MCNC: 1.38 MG/DL (ref 0.7–1.3)
DIFFERENTIAL METHOD BLD: ABNORMAL
EOSINOPHIL # BLD: 0.2 K/UL (ref 0–0.4)
EOSINOPHIL NFR BLD: 4 % (ref 0–7)
ERYTHROCYTE [DISTWIDTH] IN BLOOD BY AUTOMATED COUNT: 18.1 % (ref 11.5–14.5)
EST. AVERAGE GLUCOSE BLD GHB EST-MCNC: 85 MG/DL
FERRITIN SERPL-MCNC: 31 NG/ML (ref 26–388)
GLOBULIN SER CALC-MCNC: 3.2 G/DL (ref 2–4)
GLUCOSE SERPL-MCNC: 97 MG/DL (ref 65–100)
HBA1C MFR BLD: 4.6 % (ref 4–5.6)
HCT VFR BLD AUTO: 38.3 % (ref 36.6–50.3)
HDLC SERPL-MCNC: 55 MG/DL
HDLC SERPL: 3.4 (ref 0–5)
HGB BLD-MCNC: 11.1 G/DL (ref 12.1–17)
IMM GRANULOCYTES # BLD AUTO: 0.1 K/UL (ref 0–0.04)
IMM GRANULOCYTES NFR BLD AUTO: 2 % (ref 0–0.5)
IRON SATN MFR SERPL: 5 % (ref 20–50)
IRON SERPL-MCNC: 22 UG/DL (ref 35–150)
LDLC SERPL CALC-MCNC: 116.8 MG/DL (ref 0–100)
LYMPHOCYTES # BLD: 1 K/UL (ref 0.8–3.5)
LYMPHOCYTES NFR BLD: 18 % (ref 12–49)
MCH RBC QN AUTO: 23.6 PG (ref 26–34)
MCHC RBC AUTO-ENTMCNC: 29 G/DL (ref 30–36.5)
MCV RBC AUTO: 81.5 FL (ref 80–99)
MONOCYTES # BLD: 0.7 K/UL (ref 0–1)
MONOCYTES NFR BLD: 13 % (ref 5–13)
NEUTS SEG # BLD: 3.5 K/UL (ref 1.8–8)
NEUTS SEG NFR BLD: 62 % (ref 32–75)
NRBC # BLD: 0 K/UL (ref 0–0.01)
NRBC BLD-RTO: 0 PER 100 WBC
PLATELET # BLD AUTO: 183 K/UL (ref 150–400)
POTASSIUM SERPL-SCNC: 5 MMOL/L (ref 3.5–5.1)
PROT SERPL-MCNC: 7 G/DL (ref 6.4–8.2)
PSA SERPL-MCNC: 1.6 NG/ML (ref 0.01–4)
RBC # BLD AUTO: 4.7 M/UL (ref 4.1–5.7)
RBC MORPH BLD: ABNORMAL
RBC MORPH BLD: ABNORMAL
SAMPLES BEING HELD,HOLD: NORMAL
SODIUM SERPL-SCNC: 137 MMOL/L (ref 136–145)
TIBC SERPL-MCNC: 433 UG/DL (ref 250–450)
TRIGL SERPL-MCNC: 71 MG/DL (ref ?–150)
TSH SERPL DL<=0.05 MIU/L-ACNC: 1.44 UIU/ML (ref 0.36–3.74)
VLDLC SERPL CALC-MCNC: 14.2 MG/DL
WBC # BLD AUTO: 5.6 K/UL (ref 4.1–11.1)

## 2023-02-13 RX ORDER — FUROSEMIDE 20 MG/1
20 TABLET ORAL DAILY
Qty: 90 TABLET | Refills: 1 | Status: SHIPPED | OUTPATIENT
Start: 2023-02-13

## 2023-02-13 NOTE — TELEPHONE ENCOUNTER
Request for Lasix 20 mg daily. Last office visit 8/24/22, next office visit 3/30/23. Refills per verbal order from Dr. Farhana Akbar.

## 2023-04-24 ENCOUNTER — CLINICAL SUPPORT (OUTPATIENT)
Dept: CARDIOLOGY CLINIC | Age: 83
End: 2023-04-24
Payer: MEDICARE

## 2023-04-24 ENCOUNTER — OFFICE VISIT (OUTPATIENT)
Dept: CARDIOLOGY CLINIC | Age: 83
End: 2023-04-24
Payer: MEDICARE

## 2023-04-24 VITALS
SYSTOLIC BLOOD PRESSURE: 120 MMHG | DIASTOLIC BLOOD PRESSURE: 72 MMHG | OXYGEN SATURATION: 96 % | BODY MASS INDEX: 25.48 KG/M2 | WEIGHT: 178 LBS | RESPIRATION RATE: 16 BRPM | HEIGHT: 70 IN | HEART RATE: 81 BPM

## 2023-04-24 DIAGNOSIS — I10 PRIMARY HYPERTENSION: ICD-10-CM

## 2023-04-24 DIAGNOSIS — I45.10 RBBB: ICD-10-CM

## 2023-04-24 DIAGNOSIS — Z86.79 S/P ABLATION OF ATRIAL FLUTTER: ICD-10-CM

## 2023-04-24 DIAGNOSIS — Z98.890 S/P ABLATION OF ATRIAL FIBRILLATION: ICD-10-CM

## 2023-04-24 DIAGNOSIS — I48.19 PERSISTENT ATRIAL FIBRILLATION (HCC): Primary | ICD-10-CM

## 2023-04-24 DIAGNOSIS — Z86.79 S/P ABLATION OF ATRIAL FIBRILLATION: ICD-10-CM

## 2023-04-24 DIAGNOSIS — Z79.01 ANTICOAGULATED: ICD-10-CM

## 2023-04-24 DIAGNOSIS — I25.10 CORONARY ARTERY DISEASE INVOLVING NATIVE CORONARY ARTERY OF NATIVE HEART WITHOUT ANGINA PECTORIS: ICD-10-CM

## 2023-04-24 DIAGNOSIS — Z98.890 S/P ABLATION OF ATRIAL FLUTTER: ICD-10-CM

## 2023-04-24 PROCEDURE — 93225 XTRNL ECG REC<48 HRS REC: CPT | Performed by: INTERNAL MEDICINE

## 2023-04-24 PROCEDURE — G8536 NO DOC ELDER MAL SCRN: HCPCS | Performed by: NURSE PRACTITIONER

## 2023-04-24 PROCEDURE — G8427 DOCREV CUR MEDS BY ELIG CLIN: HCPCS | Performed by: NURSE PRACTITIONER

## 2023-04-24 PROCEDURE — 1123F ACP DISCUSS/DSCN MKR DOCD: CPT | Performed by: NURSE PRACTITIONER

## 2023-04-24 PROCEDURE — G8417 CALC BMI ABV UP PARAM F/U: HCPCS | Performed by: NURSE PRACTITIONER

## 2023-04-24 PROCEDURE — G8432 DEP SCR NOT DOC, RNG: HCPCS | Performed by: NURSE PRACTITIONER

## 2023-04-24 PROCEDURE — 99214 OFFICE O/P EST MOD 30 MIN: CPT | Performed by: NURSE PRACTITIONER

## 2023-04-24 PROCEDURE — G0463 HOSPITAL OUTPT CLINIC VISIT: HCPCS | Performed by: NURSE PRACTITIONER

## 2023-04-24 PROCEDURE — 3078F DIAST BP <80 MM HG: CPT | Performed by: NURSE PRACTITIONER

## 2023-04-24 PROCEDURE — 1101F PT FALLS ASSESS-DOCD LE1/YR: CPT | Performed by: NURSE PRACTITIONER

## 2023-04-24 PROCEDURE — 3074F SYST BP LT 130 MM HG: CPT | Performed by: NURSE PRACTITIONER

## 2023-04-24 RX ORDER — LISINOPRIL 20 MG/1
20 TABLET ORAL DAILY
Qty: 90 TABLET | Refills: 1 | Status: SHIPPED | OUTPATIENT
Start: 2023-04-24

## 2023-04-24 RX ORDER — LISINOPRIL 20 MG/1
20 TABLET ORAL DAILY
Qty: 90 TABLET | Refills: 1 | Status: CANCELLED | OUTPATIENT
Start: 2023-04-24

## 2023-04-24 NOTE — PROGRESS NOTES
Applied 48 hr holter per Sergei Edward NP dx: Persistent afib, hypertension. Pt has #[id number]. Chargeable visit.

## 2023-04-24 NOTE — PATIENT INSTRUCTIONS
A 48 hour holter will be placed today    You will need to follow up in clinic with Dr. Mitali Hawkins in 6 months.      Please increase iron in your diet

## 2023-05-02 ENCOUNTER — PATIENT MESSAGE (OUTPATIENT)
Dept: CARDIOLOGY CLINIC | Age: 83
End: 2023-05-02

## 2023-05-02 ENCOUNTER — TELEPHONE (OUTPATIENT)
Dept: CARDIOLOGY CLINIC | Age: 83
End: 2023-05-02

## 2023-05-02 PROCEDURE — 93227 XTRNL ECG REC<48 HR R&I: CPT | Performed by: INTERNAL MEDICINE

## 2023-05-12 ENCOUNTER — CLINICAL DOCUMENTATION (OUTPATIENT)
Age: 83
End: 2023-05-12

## 2023-05-12 NOTE — PROGRESS NOTES
Received labs dated 05/08/2023.     WBC 6.9  Hgb 10.9  Hct 34.3  Plt 181    Tbili 0.3  AST 21  ALT 12

## 2023-07-14 NOTE — TELEPHONE ENCOUNTER
----- Message from Russ Perez. Estefania Garrido sent at 6/23/2017  9:50 AM EDT -----  Regarding: RE:test results  Contact: [phone]  Not sure what is meant by an echo that is normal, can you be more descriptive? I was told that I had an irregular heartbeat based on a previous ekg. Based upon your information, what is the current situation based on the echo? Also, it appears that the Pradaxa capsules tend to me give acid-reflux, lets wait awhile, but a change might be warranted. The medicine appears to work, blood pressure  approx. 115/70s, when sitting. Thank you,  Breezy Swain  ----- Message -----  From: Julianne Corbett LPN  Sent: 7/40/9851  9:34 AM EDT  To: Russ Perez. Estefania Garrido  Subject: test results    Dr Xavi Rojas wanted me to let you know that your echo was normal with moderate mitral regurgitation. No changes at this point. If you have any questions please feel free to contact our office.     Thanks,   Erasmo Tracy LPN
Verified patient with two types of identifiers. Notified patient of echo results. He states that he will call if he can not tolerate pradaxa due to the acid reflux.
Yes

## 2023-07-27 ENCOUNTER — PATIENT MESSAGE (OUTPATIENT)
Age: 83
End: 2023-07-27

## 2023-08-01 NOTE — TELEPHONE ENCOUNTER
From: Jcarlos Rowe  To: Dr. Delbert Harrisonrel: 7/27/2023 8:25 PM EDT  Subject: Benjamen Katelynn    Dr Melba Hunt  I have recently become aware of \"Watchman\"an implant for people with non-valvular AFib. Would I be a good candidate for this implant? I currently take Eliquis and my hematologist, Dr Matthews-([facility]) has indicated that it probably causes internal bleeding and my blood work has so indicated. I get anemic and require ferritin fusions. Recent blood work on 7/10/0210: CBC/Metabolic Panel indicated--- HGB=11.8--Ferritin= 26mg/ml---Iron=24ug/dl--Iron Sat=7. If I am a worthwhile candidate, maybe we could do something In November!    Respectfully, ---Cathie Paul

## 2023-08-07 RX ORDER — APIXABAN 5 MG/1
TABLET, FILM COATED ORAL
Qty: 180 TABLET | Refills: 1 | Status: SHIPPED | OUTPATIENT
Start: 2023-08-07

## 2023-08-07 NOTE — TELEPHONE ENCOUNTER
Request for Eliquis 5 mg BID. Last office visit 4/24/23, next office visit 10/31/23. Refills per verbal order from Dr. Randi Epperson.

## 2023-08-17 RX ORDER — FUROSEMIDE 20 MG/1
TABLET ORAL
Qty: 90 TABLET | Refills: 1 | Status: SHIPPED | OUTPATIENT
Start: 2023-08-17

## 2023-08-17 NOTE — TELEPHONE ENCOUNTER
Request for Lasix 20 mg daily. Last office visit 4/24/23, next office visit 10/31/23. Refills per verbal order from Dr. Jermaine Crain.

## 2023-10-31 ENCOUNTER — OFFICE VISIT (OUTPATIENT)
Age: 83
End: 2023-10-31
Payer: MEDICARE

## 2023-10-31 VITALS
SYSTOLIC BLOOD PRESSURE: 112 MMHG | OXYGEN SATURATION: 99 % | DIASTOLIC BLOOD PRESSURE: 82 MMHG | HEIGHT: 70 IN | HEART RATE: 60 BPM | WEIGHT: 170 LBS | RESPIRATION RATE: 18 BRPM | BODY MASS INDEX: 24.34 KG/M2

## 2023-10-31 DIAGNOSIS — I48.19 PERSISTENT ATRIAL FIBRILLATION (HCC): Primary | ICD-10-CM

## 2023-10-31 DIAGNOSIS — I34.0 NONRHEUMATIC MITRAL (VALVE) INSUFFICIENCY: ICD-10-CM

## 2023-10-31 DIAGNOSIS — I10 ESSENTIAL (PRIMARY) HYPERTENSION: ICD-10-CM

## 2023-10-31 DIAGNOSIS — I44.0 ATRIOVENTRICULAR BLOCK, FIRST DEGREE: ICD-10-CM

## 2023-10-31 DIAGNOSIS — D68.69 SECONDARY HYPERCOAGULABLE STATE (HCC): ICD-10-CM

## 2023-10-31 DIAGNOSIS — I48.3 TYPICAL ATRIAL FLUTTER (HCC): ICD-10-CM

## 2023-10-31 DIAGNOSIS — Z79.01 LONG TERM (CURRENT) USE OF ANTICOAGULANTS: ICD-10-CM

## 2023-10-31 DIAGNOSIS — I48.19 OTHER PERSISTENT ATRIAL FIBRILLATION (HCC): ICD-10-CM

## 2023-10-31 DIAGNOSIS — I48.0 AF (PAROXYSMAL ATRIAL FIBRILLATION) (HCC): ICD-10-CM

## 2023-10-31 LAB
ANION GAP SERPL CALC-SCNC: 7 MMOL/L (ref 5–15)
BASOPHILS # BLD: 0.1 K/UL (ref 0–0.1)
BASOPHILS NFR BLD: 1 % (ref 0–1)
BUN SERPL-MCNC: 16 MG/DL (ref 6–20)
BUN/CREAT SERPL: 13 (ref 12–20)
CALCIUM SERPL-MCNC: 9.1 MG/DL (ref 8.5–10.1)
CHLORIDE SERPL-SCNC: 105 MMOL/L (ref 97–108)
CO2 SERPL-SCNC: 27 MMOL/L (ref 21–32)
CREAT SERPL-MCNC: 1.23 MG/DL (ref 0.7–1.3)
DIFFERENTIAL METHOD BLD: ABNORMAL
EOSINOPHIL # BLD: 0.2 K/UL (ref 0–0.4)
EOSINOPHIL NFR BLD: 4 % (ref 0–7)
ERYTHROCYTE [DISTWIDTH] IN BLOOD BY AUTOMATED COUNT: 18.5 % (ref 11.5–14.5)
GLUCOSE SERPL-MCNC: 88 MG/DL (ref 65–100)
HCT VFR BLD AUTO: 40.4 % (ref 36.6–50.3)
HGB BLD-MCNC: 12.3 G/DL (ref 12.1–17)
IMM GRANULOCYTES # BLD AUTO: 0 K/UL (ref 0–0.04)
IMM GRANULOCYTES NFR BLD AUTO: 0 % (ref 0–0.5)
LYMPHOCYTES # BLD: 1 K/UL (ref 0.8–3.5)
LYMPHOCYTES NFR BLD: 17 % (ref 12–49)
MCH RBC QN AUTO: 25.6 PG (ref 26–34)
MCHC RBC AUTO-ENTMCNC: 30.4 G/DL (ref 30–36.5)
MCV RBC AUTO: 84.2 FL (ref 80–99)
MONOCYTES # BLD: 0.7 K/UL (ref 0–1)
MONOCYTES NFR BLD: 12 % (ref 5–13)
NEUTS SEG # BLD: 4.3 K/UL (ref 1.8–8)
NEUTS SEG NFR BLD: 66 % (ref 32–75)
NRBC # BLD: 0 K/UL (ref 0–0.01)
NRBC BLD-RTO: 0 PER 100 WBC
PLATELET # BLD AUTO: 176 K/UL (ref 150–400)
PMV BLD AUTO: 11.3 FL (ref 8.9–12.9)
POTASSIUM SERPL-SCNC: 4.4 MMOL/L (ref 3.5–5.1)
RBC # BLD AUTO: 4.8 M/UL (ref 4.1–5.7)
SODIUM SERPL-SCNC: 139 MMOL/L (ref 136–145)
WBC # BLD AUTO: 6.3 K/UL (ref 4.1–11.1)

## 2023-10-31 PROCEDURE — 3078F DIAST BP <80 MM HG: CPT | Performed by: INTERNAL MEDICINE

## 2023-10-31 PROCEDURE — 99214 OFFICE O/P EST MOD 30 MIN: CPT | Performed by: INTERNAL MEDICINE

## 2023-10-31 PROCEDURE — G8484 FLU IMMUNIZE NO ADMIN: HCPCS | Performed by: INTERNAL MEDICINE

## 2023-10-31 PROCEDURE — 3074F SYST BP LT 130 MM HG: CPT | Performed by: INTERNAL MEDICINE

## 2023-10-31 PROCEDURE — 1036F TOBACCO NON-USER: CPT | Performed by: INTERNAL MEDICINE

## 2023-10-31 PROCEDURE — G8427 DOCREV CUR MEDS BY ELIG CLIN: HCPCS | Performed by: INTERNAL MEDICINE

## 2023-10-31 PROCEDURE — G8420 CALC BMI NORM PARAMETERS: HCPCS | Performed by: INTERNAL MEDICINE

## 2023-10-31 PROCEDURE — 1123F ACP DISCUSS/DSCN MKR DOCD: CPT | Performed by: INTERNAL MEDICINE

## 2023-10-31 ASSESSMENT — PATIENT HEALTH QUESTIONNAIRE - PHQ9
SUM OF ALL RESPONSES TO PHQ QUESTIONS 1-9: 0
2. FEELING DOWN, DEPRESSED OR HOPELESS: 0
SUM OF ALL RESPONSES TO PHQ QUESTIONS 1-9: 0
SUM OF ALL RESPONSES TO PHQ9 QUESTIONS 1 & 2: 0
SUM OF ALL RESPONSES TO PHQ QUESTIONS 1-9: 0
SUM OF ALL RESPONSES TO PHQ QUESTIONS 1-9: 0
1. LITTLE INTEREST OR PLEASURE IN DOING THINGS: 0

## 2023-10-31 NOTE — PATIENT INSTRUCTIONS
Your Pre Watchman MG procedure has been scheduled for 11/13/23 at 12:30 pm, at [facility].    Please report to Admitting Department by 11:00 am. Please bring a list of your current medications and medication bottles, if able, to the hospital on this day. You will be unable to drive after your procedure so please make sure to bring someone with you to your procedure. You will need to have nothing to eat or drink after midnight, the night prior to your procedure. You may have small sips of water, if needed, to take with your medication. You will need labs drawn prior to your procedure. Please go to have this done today. You should NOT stop your medication prior to your scheduled procedure. Your Watchman/MG procedure has been scheduled for 11/21/23 at 12:30 pm, at [facility].    Please report to Admitting Department by 10:30 am, or 2 hours prior to your scheduled procedure. Please bring a list of your current medications and medication bottles, if able, to the hospital on this day. You will be unable to drive after your procedure so please make sure to bring someone with you to your procedure. You will need to have nothing to eat or drink after midnight, the night prior to your procedure. You may have small sips of water, if needed, to take with your medication. You should stop your medication, Eliquis, 2 days prior to your scheduled procedure. After your procedure, you will need to follow up with Nickolas Barr NP.  Your follow-up appointment has been scheduled for 12/11/23 at 8:40 am.

## 2023-10-31 NOTE — PROGRESS NOTES
Marital status:      Spouse name: Not on file    Number of children: Not on file    Years of education: Not on file    Highest education level: Not on file   Occupational History    Not on file   Tobacco Use    Smoking status: Former     Passive exposure: Never    Smokeless tobacco: Never   Substance and Sexual Activity    Alcohol use: Yes     Alcohol/week: 8.0 standard drinks of alcohol     Types: 8 Standard drinks or equivalent per week     Comment: 5-6 beer a week    Drug use: Never    Sexual activity: Not on file   Other Topics Concern    Not on file   Social History Narrative    Not on file     Social Determinants of Health     Financial Resource Strain: Not on file   Food Insecurity: Not on file   Transportation Needs: Not on file   Physical Activity: Not on file   Stress: Not on file   Social Connections: Not on file   Intimate Partner Violence: Not on file   Housing Stability: Not on file     Family History   Problem Relation Age of Onset    Lung Disease Father      Allergies   Allergen Reactions    Penicillins Other (See Comments)     MOUTH SORE    Hydrochlorothiazide Rash     Current Outpatient Medications   Medication Sig    furosemide (LASIX) 20 MG tablet TAKE 1 TABLET BY MOUTH DAILY    ELIQUIS 5 MG TABS tablet TAKE 1 TABLET TWICE A DAY    iron polysaccharides (NIFEREX) 150 MG capsule Take by mouth 2 times daily    lisinopril (PRINIVIL;ZESTRIL) 20 MG tablet TAKE 1 TABLET BY MOUTH DAILY     No current facility-administered medications for this visit. Review of Systems:   Constitutional: Negative for fever, chills, weight loss,  + occasional fatigue. HEENT: Negative for nosebleeds, vision changes. Respiratory: Negative for cough, hemoptysis, sputum production, and wheezing. Cardiovascular: Negative for chest pain, no orthopnea, claudication, no syncope, and PND. Gastrointestinal: Negative for nausea, vomiting, diarrhea, constipation, blood in stool and melena.    Genitourinary:

## 2023-10-31 NOTE — H&P (VIEW-ONLY)
requiring drainage or surgery, heart failure, migration of the device, emergency surgery, myocardial infarction, stroke and death. Based on both stroke and bleeding risk, a shared decision has been made to pursue closure of left atrial appendage as a safe and effective alternative to oral anticoagulant therapy for stroke prophylaxis and to reduce their long term risk of bleeding. Thank you for involving me in this patient's care and please call with further concerns or questions. Future Appointments   Date Time Provider 4600  46[facility]   11/13/2023 12:30 PM [facility] STRESS ECHO LAB 1 [facility]   11/21/2023 12:30 PM [facility] CATH LAB 3 [facility]   12/11/2023  8:40 AM RENÉ Marcelo NP, M.D.    Electrophysiology/Cardiology   [facility] [place]   [address] [place] [address],[address]   [facility], [address] [place] [address] St                             [address] [facility]   [phone]

## 2023-11-06 PROBLEM — D68.69 SECONDARY HYPERCOAGULABLE STATE (HCC): Status: ACTIVE | Noted: 2023-11-06

## 2023-11-06 RX ORDER — LISINOPRIL 20 MG/1
20 TABLET ORAL DAILY
Qty: 90 TABLET | Refills: 1 | Status: SHIPPED | OUTPATIENT
Start: 2023-11-06

## 2023-11-06 NOTE — TELEPHONE ENCOUNTER
Request for Lisinopril 20 mg daily. Last office visit 10/31/23, next office visit 11/13/23. Refills per verbal order from Dr. Edmar Turner.

## 2023-11-07 ENCOUNTER — PREP FOR PROCEDURE (OUTPATIENT)
Age: 83
End: 2023-11-07

## 2023-11-12 RX ORDER — SODIUM CHLORIDE 9 MG/ML
INJECTION, SOLUTION INTRAVENOUS PRN
Status: CANCELLED | OUTPATIENT
Start: 2023-11-12

## 2023-11-12 RX ORDER — SODIUM CHLORIDE 0.9 % (FLUSH) 0.9 %
5-40 SYRINGE (ML) INJECTION PRN
Status: CANCELLED | OUTPATIENT
Start: 2023-11-12

## 2023-11-12 RX ORDER — SODIUM CHLORIDE 0.9 % (FLUSH) 0.9 %
5-40 SYRINGE (ML) INJECTION EVERY 12 HOURS SCHEDULED
Status: CANCELLED | OUTPATIENT
Start: 2023-11-12

## 2023-11-13 ENCOUNTER — HOSPITAL ENCOUNTER (OUTPATIENT)
Facility: HOSPITAL | Age: 83
Discharge: HOME | End: 2023-11-15
Attending: INTERNAL MEDICINE
Payer: MEDICARE

## 2023-11-13 ENCOUNTER — ANESTHESIA EVENT (OUTPATIENT)
Facility: HOSPITAL | Age: 83
End: 2023-11-13
Payer: MEDICARE

## 2023-11-13 ENCOUNTER — ANESTHESIA (OUTPATIENT)
Facility: HOSPITAL | Age: 83
End: 2023-11-13
Payer: MEDICARE

## 2023-11-13 VITALS
SYSTOLIC BLOOD PRESSURE: 129 MMHG | WEIGHT: 170 LBS | DIASTOLIC BLOOD PRESSURE: 67 MMHG | HEART RATE: 62 BPM | RESPIRATION RATE: 20 BRPM | OXYGEN SATURATION: 96 % | BODY MASS INDEX: 24.34 KG/M2 | HEIGHT: 70 IN

## 2023-11-13 DIAGNOSIS — E78.00 HYPERCHOLESTEROLEMIA: ICD-10-CM

## 2023-11-13 DIAGNOSIS — I10 HYPERTENSION, UNSPECIFIED TYPE: ICD-10-CM

## 2023-11-13 DIAGNOSIS — I48.91 ATRIAL FIBRILLATION, UNSPECIFIED TYPE (HCC): ICD-10-CM

## 2023-11-13 LAB — ECHO BSA: 1.95 M2

## 2023-11-13 PROCEDURE — 93312 ECHO TRANSESOPHAGEAL: CPT | Performed by: INTERNAL MEDICINE

## 2023-11-13 PROCEDURE — 3700000000 HC ANESTHESIA ATTENDED CARE

## 2023-11-13 PROCEDURE — 2500000003 HC RX 250 WO HCPCS: Performed by: NURSE ANESTHETIST, CERTIFIED REGISTERED

## 2023-11-13 PROCEDURE — 2580000003 HC RX 258: Performed by: NURSE ANESTHETIST, CERTIFIED REGISTERED

## 2023-11-13 PROCEDURE — 6360000002 HC RX W HCPCS: Performed by: NURSE ANESTHETIST, CERTIFIED REGISTERED

## 2023-11-13 PROCEDURE — 93312 ECHO TRANSESOPHAGEAL: CPT

## 2023-11-13 PROCEDURE — 3700000001 HC ADD 15 MINUTES (ANESTHESIA)

## 2023-11-13 PROCEDURE — 93325 DOPPLER ECHO COLOR FLOW MAPG: CPT | Performed by: INTERNAL MEDICINE

## 2023-11-13 RX ORDER — PROPOFOL 10 MG/ML
INJECTION, EMULSION INTRAVENOUS
Status: COMPLETED
Start: 2023-11-13 | End: 2023-11-13

## 2023-11-13 RX ORDER — SODIUM CHLORIDE 9 MG/ML
INJECTION, SOLUTION INTRAVENOUS CONTINUOUS PRN
Status: DISCONTINUED | OUTPATIENT
Start: 2023-11-13 | End: 2023-11-13 | Stop reason: SDUPTHER

## 2023-11-13 RX ORDER — ONDANSETRON 2 MG/ML
4 INJECTION INTRAMUSCULAR; INTRAVENOUS EVERY 6 HOURS PRN
Status: CANCELLED | OUTPATIENT
Start: 2023-11-13

## 2023-11-13 RX ORDER — ACETAMINOPHEN 325 MG/1
650 TABLET ORAL EVERY 4 HOURS PRN
Status: DISCONTINUED | OUTPATIENT
Start: 2023-11-13 | End: 2023-11-16 | Stop reason: HOSPADM

## 2023-11-13 RX ORDER — LIDOCAINE HYDROCHLORIDE 20 MG/ML
INJECTION, SOLUTION EPIDURAL; INFILTRATION; INTRACAUDAL; PERINEURAL PRN
Status: DISCONTINUED | OUTPATIENT
Start: 2023-11-13 | End: 2023-11-13 | Stop reason: SDUPTHER

## 2023-11-13 RX ORDER — ATROPINE SULFATE 0.1 MG/ML
INJECTION INTRAVENOUS
Status: DISPENSED
Start: 2023-11-13 | End: 2023-11-14

## 2023-11-13 RX ADMIN — PROPOFOL 50 MG: 10 INJECTION, EMULSION INTRAVENOUS at 12:59

## 2023-11-13 RX ADMIN — PROPOFOL 50 MG: 10 INJECTION, EMULSION INTRAVENOUS at 12:58

## 2023-11-13 RX ADMIN — PROPOFOL 30 MG: 10 INJECTION, EMULSION INTRAVENOUS at 13:00

## 2023-11-13 RX ADMIN — LIDOCAINE HYDROCHLORIDE 60 MG: 20 INJECTION, SOLUTION EPIDURAL; INFILTRATION; INTRACAUDAL; PERINEURAL at 12:58

## 2023-11-13 RX ADMIN — SODIUM CHLORIDE: 9 INJECTION, SOLUTION INTRAVENOUS at 12:45

## 2023-11-13 NOTE — PROGRESS NOTES
Date of procedure November 13 2023  Procedure transesophageal echocardiogram   Assistant none   Complication none  Sedation by anesthesia MAC  BLOOD LOSS NONE  FINDINGS:  No thrombus  Difficult window   DIAMOND OS 10 cm   Mild MR    PLAN: watchman implant later

## 2023-11-13 NOTE — PROGRESS NOTES
Patient has returned to baseline at arrival for procedure. Discussed AVS and discharge instructions with patient and provided a copy take home. Pt voiced understanding and denied any questions or need for clarification. IV D/C'd and hemostasis attained. Coban and gauze dressing applied. Transported with:  Nurse via W/C to vehicle driven by wife.

## 2023-11-13 NOTE — DISCHARGE INSTRUCTIONS
Discharge Instructions Post MG    No driving x 24 hours due to sedation medication that you received during your procedure. Resume medications as previously prescribed. Follow up as noted below. Future Appointments   Date Time Provider 4600  [address]   11/13/2023 12:30 PM [facility] STRESS ECHO LAB 1 [facility]   11/21/2023 12:30 PM [facility] CATH LAB 3 [facility]   12/11/2023  8:40 AM RENÉ Schreiber - YNES Watts M.D.   Electrophysiology/Cardiology  [facility]  [address] [facility], [facility] [address]  [address] [place] [address] Sultana Goncalves, [address] [place] [address]              [phone]

## 2023-11-13 NOTE — PROGRESS NOTES
Pt arrives ambulatory to noninvasive cardiology department accompanied by wife for MG procedure. All assessments completed and consent was reviewed. Education given was regarding procedure, sedation medications to be given, potential side effects of medications to be given, post-procedure management and follow-up. Opportunity for questions was provided and all questions and concerns were addressed. Patient and patient contact verbalized understanding of education.

## 2023-11-14 ENCOUNTER — PREP FOR PROCEDURE (OUTPATIENT)
Age: 83
End: 2023-11-14

## 2023-11-14 RX ORDER — SODIUM CHLORIDE 0.9 % (FLUSH) 0.9 %
5-40 SYRINGE (ML) INJECTION PRN
Status: CANCELLED | OUTPATIENT
Start: 2023-11-14

## 2023-11-14 RX ORDER — SODIUM CHLORIDE 0.9 % (FLUSH) 0.9 %
5-40 SYRINGE (ML) INJECTION EVERY 12 HOURS SCHEDULED
Status: CANCELLED | OUTPATIENT
Start: 2023-11-14

## 2023-11-14 RX ORDER — SODIUM CHLORIDE 9 MG/ML
INJECTION, SOLUTION INTRAVENOUS PRN
Status: CANCELLED | OUTPATIENT
Start: 2023-11-14

## 2023-11-21 ENCOUNTER — HOSPITAL ENCOUNTER (INPATIENT)
Facility: HOSPITAL | Age: 83
LOS: 1 days | Discharge: HOME | DRG: 274 | End: 2023-11-21
Attending: INTERNAL MEDICINE | Admitting: INTERNAL MEDICINE
Payer: MEDICARE

## 2023-11-21 ENCOUNTER — APPOINTMENT (OUTPATIENT)
Facility: HOSPITAL | Age: 83
DRG: 274 | End: 2023-11-21
Attending: INTERNAL MEDICINE
Payer: MEDICARE

## 2023-11-21 ENCOUNTER — ANESTHESIA (OUTPATIENT)
Facility: HOSPITAL | Age: 83
DRG: 274 | End: 2023-11-21
Payer: MEDICARE

## 2023-11-21 ENCOUNTER — ANESTHESIA EVENT (OUTPATIENT)
Facility: HOSPITAL | Age: 83
DRG: 274 | End: 2023-11-21
Payer: MEDICARE

## 2023-11-21 VITALS
WEIGHT: 170 LBS | BODY MASS INDEX: 24.34 KG/M2 | DIASTOLIC BLOOD PRESSURE: 68 MMHG | SYSTOLIC BLOOD PRESSURE: 122 MMHG | RESPIRATION RATE: 19 BRPM | OXYGEN SATURATION: 96 % | HEIGHT: 70 IN | TEMPERATURE: 98 F | HEART RATE: 75 BPM

## 2023-11-21 DIAGNOSIS — I48.0 AF (PAROXYSMAL ATRIAL FIBRILLATION) (HCC): ICD-10-CM

## 2023-11-21 PROBLEM — Z95.818 PRESENCE OF WATCHMAN LEFT ATRIAL APPENDAGE CLOSURE DEVICE: Status: ACTIVE | Noted: 2023-11-21

## 2023-11-21 LAB
ABO + RH BLD: NORMAL
ACT BLD: 263 SECS (ref 79–138)
BLOOD GROUP ANTIBODIES SERPL: NORMAL
ECHO BSA: 1.95 M2
SPECIMEN EXP DATE BLD: NORMAL

## 2023-11-21 PROCEDURE — 2709999900 HC NON-CHARGEABLE SUPPLY: Performed by: INTERNAL MEDICINE

## 2023-11-21 PROCEDURE — C1760 CLOSURE DEV, VASC: HCPCS | Performed by: INTERNAL MEDICINE

## 2023-11-21 PROCEDURE — 3700000000 HC ANESTHESIA ATTENDED CARE: Performed by: INTERNAL MEDICINE

## 2023-11-21 PROCEDURE — C1766 INTRO/SHEATH,STRBLE,NON-PEEL: HCPCS | Performed by: INTERNAL MEDICINE

## 2023-11-21 PROCEDURE — C1889 IMPLANT/INSERT DEVICE, NOC: HCPCS | Performed by: INTERNAL MEDICINE

## 2023-11-21 PROCEDURE — 2580000003 HC RX 258: Performed by: INTERNAL MEDICINE

## 2023-11-21 PROCEDURE — 6360000002 HC RX W HCPCS: Performed by: NURSE ANESTHETIST, CERTIFIED REGISTERED

## 2023-11-21 PROCEDURE — 02L73DK OCCLUSION OF LEFT ATRIAL APPENDAGE WITH INTRALUMINAL DEVICE, PERCUTANEOUS APPROACH: ICD-10-PCS | Performed by: INTERNAL MEDICINE

## 2023-11-21 PROCEDURE — C1894 INTRO/SHEATH, NON-LASER: HCPCS | Performed by: INTERNAL MEDICINE

## 2023-11-21 PROCEDURE — 6360000002 HC RX W HCPCS: Performed by: INTERNAL MEDICINE

## 2023-11-21 PROCEDURE — 2500000003 HC RX 250 WO HCPCS: Performed by: NURSE ANESTHETIST, CERTIFIED REGISTERED

## 2023-11-21 PROCEDURE — 33340 PERQ CLSR TCAT L ATR APNDGE: CPT | Performed by: INTERNAL MEDICINE

## 2023-11-21 PROCEDURE — 86900 BLOOD TYPING SEROLOGIC ABO: CPT

## 2023-11-21 PROCEDURE — 86850 RBC ANTIBODY SCREEN: CPT

## 2023-11-21 PROCEDURE — 3700000001 HC ADD 15 MINUTES (ANESTHESIA): Performed by: INTERNAL MEDICINE

## 2023-11-21 PROCEDURE — 93355 ECHO TRANSESOPHAGEAL (TEE): CPT

## 2023-11-21 PROCEDURE — 36415 COLL VENOUS BLD VENIPUNCTURE: CPT

## 2023-11-21 PROCEDURE — 1100000003 HC PRIVATE W/ TELEMETRY

## 2023-11-21 PROCEDURE — 85347 COAGULATION TIME ACTIVATED: CPT

## 2023-11-21 PROCEDURE — 76937 US GUIDE VASCULAR ACCESS: CPT | Performed by: INTERNAL MEDICINE

## 2023-11-21 PROCEDURE — 86901 BLOOD TYPING SEROLOGIC RH(D): CPT

## 2023-11-21 DEVICE — LEFT ATRIAL APPENDAGE CLOSURE DEVICE WITH DELIVERY SYSTEM
Type: IMPLANTABLE DEVICE | Status: FUNCTIONAL
Brand: WATCHMAN FLX™

## 2023-11-21 RX ORDER — SODIUM CHLORIDE 0.9 % (FLUSH) 0.9 %
5-40 SYRINGE (ML) INJECTION PRN
Status: DISCONTINUED | OUTPATIENT
Start: 2023-11-21 | End: 2023-11-21 | Stop reason: HOSPADM

## 2023-11-21 RX ORDER — CLOPIDOGREL BISULFATE 75 MG/1
75 TABLET ORAL DAILY
Qty: 90 TABLET | Refills: 1 | Status: SHIPPED | OUTPATIENT
Start: 2023-11-21

## 2023-11-21 RX ORDER — ROCURONIUM BROMIDE 10 MG/ML
INJECTION, SOLUTION INTRAVENOUS PRN
Status: DISCONTINUED | OUTPATIENT
Start: 2023-11-21 | End: 2023-11-21 | Stop reason: SDUPTHER

## 2023-11-21 RX ORDER — SODIUM CHLORIDE 0.9 % (FLUSH) 0.9 %
5-40 SYRINGE (ML) INJECTION EVERY 12 HOURS SCHEDULED
Status: DISCONTINUED | OUTPATIENT
Start: 2023-11-21 | End: 2023-11-21 | Stop reason: HOSPADM

## 2023-11-21 RX ORDER — DEXAMETHASONE SODIUM PHOSPHATE 4 MG/ML
INJECTION, SOLUTION INTRA-ARTICULAR; INTRALESIONAL; INTRAMUSCULAR; INTRAVENOUS; SOFT TISSUE PRN
Status: DISCONTINUED | OUTPATIENT
Start: 2023-11-21 | End: 2023-11-21 | Stop reason: SDUPTHER

## 2023-11-21 RX ORDER — HYDROCODONE BITARTRATE AND ACETAMINOPHEN 5; 325 MG/1; MG/1
1 TABLET ORAL EVERY 6 HOURS PRN
Status: DISCONTINUED | OUTPATIENT
Start: 2023-11-21 | End: 2023-11-21 | Stop reason: HOSPADM

## 2023-11-21 RX ORDER — FENTANYL CITRATE 50 UG/ML
INJECTION, SOLUTION INTRAMUSCULAR; INTRAVENOUS PRN
Status: DISCONTINUED | OUTPATIENT
Start: 2023-11-21 | End: 2023-11-21 | Stop reason: SDUPTHER

## 2023-11-21 RX ORDER — SUCCINYLCHOLINE/SOD CL,ISO/PF 200MG/10ML
SYRINGE (ML) INTRAVENOUS PRN
Status: DISCONTINUED | OUTPATIENT
Start: 2023-11-21 | End: 2023-11-21 | Stop reason: SDUPTHER

## 2023-11-21 RX ORDER — HEPARIN SODIUM 1000 [USP'U]/ML
INJECTION, SOLUTION INTRAVENOUS; SUBCUTANEOUS PRN
Status: DISCONTINUED | OUTPATIENT
Start: 2023-11-21 | End: 2023-11-21 | Stop reason: SDUPTHER

## 2023-11-21 RX ORDER — PROPOFOL 10 MG/ML
INJECTION, EMULSION INTRAVENOUS PRN
Status: DISCONTINUED | OUTPATIENT
Start: 2023-11-21 | End: 2023-11-21 | Stop reason: SDUPTHER

## 2023-11-21 RX ORDER — GLYCOPYRROLATE 0.2 MG/ML
INJECTION, SOLUTION INTRAMUSCULAR; INTRAVENOUS PRN
Status: DISCONTINUED | OUTPATIENT
Start: 2023-11-21 | End: 2023-11-21 | Stop reason: SDUPTHER

## 2023-11-21 RX ORDER — EPHEDRINE SULFATE 50 MG/ML
INJECTION INTRAVENOUS PRN
Status: DISCONTINUED | OUTPATIENT
Start: 2023-11-21 | End: 2023-11-21 | Stop reason: SDUPTHER

## 2023-11-21 RX ORDER — ONDANSETRON 2 MG/ML
4 INJECTION INTRAMUSCULAR; INTRAVENOUS EVERY 6 HOURS PRN
Status: DISCONTINUED | OUTPATIENT
Start: 2023-11-21 | End: 2023-11-21 | Stop reason: HOSPADM

## 2023-11-21 RX ORDER — PHENYLEPHRINE HCL IN 0.9% NACL 0.4MG/10ML
SYRINGE (ML) INTRAVENOUS PRN
Status: DISCONTINUED | OUTPATIENT
Start: 2023-11-21 | End: 2023-11-21 | Stop reason: SDUPTHER

## 2023-11-21 RX ORDER — ASPIRIN 81 MG/1
81 TABLET ORAL DAILY
Qty: 90 TABLET | Refills: 1 | Status: SHIPPED | OUTPATIENT
Start: 2023-11-21

## 2023-11-21 RX ORDER — SODIUM CHLORIDE 9 MG/ML
INJECTION, SOLUTION INTRAVENOUS PRN
Status: DISCONTINUED | OUTPATIENT
Start: 2023-11-21 | End: 2023-11-21 | Stop reason: HOSPADM

## 2023-11-21 RX ORDER — LIDOCAINE HYDROCHLORIDE 20 MG/ML
INJECTION, SOLUTION EPIDURAL; INFILTRATION; INTRACAUDAL; PERINEURAL PRN
Status: DISCONTINUED | OUTPATIENT
Start: 2023-11-21 | End: 2023-11-21 | Stop reason: SDUPTHER

## 2023-11-21 RX ORDER — ONDANSETRON 2 MG/ML
INJECTION INTRAMUSCULAR; INTRAVENOUS PRN
Status: DISCONTINUED | OUTPATIENT
Start: 2023-11-21 | End: 2023-11-21 | Stop reason: SDUPTHER

## 2023-11-21 RX ORDER — PROTAMINE SULFATE 10 MG/ML
INJECTION, SOLUTION INTRAVENOUS PRN
Status: DISCONTINUED | OUTPATIENT
Start: 2023-11-21 | End: 2023-11-21 | Stop reason: SDUPTHER

## 2023-11-21 RX ORDER — ACETAMINOPHEN 325 MG/1
650 TABLET ORAL EVERY 4 HOURS PRN
Status: DISCONTINUED | OUTPATIENT
Start: 2023-11-21 | End: 2023-11-21 | Stop reason: HOSPADM

## 2023-11-21 RX ADMIN — PROTAMINE SULFATE 25 MG: 10 INJECTION, SOLUTION INTRAVENOUS at 12:55

## 2023-11-21 RX ADMIN — ONDANSETRON 4 MG: 2 INJECTION INTRAMUSCULAR; INTRAVENOUS at 12:53

## 2023-11-21 RX ADMIN — PROPOFOL 120 MG: 10 INJECTION, EMULSION INTRAVENOUS at 12:02

## 2023-11-21 RX ADMIN — EPHEDRINE SULFATE 10 MG: 50 INJECTION INTRAVENOUS at 12:39

## 2023-11-21 RX ADMIN — EPHEDRINE SULFATE 10 MG: 50 INJECTION INTRAVENOUS at 12:51

## 2023-11-21 RX ADMIN — FENTANYL CITRATE 50 MCG: 50 INJECTION, SOLUTION INTRAMUSCULAR; INTRAVENOUS at 12:35

## 2023-11-21 RX ADMIN — Medication 200 MCG: at 12:24

## 2023-11-21 RX ADMIN — Medication 120 MG: at 12:02

## 2023-11-21 RX ADMIN — EPHEDRINE SULFATE 10 MG: 50 INJECTION INTRAVENOUS at 12:28

## 2023-11-21 RX ADMIN — GLYCOPYRROLATE 0.2 MG: 0.2 INJECTION, SOLUTION INTRAMUSCULAR; INTRAVENOUS at 12:23

## 2023-11-21 RX ADMIN — PROTAMINE SULFATE 20 MG: 10 INJECTION, SOLUTION INTRAVENOUS at 12:53

## 2023-11-21 RX ADMIN — LIDOCAINE HYDROCHLORIDE 100 MG: 20 INJECTION, SOLUTION EPIDURAL; INFILTRATION; INTRACAUDAL; PERINEURAL at 12:02

## 2023-11-21 RX ADMIN — PROTAMINE SULFATE 5 MG: 10 INJECTION, SOLUTION INTRAVENOUS at 12:51

## 2023-11-21 RX ADMIN — ROCURONIUM BROMIDE 35 MG: 10 INJECTION, SOLUTION INTRAVENOUS at 12:07

## 2023-11-21 RX ADMIN — FENTANYL CITRATE 50 MCG: 50 INJECTION, SOLUTION INTRAMUSCULAR; INTRAVENOUS at 12:02

## 2023-11-21 RX ADMIN — WATER 2000 MG: 1 INJECTION INTRAMUSCULAR; INTRAVENOUS; SUBCUTANEOUS at 12:11

## 2023-11-21 RX ADMIN — ROCURONIUM BROMIDE 5 MG: 10 INJECTION, SOLUTION INTRAVENOUS at 12:02

## 2023-11-21 RX ADMIN — DEXAMETHASONE SODIUM PHOSPHATE 4 MG: 4 INJECTION INTRA-ARTICULAR; INTRALESIONAL; INTRAMUSCULAR; INTRAVENOUS; SOFT TISSUE at 12:02

## 2023-11-21 RX ADMIN — Medication 200 MCG: at 12:21

## 2023-11-21 RX ADMIN — SUGAMMADEX 200 MG: 100 INJECTION, SOLUTION INTRAVENOUS at 12:57

## 2023-11-21 RX ADMIN — SODIUM CHLORIDE: 9 INJECTION, SOLUTION INTRAVENOUS at 11:56

## 2023-11-21 RX ADMIN — HEPARIN SODIUM 10000 UNITS: 1000 INJECTION, SOLUTION INTRAVENOUS; SUBCUTANEOUS at 12:31

## 2023-11-21 NOTE — INTERVAL H&P NOTE
Update History & Physical    The patient's History and Physical of October 31, 2023 was reviewed with the patient and I examined the patient. There was no change. The surgical site was confirmed by the patient and me. Plan: The risks, benefits, expected outcome, and alternative to the recommended procedure have been discussed with the patient. Patient understands and wants to proceed with the procedure.      Electronically signed by Snehal Molina MD on 11/21/2023 at 10:21 AM

## 2023-11-21 NOTE — PROGRESS NOTES
Cardiac Cath Lab Procedure Area Arrival Note:    Diamond Arceo arrived to Cardiac Cath Lab, Procedure Area. Patient identifiers verified with NAME and DATE OF BIRTH. Procedure verified with patient. Consent forms verified. Allergies verified. Patient informed of procedure and plan of care. Questions answered with review. Patient voiced understanding of procedure and plan of care. Patient on cardiac monitor, non-invasive blood pressure, SPO2 monitor. On stretcher to room 3 for MG and DIAMOND closure device under general anesthesia. Patient status doing well without problems. Patient is A&Ox 4. Patient reports no co.     Patient medicated during procedure with orders obtained and verified by Dr. Marizol Chandler. Refer to patients Cardiac Cath Lab PROCEDURE REPORT for vital signs, assessment, status, and response during procedure, printed at end of case. Printed report on chart or scanned into chart.

## 2023-11-21 NOTE — PROGRESS NOTES
Cardiac Cath Lab Recovery Arrival Note:      Amber English arrived to Cardiac Cath Lab, Recovery Area. Staff introduced to patient. Patient identifiers verified with NAME and DATE OF BIRTH. Procedure verified with patient. Consent forms reviewed and signed by patient or authorized representative and verified. Allergies verified. Patient and family oriented to department. Patient and family informed of procedure and plan of care. Questions answered with review. Patient prepped for procedure, per orders from physician, prior to arrival.    Patient on cardiac monitor, non-invasive blood pressure, SPO2 monitor. On RA. Patient is A&Ox 4. Patient reports no complaints. Patient in stretcher, in low position, with side rails up, call bell within reach, patient instructed to call if assistance as needed. Patient prep in: Fast Track 2. Patient family has pager #   Family in: Yarelis Genao, wife [phone].    Prep by: Stevie Bennett

## 2023-11-21 NOTE — ANESTHESIA PROCEDURE NOTES
[facility]    Right Atrium:  Size normal.    Left Atrium:  Size dilated. Left atrial appendage normal.      Septa    Atrial Septum:  Intra-atrial septal morphology normal.      Ventricular Septum:  Intra-ventricular septum morphology normal.          Other Findings  Pericardium:  normal  Pleural Effusion:  none  Pulmonary Arteries:  normal  Pulmonary Venous Flow:  normal    Anesthesia Information  Anesthesiologist:  Lupe Mario MD    Post Intervention Follow-up Study  Comments:  Brief MG exam for watchman procedure  DIAMOND visualized in multiple planes. No clot seen in the DIAMOND. Measurements were taken in multiple planes to size the device. IAS was normal and transseptal was guided by mg. Watchman device was placed. Tug test was normal. Post deployment measurements were taken in various planes. CFD was done ans no leaks around the device were noted. Post deployment the IAS had left to right shunt. No pericardial effusion was seen in the end.

## 2023-11-21 NOTE — PROCEDURES
Cardiac Procedure Note   Patient: Jcarlos Rowe  MRN: [id number]  SSN: [id number]   YOB: 1940 Age: 78 y.o.   Sex: male    Date of Procedure: 11/21/2023   Pre-procedure Diagnosis: Atrial Fibrillation/chronic anemia  Post-procedure Diagnosis: same  Procedure: watchman device implant  :  Dr. Rosibel Glasgow MD    Assistant(s):  None  Anesthesia: general  Estimated Blood Loss: Less than 30 mL   Specimens Removed: None  Findings: 20 mm device  Complications: None   Implants: Watchman device  Signed by:  Rosibel Glasgow MD  11/21/2023  1:04 PM

## 2023-11-21 NOTE — DISCHARGE INSTRUCTIONS
Patient Instructions Post-Watchman implant  Stop eliquis  Take aspirin 81 mg and plavix 75 mg daily for 6 months then aspirin 81 mg daily  Another MG (ultrasound) will be arrange for 6-8 weeks to reevaluate the watchman    1. No heavy lifting or exercises for 1 week. This includes the following:  Do not push or move furniture, jog or run    2. Do not drive for 2 days. 3.  Call Dr. Dee Swan at [phone] if you experience any of the following symptoms:  Dizziness, lightheadedness, fainting spells  Lack of energy  Shortness of breath  Rapid heart rate  Chest or muscle twitches  Blurry vision, double vision, weakness, numbness  Nausea, vomiting  Fever  Bleeding in the stool, black stool  Leg swelling, pain    4. Follow-up with Dr. Bogdan Patel   Date Time Provider [address] [place] [address]   12/11/2023  8:40 AM Homero Rubi M.D.    Electrophysiology/Cardiology   [facility] [place]   [address] [place] [address],[facility] Floor   [address] [place] [address], [address] [place] [address]                             14[address] [facility]   [phone]

## 2023-11-21 NOTE — PROGRESS NOTES
1515 Ambulated patient to the bathroom with a steady gait, voided without difficulty. Patient denies chest pain, shortness of breath, or dizziness. Returned to stretcher. Vital signs stable. 1520 Procedural site is clean, dry, and intact. No bleeding, no hematoma. 1530 Patient dressed self. 12 Educated patient about their sedation precautions such as not driving, operating any machinery, or signing legal documents 24 hours post procedure. Reviewed discharge instructions, including medications and site care using the teach back method. Answered all questions. Verbalized understanding. 1540  Removed peripheral IV.    1545 Escorted to discharge area in a wheelchair with all of their belongings. Patient's spouse present to take patient home.

## 2023-11-21 NOTE — DISCHARGE SUMMARY
medications on file   Discontinued Medications     No medications on file         Assessment/Plan     Anticoagulation:   [id number] 3 (age, HTN), HAS-BLED 5 (age, HTN, predisposition to bleeding, mild renal failure, alcohol). Now s/p Watchman on 11/21/2023. Stop Eliquis   Start Plavix 75 mg by mouth daily & ASA 81 mg po daily. If MG >45 days post procedure confirms complete DIAMOND closure, would stop DAPT after 6 months, then continue ASA 81 mg po daily alone thereafter. Persistent AF/typical AFL: S/p ablation in 01/2020. Post ablation holter showed no AF/AFL. No known recurrence. No longer on meds for rate/rhythm control. CAD: LAD & LCx calcification noted on chest CT. Nuclear stress test in 12/2020 showed no ischemia. No angina. Not on a statin,  in 02/2023, managed by PCP. RBBB: Noted on prior ECG. HTN: BP controlled. Continue current medication regimen. Follow up in EP clinic as noted below. Future Appointments   Date Time Provider [address] [place] [address]   12/11/2023  8:40 AM Mike Bear M.D.   Electrophysiology/Cardiology  [facility] Cardiology  [address]                 [facility], [address] [place] [address], [address] [place] [phone]

## 2023-11-24 LAB — ECHO BSA: 1.95 M2

## 2023-12-11 ENCOUNTER — OFFICE VISIT (OUTPATIENT)
Age: 83
End: 2023-12-11
Payer: MEDICARE

## 2023-12-11 VITALS
DIASTOLIC BLOOD PRESSURE: 72 MMHG | WEIGHT: 174.8 LBS | HEIGHT: 70 IN | BODY MASS INDEX: 25.03 KG/M2 | SYSTOLIC BLOOD PRESSURE: 120 MMHG | HEART RATE: 91 BPM | OXYGEN SATURATION: 99 %

## 2023-12-11 DIAGNOSIS — Z98.890 S/P ABLATION OF ATRIAL FIBRILLATION: ICD-10-CM

## 2023-12-11 DIAGNOSIS — I48.3 TYPICAL ATRIAL FLUTTER (HCC): ICD-10-CM

## 2023-12-11 DIAGNOSIS — I25.10 CORONARY ARTERY DISEASE INVOLVING NATIVE CORONARY ARTERY OF NATIVE HEART WITHOUT ANGINA PECTORIS: ICD-10-CM

## 2023-12-11 DIAGNOSIS — D64.9 ANEMIA, UNSPECIFIED TYPE: ICD-10-CM

## 2023-12-11 DIAGNOSIS — Z95.818 PRESENCE OF WATCHMAN LEFT ATRIAL APPENDAGE CLOSURE DEVICE: Primary | ICD-10-CM

## 2023-12-11 DIAGNOSIS — I48.19 PERSISTENT ATRIAL FIBRILLATION (HCC): ICD-10-CM

## 2023-12-11 DIAGNOSIS — I10 PRIMARY HYPERTENSION: ICD-10-CM

## 2023-12-11 DIAGNOSIS — I45.10 RBBB: ICD-10-CM

## 2023-12-11 DIAGNOSIS — Z86.79 S/P ABLATION OF ATRIAL FIBRILLATION: ICD-10-CM

## 2023-12-11 PROCEDURE — 99214 OFFICE O/P EST MOD 30 MIN: CPT | Performed by: NURSE PRACTITIONER

## 2023-12-11 ASSESSMENT — PATIENT HEALTH QUESTIONNAIRE - PHQ9
SUM OF ALL RESPONSES TO PHQ9 QUESTIONS 1 & 2: 0
2. FEELING DOWN, DEPRESSED OR HOPELESS: 0
SUM OF ALL RESPONSES TO PHQ QUESTIONS 1-9: 0
1. LITTLE INTEREST OR PLEASURE IN DOING THINGS: 0
SUM OF ALL RESPONSES TO PHQ QUESTIONS 1-9: 0

## 2023-12-11 NOTE — PATIENT INSTRUCTIONS
Your Post Watchman MG procedure has been scheduled for 03/28/2024 at 09:30am, at [facility].    Please report to Admitting Department by 8:00am.  Please bring a list of your current medications and medication bottles, if able, to the hospital on this day. You will be unable to drive after your procedure so please make sure to bring someone with you to your procedure. You will need to have nothing to eat or drink after midnight, the night prior to your procedure. You may have small sips of water, if needed, to take with your medication. You will need labs drawn prior to your procedure. Please go to have this done AFTER 02/8/2024. You should NOT stop your medication prior to your scheduled procedure. After your procedure, you will need to follow up with Dr. Rebecca Buckley.  Your follow-up appointment has been scheduled for 6/11/24 at 8:00 am.

## 2023-12-18 DIAGNOSIS — I48.19 PERSISTENT ATRIAL FIBRILLATION (HCC): ICD-10-CM

## 2023-12-19 LAB
BASOPHILS # BLD: 0.1 K/UL (ref 0–0.1)
BASOPHILS NFR BLD: 1 % (ref 0–1)
DIFFERENTIAL METHOD BLD: ABNORMAL
EOSINOPHIL # BLD: 0.2 K/UL (ref 0–0.4)
EOSINOPHIL NFR BLD: 3 % (ref 0–7)
ERYTHROCYTE [DISTWIDTH] IN BLOOD BY AUTOMATED COUNT: 15.5 % (ref 11.5–14.5)
HCT VFR BLD AUTO: 29.8 % (ref 36.6–50.3)
HGB BLD-MCNC: 8.3 G/DL (ref 12.1–17)
IMM GRANULOCYTES # BLD AUTO: 0 K/UL (ref 0–0.04)
IMM GRANULOCYTES NFR BLD AUTO: 0 % (ref 0–0.5)
LYMPHOCYTES # BLD: 1.1 K/UL (ref 0.8–3.5)
LYMPHOCYTES NFR BLD: 15 % (ref 12–49)
MCH RBC QN AUTO: 24.1 PG (ref 26–34)
MCHC RBC AUTO-ENTMCNC: 27.9 G/DL (ref 30–36.5)
MCV RBC AUTO: 86.4 FL (ref 80–99)
MONOCYTES # BLD: 0.7 K/UL (ref 0–1)
MONOCYTES NFR BLD: 10 % (ref 5–13)
NEUTS SEG # BLD: 5.3 K/UL (ref 1.8–8)
NEUTS SEG NFR BLD: 71 % (ref 32–75)
NRBC # BLD: 0 K/UL (ref 0–0.01)
NRBC BLD-RTO: 0 PER 100 WBC
PLATELET # BLD AUTO: ABNORMAL K/UL (ref 150–400)
RBC # BLD AUTO: 3.45 M/UL (ref 4.1–5.7)
RBC MORPH BLD: ABNORMAL
RBC MORPH BLD: ABNORMAL
WBC # BLD AUTO: 7.4 K/UL (ref 4.1–11.1)

## 2024-02-20 RX ORDER — FUROSEMIDE 20 MG/1
TABLET ORAL
Qty: 90 TABLET | Refills: 1 | Status: SHIPPED | OUTPATIENT
Start: 2024-02-20

## 2024-02-20 NOTE — TELEPHONE ENCOUNTER
VO per MD    Future Appointments   Date Time Provider Department Center   3/28/2024  9:30 AM [facility] STRESS ECHO LAB 1 [facility]   6/11/2024  8:00 AM Hemant Keenan MD [facility]

## 2024-03-11 DIAGNOSIS — I48.19 PERSISTENT ATRIAL FIBRILLATION (HCC): ICD-10-CM

## 2024-03-13 ENCOUNTER — TELEPHONE (OUTPATIENT)
Age: 84
End: 2024-03-13

## 2024-03-13 LAB
ANION GAP SERPL CALC-SCNC: 3 MMOL/L (ref 5–15)
BASOPHILS # BLD: 0.1 K/UL (ref 0–0.1)
BASOPHILS NFR BLD: 1 % (ref 0–1)
BUN SERPL-MCNC: 14 MG/DL (ref 6–20)
BUN/CREAT SERPL: 10 (ref 12–20)
CALCIUM SERPL-MCNC: 9.2 MG/DL (ref 8.5–10.1)
CHLORIDE SERPL-SCNC: 106 MMOL/L (ref 97–108)
CO2 SERPL-SCNC: 27 MMOL/L (ref 21–32)
CREAT SERPL-MCNC: 1.39 MG/DL (ref 0.7–1.3)
DIFFERENTIAL METHOD BLD: ABNORMAL
EOSINOPHIL # BLD: 0.3 K/UL (ref 0–0.4)
EOSINOPHIL NFR BLD: 4 % (ref 0–7)
ERYTHROCYTE [DISTWIDTH] IN BLOOD BY AUTOMATED COUNT: 23.5 % (ref 11.5–14.5)
GLUCOSE SERPL-MCNC: 100 MG/DL (ref 65–100)
HCT VFR BLD AUTO: 35.4 % (ref 36.6–50.3)
HGB BLD-MCNC: 10.5 G/DL (ref 12.1–17)
IMM GRANULOCYTES # BLD AUTO: 0 K/UL (ref 0–0.04)
IMM GRANULOCYTES NFR BLD AUTO: 0 % (ref 0–0.5)
LYMPHOCYTES # BLD: 1.4 K/UL (ref 0.8–3.5)
LYMPHOCYTES NFR BLD: 21 % (ref 12–49)
MCH RBC QN AUTO: 24.3 PG (ref 26–34)
MCHC RBC AUTO-ENTMCNC: 29.7 G/DL (ref 30–36.5)
MCV RBC AUTO: 81.9 FL (ref 80–99)
MONOCYTES # BLD: 1.1 K/UL (ref 0–1)
MONOCYTES NFR BLD: 16 % (ref 5–13)
NEUTS SEG # BLD: 3.7 K/UL (ref 1.8–8)
NEUTS SEG NFR BLD: 58 % (ref 32–75)
NRBC # BLD: 0 K/UL (ref 0–0.01)
NRBC BLD-RTO: 0 PER 100 WBC
PLATELET # BLD AUTO: 128 K/UL (ref 150–400)
POTASSIUM SERPL-SCNC: 4.4 MMOL/L (ref 3.5–5.1)
RBC # BLD AUTO: 4.32 M/UL (ref 4.1–5.7)
RBC MORPH BLD: ABNORMAL
SODIUM SERPL-SCNC: 136 MMOL/L (ref 136–145)
WBC # BLD AUTO: 6.6 K/UL (ref 4.1–11.1)

## 2024-03-13 NOTE — TELEPHONE ENCOUNTER
----- Message from RENÉ Saul NP sent at 3/13/2024  9:08 AM EDT -----  Cr mildly elevated, similar to previous.  Anemic but improved.  No change in treatment plan based on results.

## 2024-03-21 ENCOUNTER — PREP FOR PROCEDURE (OUTPATIENT)
Age: 84
End: 2024-03-21

## 2024-03-22 RX ORDER — SODIUM CHLORIDE 0.9 % (FLUSH) 0.9 %
5-40 SYRINGE (ML) INJECTION PRN
Status: CANCELLED | OUTPATIENT
Start: 2024-03-22

## 2024-03-22 RX ORDER — SODIUM CHLORIDE 0.9 % (FLUSH) 0.9 %
5-40 SYRINGE (ML) INJECTION EVERY 12 HOURS SCHEDULED
Status: CANCELLED | OUTPATIENT
Start: 2024-03-22

## 2024-03-22 RX ORDER — SODIUM CHLORIDE 9 MG/ML
INJECTION, SOLUTION INTRAVENOUS PRN
Status: CANCELLED | OUTPATIENT
Start: 2024-03-22

## 2024-03-28 ENCOUNTER — HOSPITAL ENCOUNTER (OUTPATIENT)
Facility: HOSPITAL | Age: 84
Discharge: HOME | End: 2024-03-30
Attending: INTERNAL MEDICINE
Payer: MEDICARE

## 2024-03-28 ENCOUNTER — ANESTHESIA EVENT (OUTPATIENT)
Facility: HOSPITAL | Age: 84
End: 2024-03-28
Payer: MEDICARE

## 2024-03-28 ENCOUNTER — ANESTHESIA (OUTPATIENT)
Facility: HOSPITAL | Age: 84
End: 2024-03-28
Payer: MEDICARE

## 2024-03-28 VITALS
WEIGHT: 172 LBS | DIASTOLIC BLOOD PRESSURE: 64 MMHG | HEIGHT: 70 IN | OXYGEN SATURATION: 97 % | HEART RATE: 55 BPM | BODY MASS INDEX: 24.62 KG/M2 | SYSTOLIC BLOOD PRESSURE: 120 MMHG | RESPIRATION RATE: 15 BRPM | TEMPERATURE: 97.6 F

## 2024-03-28 DIAGNOSIS — I48.0 PAROXYSMAL ATRIAL FIBRILLATION (HCC): ICD-10-CM

## 2024-03-28 LAB — ECHO BSA: 1.96 M2

## 2024-03-28 PROCEDURE — 6360000002 HC RX W HCPCS: Performed by: NURSE ANESTHETIST, CERTIFIED REGISTERED

## 2024-03-28 PROCEDURE — 93325 DOPPLER ECHO COLOR FLOW MAPG: CPT

## 2024-03-28 PROCEDURE — 2580000003 HC RX 258: Performed by: NURSE ANESTHETIST, CERTIFIED REGISTERED

## 2024-03-28 PROCEDURE — 2500000003 HC RX 250 WO HCPCS: Performed by: NURSE ANESTHETIST, CERTIFIED REGISTERED

## 2024-03-28 PROCEDURE — 3700000000 HC ANESTHESIA ATTENDED CARE

## 2024-03-28 RX ORDER — CLOPIDOGREL BISULFATE 75 MG/1
75 TABLET ORAL DAILY
Qty: 90 TABLET | Refills: 1 | Status: SHIPPED
Start: 2024-03-28 | End: 2024-05-21

## 2024-03-28 RX ORDER — ACETAMINOPHEN 325 MG/1
650 TABLET ORAL EVERY 4 HOURS PRN
Status: DISCONTINUED | OUTPATIENT
Start: 2024-03-28 | End: 2024-03-31 | Stop reason: HOSPADM

## 2024-03-28 RX ORDER — SODIUM CHLORIDE 9 MG/ML
INJECTION, SOLUTION INTRAVENOUS CONTINUOUS PRN
Status: DISCONTINUED | OUTPATIENT
Start: 2024-03-28 | End: 2024-03-28 | Stop reason: SDUPTHER

## 2024-03-28 RX ORDER — PROPOFOL 10 MG/ML
INJECTION, EMULSION INTRAVENOUS
Status: COMPLETED
Start: 2024-03-28 | End: 2024-03-28

## 2024-03-28 RX ORDER — ONDANSETRON 2 MG/ML
4 INJECTION INTRAMUSCULAR; INTRAVENOUS EVERY 6 HOURS PRN
Status: DISCONTINUED | OUTPATIENT
Start: 2024-03-28 | End: 2024-03-31 | Stop reason: HOSPADM

## 2024-03-28 RX ORDER — LIDOCAINE HYDROCHLORIDE 20 MG/ML
INJECTION, SOLUTION EPIDURAL; INFILTRATION; INTRACAUDAL; PERINEURAL
Status: COMPLETED
Start: 2024-03-28 | End: 2024-03-28

## 2024-03-28 RX ORDER — LIDOCAINE HYDROCHLORIDE 20 MG/ML
INJECTION, SOLUTION EPIDURAL; INFILTRATION; INTRACAUDAL; PERINEURAL PRN
Status: DISCONTINUED | OUTPATIENT
Start: 2024-03-28 | End: 2024-03-28 | Stop reason: SDUPTHER

## 2024-03-28 RX ADMIN — PROPOFOL 30 MG: 10 INJECTION, EMULSION INTRAVENOUS at 09:32

## 2024-03-28 RX ADMIN — PROPOFOL 20 MG: 10 INJECTION, EMULSION INTRAVENOUS at 09:34

## 2024-03-28 RX ADMIN — PROPOFOL 50 MG: 10 INJECTION, EMULSION INTRAVENOUS at 09:31

## 2024-03-28 RX ADMIN — LIDOCAINE HYDROCHLORIDE 100 MG: 20 INJECTION, SOLUTION EPIDURAL; INFILTRATION; INTRACAUDAL; PERINEURAL at 09:31

## 2024-03-28 RX ADMIN — SODIUM CHLORIDE: 9 INJECTION, SOLUTION INTRAVENOUS at 09:28

## 2024-03-28 NOTE — PROCEDURES
Cardiac Procedure Note   Patient: Toni Crook  MRN: [id number]  SSN: [id number]   YOB: 1940 Age: 84 y.o.  Sex: male    Date of Procedure: 3/28/2024   Pre-procedure Diagnosis: Atrial Fibrillation/watchman  Post-procedure Diagnosis: same  Procedure: MG  :  Dr. Hemant Keenan MD    Assistant(s):  None  Anesthesia: Moderate Sedation by CRNA  Estimated Blood Loss: none  Specimens Removed: None  Findings: watchman device is endothelialized, difficult to see but no leak on color doppler  Normal LVEF  Mild MR  Mild LAE  Complications: None   Implants:  None  Signed by:  Hemant Keenan MD  3/28/2024  10:01 AM

## 2024-03-28 NOTE — ANESTHESIA PRE PROCEDURE
(If Applicable): No results found for: \"COVID19\"        Anesthesia Evaluation  Patient summary reviewed and Nursing notes reviewed  Airway: Mallampati: II  TM distance: >3 FB   Neck ROM: full  Mouth opening: > = 3 FB   Dental: normal exam   (+) partials      Pulmonary:Negative Pulmonary ROS and normal exam                               Cardiovascular:  Exercise tolerance: good (>4 METS)  (+) hypertension:, dysrhythmias: atrial fibrillation and atrial flutter               ROS comment: S/p Watchman     Neuro/Psych:               GI/Hepatic/Renal:   (+) renal disease: CRI          Endo/Other:    (+) blood dyscrasia: thrombocytopenia:..                 Abdominal: normal exam            Vascular:          Other Findings:         Anesthesia Plan      MAC     ASA 3       Induction: intravenous.      Anesthetic plan and risks discussed with patient.      Plan discussed with CRNA.    Attending anesthesiologist reviewed and agrees with Preprocedure content            Roque Miller MD   3/28/2024

## 2024-03-28 NOTE — DISCHARGE INSTRUCTIONS
Discharge Instructions Post MG    No driving x 24 hours due to sedation medication that you received during your procedure.  Resume medications as previously prescribed for now.  You may stop Plavix on 05/20/2024 & continue with aspirin 81 mg daily indefinitely thereafter.  Follow up as noted below.    Future Appointments   Date Time Provider Department Center   3/28/2024  9:30 AM [facility] STRESS ECHO LAB 1 [facility]   6/11/2024  8:00 AM Hemant Keenan MD [facility] AMB       Hemant Keenan M.D.  Electrophysiology/Cardiology  [facility] Cardiology  [address]  [address] [facility] [address]                [place] [address]                  [place] [address] [phone]

## 2024-03-28 NOTE — PROGRESS NOTES
Patient has returned to baseline at arrival for procedure.    Discussed AVS and discharge instructions with patient and provided a copy take home.    Pt voiced understanding and denied any questions or need for clarification.    IV D/C'd and hemostasis attained. Coban and gauze dressing applied.    Transported with:  Nurse via W/C to vehicle driven by wife

## 2024-03-28 NOTE — H&P
[facility] Cardiology  Cardiac Electrophysiology H&P Note                  []Initial visit     [x]Established visit     Patient Name: Toni Crook - :1940 - MRN:[id number]  Primary Cardiologist: None  Electrophysiologist: Hemant Keenan MD     Reason for visit: Post Watchman MG    HPI:  Mr. Crook is a 84 y.o. male who presents for follow up s/p Watchman on 2023.    Post Watchman, he stopped Eliquis & started DAPT.  He reports doing well, denies chest pain, palpitations, SOB, PND, orthopnea, or syncope.      MG in 2023 showed LVEF 60-65% with moderately dilated LA, mildly dilated RV & RA, & mild MR with centrally directed jet.    BP well controlled.    He did have significant iron deficiency anemia 2 years ago, had negative Cologuard.  Received iron infusions & now on iron supplement.  States this have improved, followed closely by Dr. Matthews.  Last Hgb 10.5, but has been as low as 8.3 in the past year.      Previous:  S/p Watchman 2023.    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  Post ablation holter showed no AF.  Not currently on medication for rate or rhythm control, had 1st degree AVB & RBBB on last ECG in 2021.      Chest CTA in  and  showed LAD and LCx calcification.     Followed by Dr. Clay Schneider.        Assessment and Plan     Anticoagulation: [id number] 3 (age, HTN), HAS-BLED 5 (age, HTN, predisposition to bleeding, mild renal failure, alcohol).  Now s/p Watchman on 2023.    Continues Plavix 75 mg po daily & ASA 81 mg po daily for now.  If MG >45 days post procedure confirms complete DIAMOND closure, would stop DAPT after 6 months, then continue ASA 81 mg po daily alone thereafter.     Reviewed risks/benefits of MG.  He agrees to proceed as scheduled.    Anemia: Hgb 8.3-12.3 over the past year.  No known site of blood loss.  Followed by Dr. Matthews.  Thrombocytopenic (last plt 128).    Persistent AF/typical AFL: S/p ablation

## 2024-03-29 NOTE — ANESTHESIA POSTPROCEDURE EVALUATION
Post-Anesthesia Evaluation and Assessment    Patient: Toni Crook MRN: [id number]  SSN: [id number]    YOB: 1940  Age: 84 y.o.  Sex: male      I have evaluated the patient and they are stable and ready for discharge from the PACU.     Cardiovascular Function/Vital Signs  Visit Vitals  /64   Pulse 55   Temp 97.6 °F (36.4 °C) (Axillary)   Resp 15   Ht 1.778 m (5' 10\")   Wt 78 kg (172 lb)   SpO2 97%   BMI 24.68 kg/m²       Patient is status post * No anesthesia type entered * anesthesia for * No procedures listed *.    Nausea/Vomiting: None    Postoperative hydration reviewed and adequate.    Pain:      Managed    Neurological Status:       At baseline    Mental Status, Level of Consciousness: Alert and  oriented to person, place, and time    Pulmonary Status:       Adequate oxygenation and airway patent    Complications related to anesthesia: None    Post-anesthesia assessment completed. No concerns    Signed By: Roque Miller MD     March 29, 2024            Department of Anesthesiology  Postprocedure Note    Patient: Toni Crook  MRN: [id number]  YOB: 1940  Date of evaluation: 3/29/2024    Procedure Summary       Date: 03/28/24 Room / Location: [facility]-INVASIVE CARDIOLOGY    Anesthesia Start: 0930 Anesthesia Stop: 0940    Procedure: MG (PRN CONTRAST/BUBBLE/3D) Diagnosis: Paroxysmal atrial fibrillation (HCC)    Scheduled Providers: Hemant Keenan MD; Roque Miller MD Responsible Provider: Roque Miller MD    Anesthesia Type: MAC ASA Status: 3            Anesthesia Type: MAC    Raven Phase I: Raven Score: 10    Raven Phase II:      Anesthesia Post Evaluation    No notable events documented.

## 2024-05-06 RX ORDER — LISINOPRIL 20 MG/1
20 TABLET ORAL DAILY
Qty: 90 TABLET | Refills: 1 | Status: SHIPPED | OUTPATIENT
Start: 2024-05-06

## 2024-05-06 NOTE — TELEPHONE ENCOUNTER
Requested Prescriptions     Pending Prescriptions Disp Refills    lisinopril (PRINIVIL;ZESTRIL) 20 MG tablet [Pharmacy Med Name: LISINOPRIL 20MG TABLETS] 90 tablet 1     Sig: TAKE 1 TABLET BY MOUTH DAILY       Med refilled per VO by MD.    Future Appointments   Date Time Provider Department Center   6/11/2024  8:00 AM Hemant Keenan MD [facility] AM[facility]

## 2024-05-08 ENCOUNTER — TELEPHONE (OUTPATIENT)
Age: 84
End: 2024-05-08

## 2024-05-08 NOTE — TELEPHONE ENCOUNTER
Lm to reschedule 6/11 appt with Dr Keenan, provider vacation. Please schedule to next available with Monika or Yesy.

## 2024-06-06 NOTE — PROGRESS NOTES
[facility] Cardiology  Cardiac Electrophysiology Clinic Note                  []Initial visit     [x]Established visit     Patient Name: Toni Crook - :1940 - MRN:[id number]  Primary Cardiologist: None  Electrophysiologist: Hemant Keenan MD     Reason for visit: Post Watchman follow up    HPI:  Mr. Crook is a 84 y.o. male who presents for follow up, is s/p Watchman on 2023.    Post Watchman, he stopped Eliquis & started DAPT.  Post Watchman MG in 2024 showed endothelialized Watchman device, no leak or clot.  Still on DAPT.    MG in 2024 showed LVEF 55-60% with mildly dilated LA & mild MR.    BP well controlled.    He did have significant iron deficiency anemia 2 years ago, had negative Cologuard. Received iron infusions & now on iron supplement.  States this have improved, followed closely by Dr. Matthews.        Previous:  S/p Watchman 2023.  MG in 2024 showed endothelialized Watchman without leak or thrombus.    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  Post ablation holter showed no AF.  Not currently on medication for rate or rhythm control, had 1st degree AVB & RBBB on last ECG in 2021.      Chest CTA in  and  showed LAD and LCx calcification.     Followed by Dr. Clay Schneider.        Assessment and Plan     Anticoagulation: [id number] 3 (age, HTN), HAS-BLED 5 (age, HTN, predisposition to bleeding, mild renal failure, alcohol).  Now s/p Watchman on 2023, had complete closure confirmed via post Watchman MG.    Stop Plavix.  Continue ASA 81 mg po daily indefinitely.    Anemia: Hgb 8.3-12.3 over the past year.  No known site of blood loss.  Followed by Dr. Matthews.  Thrombocytopenic (last plt 128).  Considering EGD & colonoscopy.    Persistent AF/typical AFL: S/p ablation in 2020.  Post ablation holter showed no AF/AFL.  No known recurrence.  No longer on meds for rate/rhythm control.     CAD: LAD & LCx calcification noted

## 2024-06-12 ENCOUNTER — OFFICE VISIT (OUTPATIENT)
Age: 84
End: 2024-06-12
Payer: MEDICARE

## 2024-06-12 VITALS
WEIGHT: 171 LBS | HEIGHT: 70 IN | SYSTOLIC BLOOD PRESSURE: 132 MMHG | BODY MASS INDEX: 24.48 KG/M2 | OXYGEN SATURATION: 98 % | DIASTOLIC BLOOD PRESSURE: 74 MMHG | HEART RATE: 73 BPM

## 2024-06-12 DIAGNOSIS — Z95.818 PRESENCE OF WATCHMAN LEFT ATRIAL APPENDAGE CLOSURE DEVICE: ICD-10-CM

## 2024-06-12 DIAGNOSIS — I48.19 PERSISTENT ATRIAL FIBRILLATION (HCC): ICD-10-CM

## 2024-06-12 DIAGNOSIS — I45.10 RBBB: ICD-10-CM

## 2024-06-12 DIAGNOSIS — I48.3 TYPICAL ATRIAL FLUTTER (HCC): ICD-10-CM

## 2024-06-12 DIAGNOSIS — I25.10 CORONARY ARTERY DISEASE INVOLVING NATIVE CORONARY ARTERY OF NATIVE HEART WITHOUT ANGINA PECTORIS: ICD-10-CM

## 2024-06-12 DIAGNOSIS — Z79.01 ANTICOAGULATED: Primary | ICD-10-CM

## 2024-06-12 DIAGNOSIS — I10 PRIMARY HYPERTENSION: ICD-10-CM

## 2024-06-12 DIAGNOSIS — D64.9 ANEMIA, UNSPECIFIED TYPE: ICD-10-CM

## 2024-06-12 PROCEDURE — 1036F TOBACCO NON-USER: CPT | Performed by: NURSE PRACTITIONER

## 2024-06-12 PROCEDURE — 1123F ACP DISCUSS/DSCN MKR DOCD: CPT | Performed by: NURSE PRACTITIONER

## 2024-06-12 PROCEDURE — 99214 OFFICE O/P EST MOD 30 MIN: CPT | Performed by: NURSE PRACTITIONER

## 2024-06-12 PROCEDURE — G8427 DOCREV CUR MEDS BY ELIG CLIN: HCPCS | Performed by: NURSE PRACTITIONER

## 2024-06-12 PROCEDURE — G8420 CALC BMI NORM PARAMETERS: HCPCS | Performed by: NURSE PRACTITIONER

## 2024-06-12 ASSESSMENT — PATIENT HEALTH QUESTIONNAIRE - PHQ9
SUM OF ALL RESPONSES TO PHQ QUESTIONS 1-9: 0
SUM OF ALL RESPONSES TO PHQ QUESTIONS 1-9: 0
2. FEELING DOWN, DEPRESSED OR HOPELESS: NOT AT ALL
SUM OF ALL RESPONSES TO PHQ9 QUESTIONS 1 & 2: 0
1. LITTLE INTEREST OR PLEASURE IN DOING THINGS: NOT AT ALL
SUM OF ALL RESPONSES TO PHQ QUESTIONS 1-9: 0
SUM OF ALL RESPONSES TO PHQ QUESTIONS 1-9: 0

## 2024-09-03 ENCOUNTER — PATIENT MESSAGE (OUTPATIENT)
Age: 84
End: 2024-09-03

## 2024-09-04 ENCOUNTER — CLINICAL DOCUMENTATION (OUTPATIENT)
Age: 84
End: 2024-09-04

## 2024-09-04 NOTE — PROGRESS NOTES
Usually we give aspirin for watchman (Nov 2023) and you have coronary artery disease but not obstructive  He said Dr Matthews think aspirin can cause him to lose blood/anemic then he can stop

## 2024-09-23 ENCOUNTER — ANESTHESIA EVENT (OUTPATIENT)
Facility: HOSPITAL | Age: 84
End: 2024-09-23
Payer: MEDICARE

## 2024-09-23 ENCOUNTER — ANESTHESIA (OUTPATIENT)
Facility: HOSPITAL | Age: 84
End: 2024-09-23
Payer: MEDICARE

## 2024-09-23 ENCOUNTER — HOSPITAL ENCOUNTER (OUTPATIENT)
Facility: HOSPITAL | Age: 84
Setting detail: OUTPATIENT SURGERY
Discharge: HOME | End: 2024-09-23
Attending: INTERNAL MEDICINE | Admitting: INTERNAL MEDICINE
Payer: MEDICARE

## 2024-09-23 VITALS
TEMPERATURE: 97.9 F | OXYGEN SATURATION: 97 % | WEIGHT: 163.9 LBS | RESPIRATION RATE: 21 BRPM | BODY MASS INDEX: 23.46 KG/M2 | HEART RATE: 79 BPM | HEIGHT: 70 IN | DIASTOLIC BLOOD PRESSURE: 51 MMHG | SYSTOLIC BLOOD PRESSURE: 103 MMHG

## 2024-09-23 PROCEDURE — 7100000011 HC PHASE II RECOVERY - ADDTL 15 MIN: Performed by: INTERNAL MEDICINE

## 2024-09-23 PROCEDURE — 3600007502: Performed by: INTERNAL MEDICINE

## 2024-09-23 PROCEDURE — 2580000003 HC RX 258: Performed by: INTERNAL MEDICINE

## 2024-09-23 PROCEDURE — C1726 CATH, BAL DIL, NON-VASCULAR: HCPCS | Performed by: INTERNAL MEDICINE

## 2024-09-23 PROCEDURE — 3700000000 HC ANESTHESIA ATTENDED CARE: Performed by: INTERNAL MEDICINE

## 2024-09-23 PROCEDURE — 3700000001 HC ADD 15 MINUTES (ANESTHESIA): Performed by: INTERNAL MEDICINE

## 2024-09-23 PROCEDURE — 6370000000 HC RX 637 (ALT 250 FOR IP): Performed by: INTERNAL MEDICINE

## 2024-09-23 PROCEDURE — 88305 TISSUE EXAM BY PATHOLOGIST: CPT

## 2024-09-23 PROCEDURE — 88342 IMHCHEM/IMCYTCHM 1ST ANTB: CPT

## 2024-09-23 PROCEDURE — 3600007512: Performed by: INTERNAL MEDICINE

## 2024-09-23 PROCEDURE — 6360000002 HC RX W HCPCS

## 2024-09-23 PROCEDURE — 7100000010 HC PHASE II RECOVERY - FIRST 15 MIN: Performed by: INTERNAL MEDICINE

## 2024-09-23 PROCEDURE — 2500000003 HC RX 250 WO HCPCS

## 2024-09-23 PROCEDURE — 2709999900 HC NON-CHARGEABLE SUPPLY: Performed by: INTERNAL MEDICINE

## 2024-09-23 RX ORDER — GLYCOPYRROLATE 0.2 MG/ML
INJECTION INTRAMUSCULAR; INTRAVENOUS
Status: DISCONTINUED | OUTPATIENT
Start: 2024-09-23 | End: 2024-09-23 | Stop reason: SDUPTHER

## 2024-09-23 RX ORDER — SIMETHICONE 40MG/0.6ML
SUSPENSION, DROPS(FINAL DOSAGE FORM)(ML) ORAL PRN
Status: DISCONTINUED | OUTPATIENT
Start: 2024-09-23 | End: 2024-09-23 | Stop reason: ALTCHOICE

## 2024-09-23 RX ORDER — SODIUM CHLORIDE 0.9 % (FLUSH) 0.9 %
5-40 SYRINGE (ML) INJECTION PRN
Status: DISCONTINUED | OUTPATIENT
Start: 2024-09-23 | End: 2024-09-23 | Stop reason: HOSPADM

## 2024-09-23 RX ORDER — EPHEDRINE SULFATE/0.9% NACL/PF 50 MG/5 ML
SYRINGE (ML) INTRAVENOUS
Status: DISCONTINUED | OUTPATIENT
Start: 2024-09-23 | End: 2024-09-23 | Stop reason: SDUPTHER

## 2024-09-23 RX ORDER — PHENYLEPHRINE HCL IN 0.9% NACL 0.4MG/10ML
SYRINGE (ML) INTRAVENOUS
Status: DISCONTINUED | OUTPATIENT
Start: 2024-09-23 | End: 2024-09-23 | Stop reason: SDUPTHER

## 2024-09-23 RX ORDER — LIDOCAINE HYDROCHLORIDE 20 MG/ML
INJECTION, SOLUTION EPIDURAL; INFILTRATION; INTRACAUDAL; PERINEURAL
Status: DISCONTINUED | OUTPATIENT
Start: 2024-09-23 | End: 2024-09-23 | Stop reason: SDUPTHER

## 2024-09-23 RX ORDER — SIMETHICONE 40MG/0.6ML
40 SUSPENSION, DROPS(FINAL DOSAGE FORM)(ML) ORAL AS NEEDED
Status: DISCONTINUED | OUTPATIENT
Start: 2024-09-23 | End: 2024-09-23 | Stop reason: HOSPADM

## 2024-09-23 RX ORDER — SODIUM CHLORIDE 9 MG/ML
INJECTION, SOLUTION INTRAVENOUS CONTINUOUS
Status: DISCONTINUED | OUTPATIENT
Start: 2024-09-23 | End: 2024-09-23 | Stop reason: HOSPADM

## 2024-09-23 RX ADMIN — SODIUM CHLORIDE: 9 INJECTION, SOLUTION INTRAVENOUS at 13:25

## 2024-09-23 RX ADMIN — LIDOCAINE HYDROCHLORIDE 100 MG: 20 INJECTION, SOLUTION EPIDURAL; INFILTRATION; INTRACAUDAL; PERINEURAL at 13:37

## 2024-09-23 RX ADMIN — PROPOFOL 30 MG: 10 INJECTION, EMULSION INTRAVENOUS at 13:49

## 2024-09-23 RX ADMIN — Medication 10 MG: at 14:05

## 2024-09-23 RX ADMIN — PROPOFOL 10 MG: 10 INJECTION, EMULSION INTRAVENOUS at 14:13

## 2024-09-23 RX ADMIN — PROPOFOL 30 MG: 10 INJECTION, EMULSION INTRAVENOUS at 13:46

## 2024-09-23 RX ADMIN — PROPOFOL 20 MG: 10 INJECTION, EMULSION INTRAVENOUS at 14:09

## 2024-09-23 RX ADMIN — Medication 80 MCG: at 13:52

## 2024-09-23 RX ADMIN — PROPOFOL 10 MG: 10 INJECTION, EMULSION INTRAVENOUS at 13:56

## 2024-09-23 RX ADMIN — PROPOFOL 60 MG: 10 INJECTION, EMULSION INTRAVENOUS at 13:37

## 2024-09-23 RX ADMIN — PROPOFOL 20 MG: 10 INJECTION, EMULSION INTRAVENOUS at 14:02

## 2024-09-23 RX ADMIN — GLYCOPYRROLATE 0.1 MG: 0.2 INJECTION, SOLUTION INTRAMUSCULAR; INTRAVENOUS at 13:35

## 2024-09-23 RX ADMIN — PROPOFOL 20 MG: 10 INJECTION, EMULSION INTRAVENOUS at 13:53

## 2024-09-23 RX ADMIN — PROPOFOL 30 MG: 10 INJECTION, EMULSION INTRAVENOUS at 13:38

## 2024-09-23 RX ADMIN — Medication 80 MCG: at 13:58

## 2024-09-23 RX ADMIN — PROPOFOL 20 MG: 10 INJECTION, EMULSION INTRAVENOUS at 14:05

## 2024-09-23 RX ADMIN — PROPOFOL 30 MG: 10 INJECTION, EMULSION INTRAVENOUS at 13:40

## 2024-09-23 RX ADMIN — PROPOFOL 30 MG: 10 INJECTION, EMULSION INTRAVENOUS at 13:43

## 2024-09-23 RX ADMIN — GLYCOPYRROLATE 0.1 MG: 0.2 INJECTION, SOLUTION INTRAMUSCULAR; INTRAVENOUS at 13:59

## 2024-09-23 ASSESSMENT — PAIN - FUNCTIONAL ASSESSMENT: PAIN_FUNCTIONAL_ASSESSMENT: NONE - DENIES PAIN

## 2024-09-27 ENCOUNTER — TRANSCRIBE ORDERS (OUTPATIENT)
Facility: HOSPITAL | Age: 84
End: 2024-09-27

## 2024-09-27 DIAGNOSIS — C18.6 PRIMARY MALIGNANT NEOPLASM OF DESCENDING COLON (HCC): Primary | ICD-10-CM

## 2024-09-28 ENCOUNTER — HOSPITAL ENCOUNTER (OUTPATIENT)
Facility: HOSPITAL | Age: 84
Discharge: HOME | End: 2024-10-01
Attending: INTERNAL MEDICINE
Payer: MEDICARE

## 2024-09-28 DIAGNOSIS — C18.6 PRIMARY MALIGNANT NEOPLASM OF DESCENDING COLON (HCC): ICD-10-CM

## 2024-09-28 LAB — CREAT BLD-MCNC: 1.1 MG/DL (ref 0.6–1.3)

## 2024-09-28 PROCEDURE — 71260 CT THORAX DX C+: CPT

## 2024-09-28 PROCEDURE — 82565 ASSAY OF CREATININE: CPT

## 2024-09-28 PROCEDURE — 6360000004 HC RX CONTRAST MEDICATION: Performed by: INTERNAL MEDICINE

## 2024-09-28 PROCEDURE — 74177 CT ABD & PELVIS W/CONTRAST: CPT

## 2024-09-28 RX ORDER — IOPAMIDOL 755 MG/ML
100 INJECTION, SOLUTION INTRAVASCULAR
Status: COMPLETED | OUTPATIENT
Start: 2024-09-28 | End: 2024-09-28

## 2024-09-28 RX ADMIN — IOPAMIDOL 100 ML: 755 INJECTION, SOLUTION INTRAVENOUS at 11:59

## 2024-10-01 RX ORDER — FUROSEMIDE 20 MG
TABLET ORAL
Qty: 90 TABLET | Refills: 1 | Status: SHIPPED | OUTPATIENT
Start: 2024-10-01

## 2024-10-01 NOTE — TELEPHONE ENCOUNTER
VO per MD    Future Appointments   Date Time Provider Department Center   10/25/2024 10:00 AM Alberto Spence FNP [id number] DEP   12/12/2024  8:20 AM Hemant Keenan MD [facility] AMB

## 2024-10-07 ENCOUNTER — HOSPITAL ENCOUNTER (OUTPATIENT)
Facility: HOSPITAL | Age: 84
Discharge: HOME | End: 2024-10-10
Payer: MEDICARE

## 2024-10-07 VITALS
TEMPERATURE: 98.2 F | DIASTOLIC BLOOD PRESSURE: 69 MMHG | BODY MASS INDEX: 22.68 KG/M2 | SYSTOLIC BLOOD PRESSURE: 121 MMHG | HEART RATE: 92 BPM | WEIGHT: 158.4 LBS | HEIGHT: 70 IN

## 2024-10-07 LAB
ABO + RH BLD: NORMAL
ALBUMIN SERPL-MCNC: 2.5 G/DL (ref 3.5–5)
ALBUMIN/GLOB SERPL: 0.6 (ref 1.1–2.2)
ALP SERPL-CCNC: 169 U/L (ref 45–117)
ALT SERPL-CCNC: 41 U/L (ref 12–78)
ANION GAP SERPL CALC-SCNC: 3 MMOL/L (ref 2–12)
AST SERPL-CCNC: 42 U/L (ref 15–37)
BILIRUB SERPL-MCNC: 0.3 MG/DL (ref 0.2–1)
BLOOD GROUP ANTIBODIES SERPL: NORMAL
BUN SERPL-MCNC: 13 MG/DL (ref 6–20)
BUN/CREAT SERPL: 12 (ref 12–20)
CALCIUM SERPL-MCNC: 9.4 MG/DL (ref 8.5–10.1)
CHLORIDE SERPL-SCNC: 99 MMOL/L (ref 97–108)
CO2 SERPL-SCNC: 28 MMOL/L (ref 21–32)
CREAT SERPL-MCNC: 1.08 MG/DL (ref 0.7–1.3)
ERYTHROCYTE [DISTWIDTH] IN BLOOD BY AUTOMATED COUNT: 15.9 % (ref 11.5–14.5)
EST. AVERAGE GLUCOSE BLD GHB EST-MCNC: 105 MG/DL
GLOBULIN SER CALC-MCNC: 3.9 G/DL (ref 2–4)
GLUCOSE SERPL-MCNC: 126 MG/DL (ref 65–100)
HBA1C MFR BLD: 5.3 % (ref 4–5.6)
HCT VFR BLD AUTO: 29.9 % (ref 36.6–50.3)
HGB BLD-MCNC: 9.2 G/DL (ref 12.1–17)
MCH RBC QN AUTO: 23.5 PG (ref 26–34)
MCHC RBC AUTO-ENTMCNC: 30.8 G/DL (ref 30–36.5)
MCV RBC AUTO: 76.3 FL (ref 80–99)
NRBC # BLD: 0 K/UL (ref 0–0.01)
NRBC BLD-RTO: 0 PER 100 WBC
PLATELET # BLD AUTO: 170 K/UL (ref 150–400)
PMV BLD AUTO: 10 FL (ref 8.9–12.9)
POTASSIUM SERPL-SCNC: 4.3 MMOL/L (ref 3.5–5.1)
PROT SERPL-MCNC: 6.4 G/DL (ref 6.4–8.2)
RBC # BLD AUTO: 3.92 M/UL (ref 4.1–5.7)
SODIUM SERPL-SCNC: 130 MMOL/L (ref 136–145)
SPECIMEN EXP DATE BLD: NORMAL
WBC # BLD AUTO: 12 K/UL (ref 4.1–11.1)

## 2024-10-07 PROCEDURE — 83036 HEMOGLOBIN GLYCOSYLATED A1C: CPT

## 2024-10-07 PROCEDURE — 93005 ELECTROCARDIOGRAM TRACING: CPT | Performed by: COLON & RECTAL SURGERY

## 2024-10-07 PROCEDURE — 85027 COMPLETE CBC AUTOMATED: CPT

## 2024-10-07 PROCEDURE — 80053 COMPREHEN METABOLIC PANEL: CPT

## 2024-10-07 PROCEDURE — 86850 RBC ANTIBODY SCREEN: CPT

## 2024-10-07 PROCEDURE — 86900 BLOOD TYPING SEROLOGIC ABO: CPT

## 2024-10-07 PROCEDURE — 36415 COLL VENOUS BLD VENIPUNCTURE: CPT

## 2024-10-07 PROCEDURE — 86901 BLOOD TYPING SEROLOGIC RH(D): CPT

## 2024-10-07 ASSESSMENT — PAIN DESCRIPTION - PAIN TYPE: TYPE: CHRONIC PAIN

## 2024-10-07 ASSESSMENT — PAIN DESCRIPTION - ONSET: ONSET: ON-GOING

## 2024-10-07 ASSESSMENT — PAIN DESCRIPTION - DESCRIPTORS: DESCRIPTORS: DISCOMFORT

## 2024-10-07 ASSESSMENT — PAIN DESCRIPTION - ORIENTATION: ORIENTATION: RIGHT;LOWER

## 2024-10-07 ASSESSMENT — PAIN SCALES - GENERAL: PAINLEVEL_OUTOF10: 4

## 2024-10-07 ASSESSMENT — PAIN DESCRIPTION - LOCATION: LOCATION: ABDOMEN

## 2024-10-07 ASSESSMENT — PAIN - FUNCTIONAL ASSESSMENT: PAIN_FUNCTIONAL_ASSESSMENT: ACTIVITIES ARE NOT PREVENTED

## 2024-10-07 NOTE — PERIOP NOTE
[address] [address]   MAIN OR                                  [phone]    MAIN PRE OP             [phone]                                                                                AMBULATORY PRE OP          [phone]  PRE-ADMISSION TESTING    [phone]     Surgery Date:  10/14/24       Is surgery arrival time given by surgeon?NO    If “NO”, [facility] staff will call you between 4 and 7pm the day before your surgery with your arrival time. (If your surgery is on a Monday, we will call you the Friday before.)    Call [phone] after 7pm Monday-Friday if you did not receive this call.    INSTRUCTIONS BEFORE YOUR SURGERY   When You  Arrive Arrive at [facility] Patient Access on 1st floor the day of your surgery.  Have your insurance card, photo ID,living will/advanced directive/POA (if applicable),  and any copayment (if needed)   Food   and   Drink NO solid food after midnight the night before surgery. You can drink clear liquids from midnight until ONE hour prior to your arrival at the hospital on the day of your surgery. Clear liquids include:  Water  Fruit juices without pulp  Carbonated beverages  Black coffee(no cream/milk)  Tea(no cream/milk)  Gatorade    No alcohol (beer, wine, liquor) or marijuana (smoking) 24 hours, edibles (3 days). Stop smoking cigarettes 14 days before surgery (helps w/healing and breathing).   Medications to   TAKE   Morning of Surgery MEDICATIONS TO TAKE THE MORNING OF SURGERY WITH A SIP OF WATER:   NONE  Ask your surgeon/prescribing doctor for instructions on taking or stopping these medications prior to surgery: NONE   Medications to STOP  before surgery Non-Steroidal anti-inflammatory Drugs (NSAID's): for example, Ibuprofen (Advil, Motrin), Naproxen (Aleve) 7 days  STOP all herbal supplements and vitamins(unless prescribed by your doctor), and fish oil for 7 days  Other:  (Pain medications

## 2024-10-07 NOTE — PERIOP NOTE
CARDIOLOGY NOTES FROM DR. DOTY ARE NOTED IN CC.    CARDIOLOGY NOTES FROM BOGDAN MEDELLIN NP ARE NOTED IN CC.    PT HAS A WATCHMAN DEVICE.    PT INSTRUCTED ON ERAS NUTRITIONAL PLAN.  PT AND DAUGHTER VERBALIZED UNDERSTANDING OF INSTRUCTIONS.    PT'S DAUGHTER CALLED TO DR. GRANT'S OFFICE DURING PAT APPT TO SEE IF PT WOULD BE DOING A BOWEL PREP PRIOR TO SURGERY.  PT'S DAUGHTER REC'D BOWEL PREP INSTRUCTIONS FROM SHIRA URBANO OVER AT DR. GRANT'S OFFICE AND VERBALIZED UNDERSTANDING AND WILL BE PICKING THE PREP UP ON THE WAY HOME TODAY.    [facility] Nutrition Screen    1. Has the patient had an unplanned weight loss of 10% of body weight or more in the last 6 months? No = 0   2. Has the patient been eating less than 50% of their normal diet in the preceding week? No = 0       Patient instructed on Non-Diabetic pre-operative nutrition plan to start 6 days prior to surgery. Patient given 10 shakes and 3 pre-surgery drinks. Opportunity given for questions and all questions answered.     STOMA/WOUND RN NOTIFIED OF UPCOMING SURGERY. ML ON VM.      PT INSTRUCTED ON INCENTIVE SPIROMETER USE AND DEMONSTRATED PROPER USE. PT VERBALIZED UNDERSTANDING.

## 2024-10-08 LAB
EKG ATRIAL RATE: 81 BPM
EKG DIAGNOSIS: NORMAL
EKG P AXIS: 60 DEGREES
EKG P-R INTERVAL: 202 MS
EKG Q-T INTERVAL: 382 MS
EKG QRS DURATION: 130 MS
EKG QTC CALCULATION (BAZETT): 443 MS
EKG R AXIS: 35 DEGREES
EKG T AXIS: 11 DEGREES
EKG VENTRICULAR RATE: 81 BPM

## 2024-10-08 PROCEDURE — 93010 ELECTROCARDIOGRAM REPORT: CPT | Performed by: INTERNAL MEDICINE

## 2024-10-08 NOTE — PERIOP NOTE
CALLED DR GRANT'S OFFICE AND SPOKE WITH HIS MEDICAL ASSISTANT. NOTIFIED OF ABNORMAL LABS:    SODIUM 130  ALK PHOS 169  HGB 9.2    SHE STATED SHE WOULD NOTIFY DR GRANT. ALL LABS ALSO FAXED TO SURGEON'S OFFICE WITH CONFIRMATION RECEIVED AND MEDICAL ASSISTANT ACKNOWLEDGED RECEIPT OF LABS.

## 2024-10-08 NOTE — WOUND CARE
Notified by PAT of colectomy on 10/14.  Chart reviewed with no indication of a diversion.   Available as needed per Dr. Bateman.   RUBY PerezN

## 2024-10-14 ENCOUNTER — HOSPITAL ENCOUNTER (INPATIENT)
Facility: HOSPITAL | Age: 84
LOS: 10 days | Discharge: HOME | DRG: 329 | End: 2024-10-24
Attending: COLON & RECTAL SURGERY | Admitting: COLON & RECTAL SURGERY
Payer: MEDICARE

## 2024-10-14 ENCOUNTER — ANESTHESIA (OUTPATIENT)
Facility: HOSPITAL | Age: 84
End: 2024-10-14
Payer: MEDICARE

## 2024-10-14 ENCOUNTER — ANESTHESIA EVENT (OUTPATIENT)
Facility: HOSPITAL | Age: 84
End: 2024-10-14
Payer: MEDICARE

## 2024-10-14 PROBLEM — C18.0 CECAL CANCER (HCC): Status: ACTIVE | Noted: 2024-10-14

## 2024-10-14 PROCEDURE — 87205 SMEAR GRAM STAIN: CPT

## 2024-10-14 PROCEDURE — 3700000000 HC ANESTHESIA ATTENDED CARE: Performed by: COLON & RECTAL SURGERY

## 2024-10-14 PROCEDURE — 6360000002 HC RX W HCPCS

## 2024-10-14 PROCEDURE — 3600000009 HC SURGERY ROBOT BASE: Performed by: COLON & RECTAL SURGERY

## 2024-10-14 PROCEDURE — 2709999900 HC NON-CHARGEABLE SUPPLY: Performed by: COLON & RECTAL SURGERY

## 2024-10-14 PROCEDURE — 7100000000 HC PACU RECOVERY - FIRST 15 MIN: Performed by: COLON & RECTAL SURGERY

## 2024-10-14 PROCEDURE — 87075 CULTR BACTERIA EXCEPT BLOOD: CPT

## 2024-10-14 PROCEDURE — S2900 ROBOTIC SURGICAL SYSTEM: HCPCS | Performed by: COLON & RECTAL SURGERY

## 2024-10-14 PROCEDURE — 1200000000 HC SEMI PRIVATE

## 2024-10-14 PROCEDURE — 88360 TUMOR IMMUNOHISTOCHEM/MANUAL: CPT

## 2024-10-14 PROCEDURE — 6360000002 HC RX W HCPCS: Performed by: COLON & RECTAL SURGERY

## 2024-10-14 PROCEDURE — 87077 CULTURE AEROBIC IDENTIFY: CPT

## 2024-10-14 PROCEDURE — 2580000003 HC RX 258: Performed by: COLON & RECTAL SURGERY

## 2024-10-14 PROCEDURE — 6360000002 HC RX W HCPCS: Performed by: ANESTHESIOLOGY

## 2024-10-14 PROCEDURE — 87147 CULTURE TYPE IMMUNOLOGIC: CPT

## 2024-10-14 PROCEDURE — 3700000001 HC ADD 15 MINUTES (ANESTHESIA): Performed by: COLON & RECTAL SURGERY

## 2024-10-14 PROCEDURE — 2500000003 HC RX 250 WO HCPCS: Performed by: COLON & RECTAL SURGERY

## 2024-10-14 PROCEDURE — 2580000003 HC RX 258: Performed by: ANESTHESIOLOGY

## 2024-10-14 PROCEDURE — 2720000010 HC SURG SUPPLY STERILE: Performed by: COLON & RECTAL SURGERY

## 2024-10-14 PROCEDURE — 3600000019 HC SURGERY ROBOT ADDTL 15MIN: Performed by: COLON & RECTAL SURGERY

## 2024-10-14 PROCEDURE — 2500000003 HC RX 250 WO HCPCS

## 2024-10-14 PROCEDURE — P9045 ALBUMIN (HUMAN), 5%, 250 ML: HCPCS

## 2024-10-14 PROCEDURE — 87070 CULTURE OTHR SPECIMN AEROBIC: CPT

## 2024-10-14 PROCEDURE — 87186 SC STD MICRODIL/AGAR DIL: CPT

## 2024-10-14 PROCEDURE — 7100000001 HC PACU RECOVERY - ADDTL 15 MIN: Performed by: COLON & RECTAL SURGERY

## 2024-10-14 PROCEDURE — 88309 TISSUE EXAM BY PATHOLOGIST: CPT

## 2024-10-14 PROCEDURE — C1765 ADHESION BARRIER: HCPCS | Performed by: COLON & RECTAL SURGERY

## 2024-10-14 PROCEDURE — 2580000003 HC RX 258

## 2024-10-14 PROCEDURE — 87076 CULTURE ANAEROBE IDENT EACH: CPT

## 2024-10-14 PROCEDURE — 0DTF0ZZ RESECTION OF RIGHT LARGE INTESTINE, OPEN APPROACH: ICD-10-PCS | Performed by: COLON & RECTAL SURGERY

## 2024-10-14 PROCEDURE — 6370000000 HC RX 637 (ALT 250 FOR IP): Performed by: COLON & RECTAL SURGERY

## 2024-10-14 RX ORDER — ONDANSETRON 2 MG/ML
INJECTION INTRAMUSCULAR; INTRAVENOUS
Status: DISCONTINUED | OUTPATIENT
Start: 2024-10-14 | End: 2024-10-14 | Stop reason: SDUPTHER

## 2024-10-14 RX ORDER — ONDANSETRON 2 MG/ML
4 INJECTION INTRAMUSCULAR; INTRAVENOUS
Status: DISCONTINUED | OUTPATIENT
Start: 2024-10-14 | End: 2024-10-14 | Stop reason: HOSPADM

## 2024-10-14 RX ORDER — OXYCODONE HYDROCHLORIDE 5 MG/1
5 TABLET ORAL EVERY 4 HOURS PRN
Status: DISCONTINUED | OUTPATIENT
Start: 2024-10-14 | End: 2024-10-24 | Stop reason: HOSPADM

## 2024-10-14 RX ORDER — FENTANYL CITRATE 50 UG/ML
50 INJECTION, SOLUTION INTRAMUSCULAR; INTRAVENOUS PRN
Status: DISCONTINUED | OUTPATIENT
Start: 2024-10-14 | End: 2024-10-14 | Stop reason: HOSPADM

## 2024-10-14 RX ORDER — ROCURONIUM BROMIDE 10 MG/ML
INJECTION, SOLUTION INTRAVENOUS
Status: DISCONTINUED | OUTPATIENT
Start: 2024-10-14 | End: 2024-10-14 | Stop reason: SDUPTHER

## 2024-10-14 RX ORDER — ACETAMINOPHEN 325 MG/1
650 TABLET ORAL ONCE
Status: DISCONTINUED | OUTPATIENT
Start: 2024-10-14 | End: 2024-10-14 | Stop reason: SDUPTHER

## 2024-10-14 RX ORDER — OXYCODONE HYDROCHLORIDE 5 MG/1
5 TABLET ORAL
Status: DISCONTINUED | OUTPATIENT
Start: 2024-10-14 | End: 2024-10-14 | Stop reason: HOSPADM

## 2024-10-14 RX ORDER — DEXAMETHASONE SODIUM PHOSPHATE 4 MG/ML
INJECTION, SOLUTION INTRA-ARTICULAR; INTRALESIONAL; INTRAMUSCULAR; INTRAVENOUS; SOFT TISSUE
Status: DISCONTINUED | OUTPATIENT
Start: 2024-10-14 | End: 2024-10-14 | Stop reason: SDUPTHER

## 2024-10-14 RX ORDER — SODIUM CHLORIDE 0.9 % (FLUSH) 0.9 %
5-40 SYRINGE (ML) INJECTION EVERY 12 HOURS SCHEDULED
Status: DISCONTINUED | OUTPATIENT
Start: 2024-10-14 | End: 2024-10-14 | Stop reason: HOSPADM

## 2024-10-14 RX ORDER — MIDAZOLAM HYDROCHLORIDE 2 MG/2ML
2 INJECTION, SOLUTION INTRAMUSCULAR; INTRAVENOUS PRN
Status: DISCONTINUED | OUTPATIENT
Start: 2024-10-14 | End: 2024-10-14 | Stop reason: HOSPADM

## 2024-10-14 RX ORDER — SODIUM CHLORIDE, SODIUM LACTATE, POTASSIUM CHLORIDE, CALCIUM CHLORIDE 600; 310; 30; 20 MG/100ML; MG/100ML; MG/100ML; MG/100ML
INJECTION, SOLUTION INTRAVENOUS CONTINUOUS
Status: DISCONTINUED | OUTPATIENT
Start: 2024-10-14 | End: 2024-10-14 | Stop reason: HOSPADM

## 2024-10-14 RX ORDER — PROCHLORPERAZINE EDISYLATE 5 MG/ML
5 INJECTION INTRAMUSCULAR; INTRAVENOUS
Status: DISCONTINUED | OUTPATIENT
Start: 2024-10-14 | End: 2024-10-14 | Stop reason: HOSPADM

## 2024-10-14 RX ORDER — DIPHENHYDRAMINE HYDROCHLORIDE 50 MG/ML
12.5 INJECTION INTRAMUSCULAR; INTRAVENOUS
Status: DISCONTINUED | OUTPATIENT
Start: 2024-10-14 | End: 2024-10-14 | Stop reason: HOSPADM

## 2024-10-14 RX ORDER — SODIUM CHLORIDE 9 MG/ML
INJECTION, SOLUTION INTRAVENOUS CONTINUOUS
Status: DISCONTINUED | OUTPATIENT
Start: 2024-10-14 | End: 2024-10-14 | Stop reason: HOSPADM

## 2024-10-14 RX ORDER — PROPOFOL 10 MG/ML
INJECTION, EMULSION INTRAVENOUS
Status: DISCONTINUED | OUTPATIENT
Start: 2024-10-14 | End: 2024-10-14 | Stop reason: SDUPTHER

## 2024-10-14 RX ORDER — ONDANSETRON 4 MG/1
4 TABLET, ORALLY DISINTEGRATING ORAL EVERY 8 HOURS PRN
Status: DISCONTINUED | OUTPATIENT
Start: 2024-10-14 | End: 2024-10-24 | Stop reason: HOSPADM

## 2024-10-14 RX ORDER — SODIUM CHLORIDE 9 MG/ML
INJECTION, SOLUTION INTRAVENOUS PRN
Status: DISCONTINUED | OUTPATIENT
Start: 2024-10-14 | End: 2024-10-14 | Stop reason: HOSPADM

## 2024-10-14 RX ORDER — SODIUM CHLORIDE 0.9 % (FLUSH) 0.9 %
5-40 SYRINGE (ML) INJECTION PRN
Status: DISCONTINUED | OUTPATIENT
Start: 2024-10-14 | End: 2024-10-14 | Stop reason: HOSPADM

## 2024-10-14 RX ORDER — FENTANYL CITRATE 50 UG/ML
INJECTION, SOLUTION INTRAMUSCULAR; INTRAVENOUS
Status: DISCONTINUED | OUTPATIENT
Start: 2024-10-14 | End: 2024-10-14 | Stop reason: SDUPTHER

## 2024-10-14 RX ORDER — ALVIMOPAN 12 MG/1
12 CAPSULE ORAL ONCE
Status: COMPLETED | OUTPATIENT
Start: 2024-10-14 | End: 2024-10-14

## 2024-10-14 RX ORDER — LIDOCAINE HYDROCHLORIDE 10 MG/ML
1 INJECTION, SOLUTION EPIDURAL; INFILTRATION; INTRACAUDAL; PERINEURAL
Status: DISCONTINUED | OUTPATIENT
Start: 2024-10-14 | End: 2024-10-14 | Stop reason: HOSPADM

## 2024-10-14 RX ORDER — LIDOCAINE HYDROCHLORIDE 20 MG/ML
INJECTION, SOLUTION EPIDURAL; INFILTRATION; INTRACAUDAL; PERINEURAL
Status: DISCONTINUED | OUTPATIENT
Start: 2024-10-14 | End: 2024-10-14 | Stop reason: SDUPTHER

## 2024-10-14 RX ORDER — MIDAZOLAM HYDROCHLORIDE 2 MG/2ML
2 INJECTION, SOLUTION INTRAMUSCULAR; INTRAVENOUS
Status: DISCONTINUED | OUTPATIENT
Start: 2024-10-14 | End: 2024-10-14 | Stop reason: HOSPADM

## 2024-10-14 RX ORDER — SODIUM CHLORIDE 9 MG/ML
INJECTION, SOLUTION INTRAVENOUS PRN
Status: DISCONTINUED | OUTPATIENT
Start: 2024-10-14 | End: 2024-10-24 | Stop reason: HOSPADM

## 2024-10-14 RX ORDER — LEVOFLOXACIN 5 MG/ML
750 INJECTION, SOLUTION INTRAVENOUS EVERY 24 HOURS
Status: DISCONTINUED | OUTPATIENT
Start: 2024-10-14 | End: 2024-10-17 | Stop reason: RX

## 2024-10-14 RX ORDER — CELECOXIB 200 MG/1
200 CAPSULE ORAL ONCE
Status: COMPLETED | OUTPATIENT
Start: 2024-10-14 | End: 2024-10-14

## 2024-10-14 RX ORDER — NALOXONE HYDROCHLORIDE 0.4 MG/ML
INJECTION, SOLUTION INTRAMUSCULAR; INTRAVENOUS; SUBCUTANEOUS PRN
Status: DISCONTINUED | OUTPATIENT
Start: 2024-10-14 | End: 2024-10-14 | Stop reason: HOSPADM

## 2024-10-14 RX ORDER — PHENYLEPHRINE HCL IN 0.9% NACL 0.4MG/10ML
SYRINGE (ML) INTRAVENOUS
Status: DISCONTINUED | OUTPATIENT
Start: 2024-10-14 | End: 2024-10-14 | Stop reason: SDUPTHER

## 2024-10-14 RX ORDER — SODIUM CHLORIDE 0.9 % (FLUSH) 0.9 %
5-40 SYRINGE (ML) INJECTION PRN
Status: DISCONTINUED | OUTPATIENT
Start: 2024-10-14 | End: 2024-10-24 | Stop reason: HOSPADM

## 2024-10-14 RX ORDER — LIDOCAINE HYDROCHLORIDE ANHYDROUS AND DEXTROSE MONOHYDRATE 5; 400 G/100ML; MG/100ML
INJECTION, SOLUTION INTRAVENOUS
Status: DISCONTINUED | OUTPATIENT
Start: 2024-10-14 | End: 2024-10-14 | Stop reason: SDUPTHER

## 2024-10-14 RX ORDER — SUCCINYLCHOLINE/SOD CL,ISO/PF 200MG/10ML
SYRINGE (ML) INTRAVENOUS
Status: DISCONTINUED | OUTPATIENT
Start: 2024-10-14 | End: 2024-10-14 | Stop reason: SDUPTHER

## 2024-10-14 RX ORDER — SODIUM CHLORIDE 0.9 % (FLUSH) 0.9 %
5-40 SYRINGE (ML) INJECTION EVERY 12 HOURS SCHEDULED
Status: DISCONTINUED | OUTPATIENT
Start: 2024-10-14 | End: 2024-10-24 | Stop reason: HOSPADM

## 2024-10-14 RX ORDER — KETOROLAC TROMETHAMINE 30 MG/ML
15 INJECTION, SOLUTION INTRAMUSCULAR; INTRAVENOUS EVERY 6 HOURS SCHEDULED
Status: COMPLETED | OUTPATIENT
Start: 2024-10-14 | End: 2024-10-16

## 2024-10-14 RX ORDER — FENTANYL CITRATE 50 UG/ML
25 INJECTION, SOLUTION INTRAMUSCULAR; INTRAVENOUS EVERY 5 MIN PRN
Status: DISCONTINUED | OUTPATIENT
Start: 2024-10-14 | End: 2024-10-14 | Stop reason: HOSPADM

## 2024-10-14 RX ORDER — ENOXAPARIN SODIUM 100 MG/ML
40 INJECTION SUBCUTANEOUS DAILY
Status: DISCONTINUED | OUTPATIENT
Start: 2024-10-15 | End: 2024-10-24 | Stop reason: HOSPADM

## 2024-10-14 RX ORDER — LIDOCAINE HYDROCHLORIDE ANHYDROUS AND DEXTROSE MONOHYDRATE 5; 400 G/100ML; MG/100ML
1 INJECTION, SOLUTION INTRAVENOUS CONTINUOUS
Status: DISCONTINUED | OUTPATIENT
Start: 2024-10-14 | End: 2024-10-15

## 2024-10-14 RX ORDER — ALBUMIN, HUMAN INJ 5% 5 %
SOLUTION INTRAVENOUS
Status: DISCONTINUED | OUTPATIENT
Start: 2024-10-14 | End: 2024-10-14 | Stop reason: SDUPTHER

## 2024-10-14 RX ORDER — ONDANSETRON 2 MG/ML
4 INJECTION INTRAMUSCULAR; INTRAVENOUS EVERY 6 HOURS PRN
Status: DISCONTINUED | OUTPATIENT
Start: 2024-10-14 | End: 2024-10-24 | Stop reason: HOSPADM

## 2024-10-14 RX ORDER — HYDROMORPHONE HYDROCHLORIDE 1 MG/ML
0.5 INJECTION, SOLUTION INTRAMUSCULAR; INTRAVENOUS; SUBCUTANEOUS EVERY 5 MIN PRN
Status: DISCONTINUED | OUTPATIENT
Start: 2024-10-14 | End: 2024-10-14 | Stop reason: HOSPADM

## 2024-10-14 RX ORDER — LABETALOL HYDROCHLORIDE 5 MG/ML
10 INJECTION, SOLUTION INTRAVENOUS
Status: DISCONTINUED | OUTPATIENT
Start: 2024-10-14 | End: 2024-10-14 | Stop reason: HOSPADM

## 2024-10-14 RX ORDER — FENTANYL CITRATE 50 UG/ML
25 INJECTION, SOLUTION INTRAMUSCULAR; INTRAVENOUS PRN
Status: DISCONTINUED | OUTPATIENT
Start: 2024-10-14 | End: 2024-10-14 | Stop reason: HOSPADM

## 2024-10-14 RX ORDER — METRONIDAZOLE 500 MG/100ML
500 INJECTION, SOLUTION INTRAVENOUS EVERY 8 HOURS
Status: DISCONTINUED | OUTPATIENT
Start: 2024-10-14 | End: 2024-10-23

## 2024-10-14 RX ORDER — MAGNESIUM SULFATE HEPTAHYDRATE 40 MG/ML
INJECTION, SOLUTION INTRAVENOUS
Status: DISCONTINUED | OUTPATIENT
Start: 2024-10-14 | End: 2024-10-14 | Stop reason: SDUPTHER

## 2024-10-14 RX ORDER — GLYCOPYRROLATE 0.2 MG/ML
INJECTION INTRAMUSCULAR; INTRAVENOUS
Status: DISCONTINUED | OUTPATIENT
Start: 2024-10-14 | End: 2024-10-14 | Stop reason: SDUPTHER

## 2024-10-14 RX ORDER — BUPIVACAINE HYDROCHLORIDE AND EPINEPHRINE 5; 5 MG/ML; UG/ML
INJECTION, SOLUTION PERINEURAL PRN
Status: DISCONTINUED | OUTPATIENT
Start: 2024-10-14 | End: 2024-10-14 | Stop reason: HOSPADM

## 2024-10-14 RX ORDER — ACETAMINOPHEN 500 MG
1000 TABLET ORAL ONCE
Status: COMPLETED | OUTPATIENT
Start: 2024-10-14 | End: 2024-10-14

## 2024-10-14 RX ORDER — METRONIDAZOLE 500 MG/100ML
500 INJECTION, SOLUTION INTRAVENOUS ONCE
Status: COMPLETED | OUTPATIENT
Start: 2024-10-14 | End: 2024-10-14

## 2024-10-14 RX ORDER — SODIUM CHLORIDE, SODIUM LACTATE, POTASSIUM CHLORIDE, CALCIUM CHLORIDE 600; 310; 30; 20 MG/100ML; MG/100ML; MG/100ML; MG/100ML
INJECTION, SOLUTION INTRAVENOUS CONTINUOUS
Status: DISCONTINUED | OUTPATIENT
Start: 2024-10-14 | End: 2024-10-15

## 2024-10-14 RX ADMIN — Medication 120 MCG: at 10:43

## 2024-10-14 RX ADMIN — LIDOCAINE HYDROCHLORIDE 50 MG: 20 INJECTION, SOLUTION EPIDURAL; INFILTRATION; INTRACAUDAL; PERINEURAL at 10:14

## 2024-10-14 RX ADMIN — LIDOCAINE HYDROCHLORIDE 1 MG/KG/HR: 4 INJECTION, SOLUTION INTRAVENOUS at 10:14

## 2024-10-14 RX ADMIN — ROCURONIUM BROMIDE 10 MG: 10 INJECTION, SOLUTION INTRAVENOUS at 10:14

## 2024-10-14 RX ADMIN — ROCURONIUM BROMIDE 30 MG: 10 INJECTION, SOLUTION INTRAVENOUS at 10:41

## 2024-10-14 RX ADMIN — DEXAMETHASONE SODIUM PHOSPHATE 4 MG: 4 INJECTION INTRA-ARTICULAR; INTRALESIONAL; INTRAMUSCULAR; INTRAVENOUS; SOFT TISSUE at 10:52

## 2024-10-14 RX ADMIN — FENTANYL CITRATE 50 MCG: 50 INJECTION, SOLUTION INTRAMUSCULAR; INTRAVENOUS at 12:40

## 2024-10-14 RX ADMIN — Medication 25 MG: at 10:34

## 2024-10-14 RX ADMIN — KETOROLAC TROMETHAMINE 15 MG: 30 INJECTION, SOLUTION INTRAMUSCULAR at 23:43

## 2024-10-14 RX ADMIN — SODIUM CHLORIDE, POTASSIUM CHLORIDE, SODIUM LACTATE AND CALCIUM CHLORIDE: 600; 310; 30; 20 INJECTION, SOLUTION INTRAVENOUS at 09:10

## 2024-10-14 RX ADMIN — FENTANYL CITRATE 50 MCG: 50 INJECTION, SOLUTION INTRAMUSCULAR; INTRAVENOUS at 10:14

## 2024-10-14 RX ADMIN — WATER 2000 MG: 1 INJECTION INTRAMUSCULAR; INTRAVENOUS; SUBCUTANEOUS at 10:28

## 2024-10-14 RX ADMIN — ONDANSETRON 4 MG: 2 INJECTION INTRAMUSCULAR; INTRAVENOUS at 10:52

## 2024-10-14 RX ADMIN — ALBUMIN (HUMAN) 12.5 G: 12.5 INJECTION, SOLUTION INTRAVENOUS at 10:42

## 2024-10-14 RX ADMIN — ACETAMINOPHEN 1000 MG: 500 TABLET ORAL at 08:56

## 2024-10-14 RX ADMIN — CELECOXIB 200 MG: 200 CAPSULE ORAL at 08:56

## 2024-10-14 RX ADMIN — GLYCOPYRROLATE 0.2 MG: 0.2 INJECTION INTRAMUSCULAR; INTRAVENOUS at 10:51

## 2024-10-14 RX ADMIN — MAGNESIUM SULFATE HEPTAHYDRATE 2000 MG: 40 INJECTION, SOLUTION INTRAVENOUS at 10:45

## 2024-10-14 RX ADMIN — Medication 100 MG: at 10:14

## 2024-10-14 RX ADMIN — KETOROLAC TROMETHAMINE 15 MG: 30 INJECTION, SOLUTION INTRAMUSCULAR at 18:36

## 2024-10-14 RX ADMIN — ALVIMOPAN 12 MG: 12 CAPSULE ORAL at 08:56

## 2024-10-14 RX ADMIN — PROPOFOL 150 MG: 10 INJECTION, EMULSION INTRAVENOUS at 10:14

## 2024-10-14 RX ADMIN — Medication 25 MG: at 10:14

## 2024-10-14 RX ADMIN — LEVOFLOXACIN 750 MG: 750 INJECTION, SOLUTION INTRAVENOUS at 18:49

## 2024-10-14 RX ADMIN — PHENYLEPHRINE HYDROCHLORIDE 40 MCG/MIN: 10 INJECTION INTRAVENOUS at 10:24

## 2024-10-14 RX ADMIN — SUGAMMADEX 150 MG: 100 INJECTION, SOLUTION INTRAVENOUS at 12:33

## 2024-10-14 RX ADMIN — ROCURONIUM BROMIDE 10 MG: 10 INJECTION, SOLUTION INTRAVENOUS at 11:09

## 2024-10-14 RX ADMIN — FENTANYL CITRATE 25 MCG: 50 INJECTION INTRAMUSCULAR; INTRAVENOUS at 14:24

## 2024-10-14 RX ADMIN — LIDOCAINE HYDROCHLORIDE 1 MG/KG/HR: 4 INJECTION, SOLUTION INTRAVENOUS at 14:06

## 2024-10-14 RX ADMIN — ROCURONIUM BROMIDE 10 MG: 10 INJECTION, SOLUTION INTRAVENOUS at 11:36

## 2024-10-14 RX ADMIN — METRONIDAZOLE 500 MG: 5 INJECTION, SOLUTION INTRAVENOUS at 10:28

## 2024-10-14 RX ADMIN — METRONIDAZOLE 500 MG: 500 INJECTION, SOLUTION INTRAVENOUS at 18:44

## 2024-10-14 ASSESSMENT — PAIN DESCRIPTION - LOCATION
LOCATION: ABDOMEN

## 2024-10-14 ASSESSMENT — PAIN DESCRIPTION - DESCRIPTORS
DESCRIPTORS: ACHING
DESCRIPTORS: ACHING

## 2024-10-14 ASSESSMENT — PAIN DESCRIPTION - ORIENTATION
ORIENTATION: LEFT
ORIENTATION: ANTERIOR

## 2024-10-14 ASSESSMENT — PAIN SCALES - GENERAL
PAINLEVEL_OUTOF10: 4
PAINLEVEL_OUTOF10: 3
PAINLEVEL_OUTOF10: 3

## 2024-10-14 ASSESSMENT — PAIN - FUNCTIONAL ASSESSMENT
PAIN_FUNCTIONAL_ASSESSMENT: 0-10
PAIN_FUNCTIONAL_ASSESSMENT: ACTIVITIES ARE NOT PREVENTED

## 2024-10-14 ASSESSMENT — PAIN DESCRIPTION - PAIN TYPE: TYPE: SURGICAL PAIN

## 2024-10-14 ASSESSMENT — PAIN DESCRIPTION - FREQUENCY: FREQUENCY: INTERMITTENT

## 2024-10-14 ASSESSMENT — PAIN DESCRIPTION - ONSET: ONSET: ON-GOING

## 2024-10-14 NOTE — ANESTHESIA PRE PROCEDURE
Department of Anesthesiology  Preprocedure Note       Name:  Toni Crook   Age:  84 y.o.  :  1940                                          MRN:  [id number]         Date:  10/14/2024      Surgeon: Surgeon(s):  Clay Bateman MD    Procedure: Procedure(s):  ROBOTIC ASSISTED RIGHT COLECTOMY (E R A S)    Medications prior to admission:   Prior to Admission medications    Medication Sig Start Date End Date Taking? Authorizing Provider   furosemide (LASIX) 20 MG tablet TAKE 1 TABLET BY MOUTH DAILY  Patient taking differently: Take 1 tablet by mouth daily 10/1/24  Yes Hemant Keenan MD   lisinopril (PRINIVIL;ZESTRIL) 20 MG tablet TAKE 1 TABLET BY MOUTH DAILY 24  Yes Hemant Keenan MD   aspirin 81 MG EC tablet Take 1 tablet by mouth daily  Patient taking differently: Take 1 tablet by mouth every other day 23  Yes Hemant Keenan MD   iron polysaccharides (NIFEREX) 150 MG capsule Take 1 capsule by mouth Daily with lunch 21  Yes Automatic Reconciliation, Ar       Current medications:    Current Facility-Administered Medications   Medication Dose Route Frequency Provider Last Rate Last Admin   • ceFAZolin (ANCEF) 2,000 mg in sterile water 20 mL IV syringe  2,000 mg IntraVENous Once Clay Bateman MD       • metroNIDAZOLE (FLAGYL) 500 mg in 0.9% NaCl 100 mL IVPB premix  500 mg IntraVENous Once Clay Bateman MD       • lidocaine PF 1 % injection 1 mL  1 mL IntraDERmal Once PRN Hemant Younger MD       • fentaNYL (SUBLIMAZE) injection 25 mcg  25 mcg IntraVENous PRN Hemant Younger MD        Or   • fentaNYL (SUBLIMAZE) injection 50 mcg  50 mcg IntraVENous PRN Hemant Younger MD       • 0.9 % sodium chloride infusion   IntraVENous Continuous Hemant Younger MD       • lactated ringers IV soln infusion   IntraVENous Continuous Hemant Younger MD       • sodium chloride flush 0.9 % injection 5-40 mL  5-40 mL IntraVENous 2 times per day Hemant Younger MD       • sodium chloride flush 0.9

## 2024-10-14 NOTE — H&P
Colon and Rectal Surgery History and Physical    Subjective:      Toni Crook is a 84 y.o. male who has a cecal mass and liver mass    Patient Active Problem List    Diagnosis Date Noted    Presence of Watchman left atrial appendage closure device 11/21/2023    Secondary hypercoagulable state (HCC) 11/06/2023    Stage 3 chronic kidney disease, unspecified whether stage 3a or 3b CKD (HCC) 02/01/2023    Iron deficiency anemia 02/01/2023    Typical atrial flutter (HCC) 01/08/2020    Status post catheter ablation of atrial flutter 01/08/2020    Atrial fibrillation (HCC) 01/08/2020    S/P ablation of atrial fibrillation 05/23/2018    Persistent atrial fibrillation (HCC) 05/23/2018    Advanced care planning/counseling discussion 04/18/2017    Thrombocytopenia (HCC) 03/08/2016    Advance care planning 03/08/2016    Acne rosacea 06/01/2010    Hypercholesteremia 06/01/2010    HTN (hypertension) 06/01/2010     Past Medical History:   Diagnosis Date    Acne rosacea 6/1/2010    Atrial fibrillation (HCC)     Cancer (HCC) 10/2024    COLON    HTN (hypertension) 6/1/2010    Hypercholesteremia 6/1/2010      Past Surgical History:   Procedure Laterality Date    ABLATE L/R ATRIAL FIBRIL W/ISOLATED PULM VEIN N/A 1/8/2020    Ablation Following A-Fib  Addl performed by Hemant Keenan MD at [facility] CARDIAC CATH LAB    COLONOSCOPY N/A 09/23/2024    COLONOSCOPY performed by Sonia Welch MD at [facility] ENDOSCOPY    EP DEVICE PROCEDURE N/A 11/21/2023    Watchman casi closure device performed by Hemant Keenan MD at [facility] CARDIAC CATH LAB    EPHYS EVL TRNSPTL TX ATRIAL FIB ISOLAT PULM VEIN N/A 1/8/2020    ABLATION A-FIB  W COMPLETE EP STUDY performed by Hemant Keenan MD at [facility] CARDIAC CATH LAB    EYE SURGERY Bilateral     CATARACTS    INTRACARD ECHO, THER/DX INTERVENT N/A 1/8/2020    Intracardiac Echocardiogram performed by Hemant Keenan MD at [facility] CARDIAC CATH LAB    INTRACARDIAC ELECTROPHYSIOLOGIC 3D MAPPING N/A 1/8/2020    Ep 3d Mapping  performed by Hemant Keenan MD at [facility] CARDIAC CATH LAB    UPPER GASTROINTESTINAL ENDOSCOPY N/A 09/23/2024    ESOPHAGOGASTRODUODENOSCOPY performed by Sonia Welhc MD at [facility] ENDOSCOPY    WISDOM TOOTH EXTRACTION        Social History     Tobacco Use    Smoking status: Former     Passive exposure: Never    Smokeless tobacco: Never   Substance Use Topics    Alcohol use: Yes     Alcohol/week: 3.0 standard drinks of alcohol     Types: 3 Cans of beer per week      Family History   Problem Relation Age of Onset    No Known Problems Mother     Lung Disease Father     COPD Father     Skin Cancer Father     Heart Disease Father     Anesth Problems Neg Hx       Prior to Admission medications    Medication Sig Start Date End Date Taking? Authorizing Provider   furosemide (LASIX) 20 MG tablet TAKE 1 TABLET BY MOUTH DAILY  Patient taking differently: Take 1 tablet by mouth daily 10/1/24  Yes Hemant Keenan MD   lisinopril (PRINIVIL;ZESTRIL) 20 MG tablet TAKE 1 TABLET BY MOUTH DAILY 5/6/24  Yes Hemant Keenan MD   aspirin 81 MG EC tablet Take 1 tablet by mouth daily  Patient taking differently: Take 1 tablet by mouth every other day 11/21/23  Yes Hemant Keenan MD   iron polysaccharides (NIFEREX) 150 MG capsule Take 1 capsule by mouth Daily with lunch 5/19/21  Yes Automatic Reconciliation, Ar     Allergies   Allergen Reactions    Penicillins Other (See Comments)     Patient screened for any delayed non-IgE-mediated reaction to PCN.        Patient notes the following:    No delayed non-IgE-mediated reaction to PCN      MINOR MOUTH IRRITATION/ULCERS             Hydrochlorothiazide Rash        Review of Systems:    A comprehensive review of systems was negative except for that written in the History of Present Illness.    Objective:     /61   Pulse 88   Temp 98.2 °F (36.8 °C)   Resp 18   Ht 1.778 m (5' 10\")   Wt 72 kg (158 lb 11.7 oz)   SpO2 97%   BMI 22.78 kg/m²      Physical Exam:   General:  Alert, cooperative, no

## 2024-10-14 NOTE — BRIEF OP NOTE
Brief Postoperative Note      Patient: Toni Crook  YOB: 1940  MRN: [id number]    Date of Procedure: 10/14/2024    Pre-Op Diagnosis Codes:      * Malignant neoplasm of colon, unspecified part of colon (HCC) [C18.9]    Post-Op Diagnosis: Same and perforated cecal cancer with abscess       Procedure(s):  RIGHT COLECTOMY (E R A S)    Surgeon(s):  Clay Bateman MD    Assistant:  Surgical Assistant: Anne-Marie Leon    Anesthesia: General    Estimated Blood Loss (mL): less than 50     Complications: None    Specimens:   ID Type Source Tests Collected by Time Destination   1 : RIGHT COLECTOMY Tissue Colon SURGICAL PATHOLOGY Clay Bateman MD 10/14/2024 1201    A : ABDOMINAL WALL ABSCESS #1 Swab Abdomen CULTURE, ANAEROBIC, GRAM STAIN, CULTURE, WOUND Clay Bateman MD 10/14/2024 1055    B : ABDOMINAL WALL ABSCESS #2 Swab Abdomen CULTURE, ANAEROBIC, GRAM STAIN, CULTURE, WOUND Clay Bateman MD 10/14/2024 1056    C : ABDOMINAL WALL ABSCESS #3 Swab Abdomen CULTURE, ANAEROBIC, GRAM STAIN, CULTURE, WOUND Clay Bateman MD 10/14/2024 1057        Implants:  * No implants in log *      Drains:   Closed/Suction Drain LUQ Bulb (Active)       Closed/Suction Drain Lateral LLQ Bulb (Active)       Urinary Catheter 10/14/24 Hare (Active)       Findings:  Infection Present At Time Of Surgery (PATOS) (choose all levels that have infection present):  - Deep Infection (muscle/fascia) present as evidenced by abscess and pus  Other Findings: lymphadenopathy down the ileocolic arterial chain  Colon Resection  Operation performed with curative intent No   Tumor Location (select all that apply) Cecum   Extent of colon and vascular resection (select all that apply) Right hemicolectomy - ileocolic, right colic (if present)        Electronically signed by Clay Bateman MD on 10/14/2024 at 12:51 PM

## 2024-10-14 NOTE — OP NOTE
[address]  [address]                            OPERATIVE REPORT      PATIENT NAME: SIGRID CONNELL              : 1940  MED REC NO: [id number]                       ROOM: OR  ACCOUNT NO: [id number]                       ADMIT DATE: 10/14/2024  PROVIDER: Clay Bateman MD    DATE OF SERVICE:  10/14/2024    PREOPERATIVE DIAGNOSES:  ***    POSTOPERATIVE DIAGNOSES:  ***    PROCEDURES PERFORMED:  ***    SURGEON:  Clay Bateman MD    ASSISTANT:  Anne-Marie Leon.    ANESTHESIA:  General plus 30 mL of 0.25% Marcaine with epinephrine.    ESTIMATED BLOOD LOSS:  50 mL.    SPECIMENS REMOVED:  Right colon sent to pathology plus cultures of abdominal wall abscess.    INTRAOPERATIVE FINDINGS:  ***     COMPLICATIONS:  ***    IMPLANTS:  None.    INDICATIONS:  This is an 84-year-old male with a history of cecal perforation and liver metastasis.  This was obstructed ascending colon cancer or partially obstructed ascending colon cancer.  Therefore, we discussed a robotic versus open right hemicolectomy.    DESCRIPTION OF PROCEDURE:  He was consented and taken to the operating room and placed on table in a normal supine position.  After induction of anesthesia, we could tell there was a large abdominal wall mass.  This was significant in size and would not come out through the laparoscopic port.  It was fixed to the abdominal wall and therefore we elected to perform an open operation.  I injected with local anesthetic and made an incision in the midline.  The abdomen was entered, taking care to avoid underlying bowel.  I placed a wound protector.  We could easily identify a mass that was stuck to the retroperitoneum as well as to the anterior abdominal wall in the right lower quadrant.  The sigmoid colon epiploica was attached to this mass.  There was lymphadenopathy going to the take out to the proximal extent of the ileocolic artery.  I began by

## 2024-10-15 ENCOUNTER — APPOINTMENT (OUTPATIENT)
Facility: HOSPITAL | Age: 84
DRG: 329 | End: 2024-10-15
Attending: COLON & RECTAL SURGERY
Payer: MEDICARE

## 2024-10-15 LAB
ALBUMIN SERPL-MCNC: 2 G/DL (ref 3.5–5)
ALBUMIN/GLOB SERPL: 0.5 (ref 1.1–2.2)
ALP SERPL-CCNC: 86 U/L (ref 45–117)
ALT SERPL-CCNC: 22 U/L (ref 12–78)
ANION GAP SERPL CALC-SCNC: 4 MMOL/L (ref 2–12)
AST SERPL-CCNC: 23 U/L (ref 15–37)
BILIRUB SERPL-MCNC: 0.3 MG/DL (ref 0.2–1)
BUN SERPL-MCNC: 14 MG/DL (ref 6–20)
BUN/CREAT SERPL: 14 (ref 12–20)
CALCIUM SERPL-MCNC: 9.3 MG/DL (ref 8.5–10.1)
CHLORIDE SERPL-SCNC: 99 MMOL/L (ref 97–108)
CO2 SERPL-SCNC: 27 MMOL/L (ref 21–32)
CREAT SERPL-MCNC: 0.99 MG/DL (ref 0.7–1.3)
ERYTHROCYTE [DISTWIDTH] IN BLOOD BY AUTOMATED COUNT: 16.3 % (ref 11.5–14.5)
GLOBULIN SER CALC-MCNC: 3.9 G/DL (ref 2–4)
GLUCOSE BLD STRIP.AUTO-MCNC: 141 MG/DL (ref 65–117)
GLUCOSE SERPL-MCNC: 129 MG/DL (ref 65–100)
HCT VFR BLD AUTO: 26.2 % (ref 36.6–50.3)
HGB BLD-MCNC: 8.2 G/DL (ref 12.1–17)
MCH RBC QN AUTO: 23.3 PG (ref 26–34)
MCHC RBC AUTO-ENTMCNC: 31.3 G/DL (ref 30–36.5)
MCV RBC AUTO: 74.4 FL (ref 80–99)
NRBC # BLD: 0 K/UL (ref 0–0.01)
NRBC BLD-RTO: 0 PER 100 WBC
PLATELET # BLD AUTO: 230 K/UL (ref 150–400)
PMV BLD AUTO: 9.2 FL (ref 8.9–12.9)
POTASSIUM SERPL-SCNC: 4.3 MMOL/L (ref 3.5–5.1)
PROT SERPL-MCNC: 5.9 G/DL (ref 6.4–8.2)
RBC # BLD AUTO: 3.52 M/UL (ref 4.1–5.7)
SERVICE CMNT-IMP: ABNORMAL
SODIUM SERPL-SCNC: 130 MMOL/L (ref 136–145)
WBC # BLD AUTO: 13.9 K/UL (ref 4.1–11.1)

## 2024-10-15 PROCEDURE — 6360000002 HC RX W HCPCS: Performed by: COLON & RECTAL SURGERY

## 2024-10-15 PROCEDURE — 97530 THERAPEUTIC ACTIVITIES: CPT

## 2024-10-15 PROCEDURE — 6370000000 HC RX 637 (ALT 250 FOR IP): Performed by: COLON & RECTAL SURGERY

## 2024-10-15 PROCEDURE — 97165 OT EVAL LOW COMPLEX 30 MIN: CPT

## 2024-10-15 PROCEDURE — 82962 GLUCOSE BLOOD TEST: CPT

## 2024-10-15 PROCEDURE — 70450 CT HEAD/BRAIN W/O DYE: CPT

## 2024-10-15 PROCEDURE — 97161 PT EVAL LOW COMPLEX 20 MIN: CPT

## 2024-10-15 PROCEDURE — 2580000003 HC RX 258: Performed by: COLON & RECTAL SURGERY

## 2024-10-15 PROCEDURE — 85027 COMPLETE CBC AUTOMATED: CPT

## 2024-10-15 PROCEDURE — 80053 COMPREHEN METABOLIC PANEL: CPT

## 2024-10-15 PROCEDURE — 1200000000 HC SEMI PRIVATE

## 2024-10-15 RX ORDER — ALVIMOPAN 12 MG/1
12 CAPSULE ORAL 2 TIMES DAILY
Status: DISPENSED | OUTPATIENT
Start: 2024-10-15 | End: 2024-10-19

## 2024-10-15 RX ORDER — ACETAMINOPHEN 500 MG
1000 TABLET ORAL EVERY 6 HOURS SCHEDULED
Status: DISCONTINUED | OUTPATIENT
Start: 2024-10-15 | End: 2024-10-18

## 2024-10-15 RX ORDER — SODIUM CHLORIDE 9 MG/ML
INJECTION, SOLUTION INTRAVENOUS CONTINUOUS
Status: DISCONTINUED | OUTPATIENT
Start: 2024-10-15 | End: 2024-10-21

## 2024-10-15 RX ADMIN — ACETAMINOPHEN 1000 MG: 500 TABLET ORAL at 09:13

## 2024-10-15 RX ADMIN — KETOROLAC TROMETHAMINE 15 MG: 30 INJECTION, SOLUTION INTRAMUSCULAR at 12:59

## 2024-10-15 RX ADMIN — METRONIDAZOLE 500 MG: 500 INJECTION, SOLUTION INTRAVENOUS at 19:07

## 2024-10-15 RX ADMIN — ENOXAPARIN SODIUM 40 MG: 100 INJECTION SUBCUTANEOUS at 09:14

## 2024-10-15 RX ADMIN — KETOROLAC TROMETHAMINE 15 MG: 30 INJECTION, SOLUTION INTRAMUSCULAR at 19:02

## 2024-10-15 RX ADMIN — METRONIDAZOLE 500 MG: 500 INJECTION, SOLUTION INTRAVENOUS at 13:04

## 2024-10-15 RX ADMIN — ALVIMOPAN 12 MG: 12 CAPSULE ORAL at 09:13

## 2024-10-15 RX ADMIN — LEVOFLOXACIN 750 MG: 750 INJECTION, SOLUTION INTRAVENOUS at 17:03

## 2024-10-15 RX ADMIN — SODIUM CHLORIDE: 9 INJECTION, SOLUTION INTRAVENOUS at 19:01

## 2024-10-15 RX ADMIN — METRONIDAZOLE 500 MG: 500 INJECTION, SOLUTION INTRAVENOUS at 03:26

## 2024-10-15 RX ADMIN — KETOROLAC TROMETHAMINE 15 MG: 30 INJECTION, SOLUTION INTRAMUSCULAR at 06:08

## 2024-10-15 RX ADMIN — ALVIMOPAN 12 MG: 12 CAPSULE ORAL at 22:02

## 2024-10-15 RX ADMIN — ACETAMINOPHEN 1000 MG: 500 TABLET ORAL at 19:03

## 2024-10-15 ASSESSMENT — PAIN SCALES - GENERAL
PAINLEVEL_OUTOF10: 3
PAINLEVEL_OUTOF10: 1
PAINLEVEL_OUTOF10: 3
PAINLEVEL_OUTOF10: 3

## 2024-10-15 ASSESSMENT — PAIN DESCRIPTION - LOCATION
LOCATION: ABDOMEN

## 2024-10-15 ASSESSMENT — PAIN DESCRIPTION - DESCRIPTORS
DESCRIPTORS: ACHING
DESCRIPTORS: ACHING
DESCRIPTORS: SHOOTING
DESCRIPTORS: ACHING

## 2024-10-15 ASSESSMENT — PAIN DESCRIPTION - PAIN TYPE: TYPE: ACUTE PAIN

## 2024-10-15 ASSESSMENT — PAIN DESCRIPTION - ORIENTATION: ORIENTATION: ANTERIOR

## 2024-10-15 NOTE — PROGRESS NOTES
Pt looks good. 2 BM. No n/v/f/c  Unfortunately he had a slight weakness episode and code stroke was called. The CT was negative. He is weak but no sequelae  vss afebrile    Chest: clear. No rub, gallop  Lung: clear no wheeze  Abd: soft, non tender, + bs, mildly distended, inc c/I, Wayne serosanguinous and no sign of abscess    Lab WBC:13.9 Hgb: 8.2 Na130 K:4.3    A/P POD # 1, Looks good for now,   2. Will advance diet as tolerated, adjust fluids  3. Drains stay  4. Dr. Bateman returns tomorrow

## 2024-10-15 NOTE — SIGNIFICANT EVENT
Rapid Response  Rapid response room 506 called overhead at 1505. RRT responding.    Rapid response called for  ataxia    Pt was working with PT/OT when they noted pt was leaning to the R. There was also some concern for nystagmus at that time.    Upon assessment, noted some slight RLE weakness. No other focal deficits noted.    Pt is under surgery's care, therefore no hospitalist would be responding to bedside. As a precaution, this RN requested to call a Code Stroke overhead.    LKW 1500. . 121/63.    ARLIN Hernández NP at bedside. Due to major GI surgery yesterday, pt would clearly not be a TNK candidate and there are no concerns for an LVO given his presentation. Therefore, the Code Stroke was cancelled, but elected to continue with the dry head CT.    Pt transported to CT and back on monitor with Rola, primary RN and this RN. Transport uneventful. Family updated upon return.    Checked in with primary RN prior to leaving. Opportunity for questions and concerns provided.      Rapid Response Team Sepsis Screening  Is the patient's history suggestive of a new infection? No    Are two or more SIRS criteria present? No    Is there evidence of Organ Dysfunction? [facility] Sepsis OD: None    Communication with provider: No    Was a Code Sepsis called at this encounter? No. Why? Not indicated.

## 2024-10-15 NOTE — CONSULTS
Nutrition Education    Chart reviewed; RD provided a 5-day supply of Ensure Surgery/Immunonutrition (10 bottles) to the bedside per ERAS protocol and discussed the importance of adequate nutrition/supplement compliance in effort to optimize wound healing and recovery from surgery. Pt agreeable and reports that he consumed the Ensure Surgery pre-op. RD anticipates compliance.     Educated on Low Fiber Diet  Learners: Patient  Readiness: Acceptance  Method: Explanation and Handout  Response: Verbalizes Understanding  Contact name and number provided.    Ange Melendez RD  Contact via [employer]

## 2024-10-15 NOTE — PLAN OF CARE
Problem: Physical Therapy - Adult  Goal: By Discharge: Performs mobility at highest level of function for planned discharge setting.  See evaluation for individualized goals.  Description: FUNCTIONAL STATUS PRIOR TO ADMISSION: Patient was modified independent and active.    HOME SUPPORT PRIOR TO ADMISSION: The patient lived with spouse but did not require assistance.    Physical Therapy Goals  Initiated 10/15/2024  1.  Patient will move from supine to sit and sit to supine in bed with minimal assistance within 7 day(s).    2.  Patient will perform sit to stand with minimal assistance within 7 day(s).  3.  Patient will transfer from bed to chair and chair to bed with minimal assistance using the least restrictive device within 7 day(s).  4.  Patient will ambulate with moderate assistance for 10 feet with the least restrictive device within 7 day(s).     Outcome: Progressing   PHYSICAL THERAPY EVALUATION    Patient: Toni Crook (84 y.o. male)  Date: 10/15/2024  Primary Diagnosis: Malignant neoplasm of colon, unspecified part of colon (HCC) [C18.9]  Cecal cancer (HCC) [C18.0]  Procedure(s) (LRB):  RIGHT COLECTOMY (E R A S) (N/A) 1 Day Post-Op   Precautions:                        ASSESSMENT :   DEFICITS/IMPAIRMENTS:   The patient is limited by decreased functional mobility, activity tolerance, cognition, coordination, balance post-op day 1 R colectomy. His Pmhx is remarkable for colon and cecal cancer. He is received supine in bed. Education is provided on use of log roll and patient requires Max Ax2 for supine to sit transition. Patient demonstrates poor static sitting balance requiring verbal/tactile cues and physical assistance to achieve midline. Patient is able to self correct provided cueing but cannot maintain for longer than 30 seconds to one minute. He requires Max A to achieve standing and demonstrates markedly increased WILFREDO. Patient demonstrates heavy right posterior lean and is unable to achieve

## 2024-10-15 NOTE — PROGRESS NOTES
Neurocritical Care Code Stroke Documentation      Symptoms: Right sided ataxia, patient was working with therapy when they noted that he was leaning to the right   Baseline mRS:  2-3   Last Known Well: 1500   Medical hx: Past Medical History:   Diagnosis Date    Acne rosacea 2010    Atrial fibrillation (HCC)     Cancer (HCC) 10/2024    COLON    HTN (hypertension) 2010    Hypercholesteremia 2010      Vitals: Vitals:    10/15/24 1500   BP: 121/63   Pulse: 85   Resp: 16   Temp: 99 °F (37.2 °C)   SpO2: 95%      Anticoagulation: Home ASA 81 mg stopped for surgery, lovenox now for DVT   VAN:   Negative   NIHSS:   1a-LOC: 0    1b-Month/Age: 0    1c-Open/Close Hand: 0    2-Best Gaze: 0    3-Visual Fields: 0    4-Facial Palsy: 0    5a-Left Arm: 0    5b-Right Arm: 0    6a-Left Le    6b-Right Le    7-Limb Ataxia: 0    8-Sensory: 0    9-Best Language: 0    10-Dysarthria: 0    11-Extinction/Inattention: 0  TOTAL SCORE:   Imaging (personally reviewed):   CT:  No acute process      Plan:   TNK Candidate: No-major abdominal surgery on 10/14/2024    Mechanical thrombectomy Candidate: NO    *Perform dysphagia screening prior to any PO intake*     Patient working with therapy who noted that he was leaning to the right.  Upon my arrival to the unit patient's family was at bedside who stated that he was at his baseline, upon physical exam patient with equal strength bilaterally and no evidence of nystagmus.  Bedside nurse reports intermittent right lower extremity weakness.  Given that patient would not be a candidate for TNK due to major abdominal surgery yesterday and that he had no evidence of large vessel occlusion a dry CT was performed and the code stroke was aborted.    Recommendations:  MRI brain and Neurology consult if symptoms return  Restart ASA when okay with primary team    1800 case discussed with Dr. Alvarez.    Arrival time: 1526    I have spent 40 of critical care time involved in lab review,

## 2024-10-15 NOTE — PLAN OF CARE
Problem: Occupational Therapy - Adult  Goal: By Discharge: Performs self-care activities at highest level of function for planned discharge setting.  See evaluation for individualized goals.  Description: FUNCTIONAL STATUS PRIOR TO ADMISSION:  Patient was ambulatory using no DME   , ADL Assistance: Independent,  ,  ,  ,  ,  , Homemaking Assistance: Independent, Ambulation Assistance: Independent, Transfer Assistance: Independent,       HOME SUPPORT: Patient lived with spouse but didn't require assistance.    Occupational Therapy Goals:  Initiated 10/15/2024  1.  Patient will perform grooming with Minimal Assist in unsupported sitting EOB within 7 day(s).  2.  Patient will perform bathing with Moderate Assist within 7 day(s).  3.  Patient will perform lower body dressing with Moderate Assist within 7 day(s).  4.  Patient will perform toilet transfers with Moderate Assist and Assist x1 to [facility]  within 7 day(s).  5.  Patient will perform all aspects of toileting with Moderate Assist within 7 day(s).  6.  Patient will participate in upper extremity therapeutic exercise/activities with Minimal Assist for 5 minutes within 7 day(s).    7.  Patient will utilize energy conservation techniques during functional activities with verbal cues within 7 day(s).   Outcome: Progressing   OCCUPATIONAL THERAPY EVALUATION    Patient: Toni Crook (84 y.o. male)  Date: 10/15/2024  Primary Diagnosis: Malignant neoplasm of colon, unspecified part of colon (HCC) [C18.9]  Cecal cancer (HCC) [C18.0]  Procedure(s) (LRB):  RIGHT COLECTOMY (E R A S) (N/A) 1 Day Post-Op     Precautions:                    ASSESSMENT :  The patient is limited by decreased functional mobility, independence in ADLs, high-level IADLs, strength, body mechanics, activity tolerance, endurance, safety awareness, coordination, balance, proprioception, vision/visual deficit, posture. POD #1 right colectomy.  Prior to admission pt was active playing [employer] and

## 2024-10-15 NOTE — ANESTHESIA POSTPROCEDURE EVALUATION
Department of Anesthesiology  Postprocedure Note    Patient: Toni Crook  MRN: [id number]  YOB: 1940  Date of evaluation: 10/15/2024    Procedure Summary       Date: 10/14/24 Room / Location: [facility] MAIN OR [address] [place] [address] MAIN OR    Anesthesia Start: 1008 Anesthesia Stop: 1301    Procedure: RIGHT COLECTOMY (E R A S) (Abdomen) Diagnosis:       Malignant neoplasm of colon, unspecified part of colon (HCC)      (Malignant neoplasm of colon, unspecified part of colon (HCC) [C18.9])    Providers: Clay Bateman MD Responsible Provider: Hmeant Younger MD    Anesthesia Type: General ASA Status: 3            Anesthesia Type: General    Raven Phase I: Raven Score: 9    Raven Phase II:      Anesthesia Post Evaluation  Post-Anesthesia Evaluation and Assessment    Patient: Toni Crook MRN: [id number]  SSN: [id number]    YOB: 1940  Age: 84 y.o.  Sex: male      I have evaluated the patient and they are stable and ready for discharge from the PACU.     Cardiovascular Function/Vital Signs  Visit Vitals  /77   Pulse 93   Temp 98.1 °F (36.7 °C)   Resp 17   Ht 1.778 m (5' 10\")   Wt 72 kg (158 lb 11.7 oz)   SpO2 94%   BMI 22.78 kg/m²       Patient is status post General anesthesia for Procedure(s):  RIGHT COLECTOMY (E R A S).    Nausea/Vomiting: None    Postoperative hydration reviewed and adequate.    Pain:      Managed    Neurological Status:       At baseline    Mental Status, Level of Consciousness: Arousable    Pulmonary Status:       Adequate oxygenation and airway patent    Complications related to anesthesia: None    Post-anesthesia assessment completed. No concerns    Signed By: Hemant Younger MD     October 15, 2024                No notable events documented.

## 2024-10-16 LAB
ANION GAP SERPL CALC-SCNC: 6 MMOL/L (ref 2–12)
BACTERIA SPEC CULT: ABNORMAL
BACTERIA SPEC CULT: NORMAL
BACTERIA SPEC CULT: NORMAL
BUN SERPL-MCNC: 16 MG/DL (ref 6–20)
BUN/CREAT SERPL: 15 (ref 12–20)
CALCIUM SERPL-MCNC: 9.2 MG/DL (ref 8.5–10.1)
CHLORIDE SERPL-SCNC: 96 MMOL/L (ref 97–108)
CO2 SERPL-SCNC: 28 MMOL/L (ref 21–32)
CREAT SERPL-MCNC: 1.04 MG/DL (ref 0.7–1.3)
ERYTHROCYTE [DISTWIDTH] IN BLOOD BY AUTOMATED COUNT: 16.1 % (ref 11.5–14.5)
GLUCOSE SERPL-MCNC: 99 MG/DL (ref 65–100)
GRAM STN SPEC: ABNORMAL
HCT VFR BLD AUTO: 29.6 % (ref 36.6–50.3)
HGB BLD-MCNC: 9.1 G/DL (ref 12.1–17)
MCH RBC QN AUTO: 23.2 PG (ref 26–34)
MCHC RBC AUTO-ENTMCNC: 30.7 G/DL (ref 30–36.5)
MCV RBC AUTO: 75.3 FL (ref 80–99)
NRBC # BLD: 0 K/UL (ref 0–0.01)
NRBC BLD-RTO: 0 PER 100 WBC
PLATELET # BLD AUTO: 309 K/UL (ref 150–400)
PMV BLD AUTO: 9.7 FL (ref 8.9–12.9)
POTASSIUM SERPL-SCNC: 4.3 MMOL/L (ref 3.5–5.1)
RBC # BLD AUTO: 3.93 M/UL (ref 4.1–5.7)
SERVICE CMNT-IMP: ABNORMAL
SERVICE CMNT-IMP: NORMAL
SERVICE CMNT-IMP: NORMAL
SODIUM SERPL-SCNC: 130 MMOL/L (ref 136–145)
WBC # BLD AUTO: 16 K/UL (ref 4.1–11.1)

## 2024-10-16 PROCEDURE — 6370000000 HC RX 637 (ALT 250 FOR IP): Performed by: COLON & RECTAL SURGERY

## 2024-10-16 PROCEDURE — 6360000002 HC RX W HCPCS: Performed by: COLON & RECTAL SURGERY

## 2024-10-16 PROCEDURE — 36415 COLL VENOUS BLD VENIPUNCTURE: CPT

## 2024-10-16 PROCEDURE — 85027 COMPLETE CBC AUTOMATED: CPT

## 2024-10-16 PROCEDURE — 97535 SELF CARE MNGMENT TRAINING: CPT

## 2024-10-16 PROCEDURE — 97530 THERAPEUTIC ACTIVITIES: CPT

## 2024-10-16 PROCEDURE — 2580000003 HC RX 258: Performed by: COLON & RECTAL SURGERY

## 2024-10-16 PROCEDURE — 97116 GAIT TRAINING THERAPY: CPT

## 2024-10-16 PROCEDURE — 1200000000 HC SEMI PRIVATE

## 2024-10-16 PROCEDURE — 80048 BASIC METABOLIC PNL TOTAL CA: CPT

## 2024-10-16 RX ORDER — ZOLPIDEM TARTRATE 5 MG/1
5 TABLET ORAL NIGHTLY PRN
Status: DISCONTINUED | OUTPATIENT
Start: 2024-10-16 | End: 2024-10-17

## 2024-10-16 RX ADMIN — SODIUM CHLORIDE: 9 INJECTION, SOLUTION INTRAVENOUS at 05:48

## 2024-10-16 RX ADMIN — KETOROLAC TROMETHAMINE 15 MG: 30 INJECTION, SOLUTION INTRAMUSCULAR at 00:15

## 2024-10-16 RX ADMIN — METRONIDAZOLE 500 MG: 500 INJECTION, SOLUTION INTRAVENOUS at 18:30

## 2024-10-16 RX ADMIN — ZOLPIDEM TARTRATE 5 MG: 5 TABLET ORAL at 21:08

## 2024-10-16 RX ADMIN — ACETAMINOPHEN 1000 MG: 500 TABLET ORAL at 19:07

## 2024-10-16 RX ADMIN — METRONIDAZOLE 500 MG: 500 INJECTION, SOLUTION INTRAVENOUS at 12:41

## 2024-10-16 RX ADMIN — ACETAMINOPHEN 1000 MG: 500 TABLET ORAL at 12:41

## 2024-10-16 RX ADMIN — ACETAMINOPHEN 1000 MG: 500 TABLET ORAL at 05:48

## 2024-10-16 RX ADMIN — ENOXAPARIN SODIUM 40 MG: 100 INJECTION SUBCUTANEOUS at 09:07

## 2024-10-16 RX ADMIN — LEVOFLOXACIN 750 MG: 750 INJECTION, SOLUTION INTRAVENOUS at 20:35

## 2024-10-16 RX ADMIN — METRONIDAZOLE 500 MG: 500 INJECTION, SOLUTION INTRAVENOUS at 03:05

## 2024-10-16 RX ADMIN — ALVIMOPAN 12 MG: 12 CAPSULE ORAL at 21:08

## 2024-10-16 RX ADMIN — ALVIMOPAN 12 MG: 12 CAPSULE ORAL at 09:07

## 2024-10-16 RX ADMIN — ACETAMINOPHEN 1000 MG: 500 TABLET ORAL at 00:15

## 2024-10-16 RX ADMIN — SODIUM CHLORIDE, PRESERVATIVE FREE 10 ML: 5 INJECTION INTRAVENOUS at 21:08

## 2024-10-16 ASSESSMENT — PAIN DESCRIPTION - LOCATION
LOCATION: ABDOMEN

## 2024-10-16 ASSESSMENT — PAIN SCALES - GENERAL
PAINLEVEL_OUTOF10: 2
PAINLEVEL_OUTOF10: 0
PAINLEVEL_OUTOF10: 0
PAINLEVEL_OUTOF10: 2

## 2024-10-16 NOTE — PLAN OF CARE
Problem: Physical Therapy - Adult  Goal: By Discharge: Performs mobility at highest level of function for planned discharge setting.  See evaluation for individualized goals.  Description: FUNCTIONAL STATUS PRIOR TO ADMISSION: Patient was modified independent and active.    HOME SUPPORT PRIOR TO ADMISSION: The patient lived with spouse but did not require assistance.    Physical Therapy Goals  Initiated 10/15/2024  1.  Patient will move from supine to sit and sit to supine in bed with minimal assistance within 7 day(s).    2.  Patient will perform sit to stand with minimal assistance within 7 day(s).  3.  Patient will transfer from bed to chair and chair to bed with minimal assistance using the least restrictive device within 7 day(s).  4.  Patient will ambulate with moderate assistance for 10 feet with the least restrictive device within 7 day(s).     Outcome: Progressing   PHYSICAL THERAPY TREATMENT    Patient: Toni Crook (84 y.o. male)  Date: 10/16/2024  Diagnosis: Malignant neoplasm of colon, unspecified part of colon (HCC) [C18.9]  Cecal cancer (HCC) [C18.0] Cecal cancer (HCC)  Procedure(s) (LRB):  RIGHT COLECTOMY (E R A S) (N/A) 2 Days Post-Op  Precautions:                        ASSESSMENT:  Patient continues to benefit from skilled PT services and is progressing towards goals. Pt tolerated session well. Pt demonstrated significant improvement in all aspects of mobility and tolerance to activity compared to yesterday's evaluation. Pt required CGA bed mobility via log roll technique and completed sit<>stand with RW with CGA. Pt tolerated gait training x 80 ft with RW with CGA-SBA demonstrating slow rolando, generalized unsteadiness, but no LOB noted. Pt educated on importance of remaining OOB and frequent mobilization with RN staff to progress mobility as tolerated.     PLAN:  Patient continues to benefit from skilled intervention to address the above impairments.  Continue treatment per established

## 2024-10-16 NOTE — PROGRESS NOTES
Had a bm. No flatus. Having some burping. Less double vision now. Feels more stable. Lido gtt is off now.   Vitals:    10/16/24 0818   BP: (!) 140/83   Pulse: (!) 103   Resp: 18   Temp: 97.7 °F (36.5 °C)   SpO2: 95%       Intake/Output Summary (Last 24 hours) at 10/16/2024 1424  Last data filed at 10/16/2024 0720  Gross per 24 hour   Intake 780 ml   Output 1480 ml   Net -700 ml     Abd softly distended  Wayne serosang    A/p waiting for more flatus.  His blood is hemoconcentrated so likely reason for increase in wbc and hgb.   Should start to mobilize third space fluid in the next 24 hours  Encouraged ambulation   If not taking good po tomorrow, may start some fluids  Discussed with Dr. Matthews as well who will see him tomorrow.

## 2024-10-16 NOTE — PLAN OF CARE
Problem: Occupational Therapy - Adult  Goal: By Discharge: Performs self-care activities at highest level of function for planned discharge setting.  See evaluation for individualized goals.  Description: FUNCTIONAL STATUS PRIOR TO ADMISSION:  Patient was ambulatory using no DME   , ADL Assistance: Independent,  ,  ,  ,  ,  , Homemaking Assistance: Independent, Ambulation Assistance: Independent, Transfer Assistance: Independent,       HOME SUPPORT: Patient lived with spouse but didn't require assistance.    Occupational Therapy Goals:  Initiated 10/15/2024  1.  Patient will perform grooming with Minimal Assist in unsupported sitting EOB within 7 day(s).  2.  Patient will perform bathing with Moderate Assist within 7 day(s).  3.  Patient will perform lower body dressing with Moderate Assist within 7 day(s).  4.  Patient will perform toilet transfers with Moderate Assist and Assist x1 to [facility]  within 7 day(s).  5.  Patient will perform all aspects of toileting with Moderate Assist within 7 day(s).  6.  Patient will participate in upper extremity therapeutic exercise/activities with Minimal Assist for 5 minutes within 7 day(s).    7.  Patient will utilize energy conservation techniques during functional activities with verbal cues within 7 day(s).   Outcome: Progressing   OCCUPATIONAL THERAPY TREATMENT  Patient: Toni Crook (84 y.o. male)  Date: 10/16/2024  Primary Diagnosis: Malignant neoplasm of colon, unspecified part of colon (HCC) [C18.9]  Cecal cancer (HCC) [C18.0]  Procedure(s) (LRB):  RIGHT COLECTOMY (E R A S) (N/A) 2 Days Post-Op   Precautions:                  Chart, occupational therapy assessment, plan of care, and goals were reviewed.    ASSESSMENT  Patient continues to benefit from skilled OT services and is progressing towards goals. Pt made significant gains compared to yesterday.  Supportive wife present in the room. Pt presents at CGA to SBA level with mobility and self-care (pt was max assist x

## 2024-10-17 ENCOUNTER — APPOINTMENT (OUTPATIENT)
Facility: HOSPITAL | Age: 84
DRG: 329 | End: 2024-10-17
Attending: COLON & RECTAL SURGERY
Payer: MEDICARE

## 2024-10-17 LAB
ALBUMIN SERPL-MCNC: 2.1 G/DL (ref 3.5–5)
ALBUMIN/GLOB SERPL: 0.7 (ref 1.1–2.2)
ALP SERPL-CCNC: 96 U/L (ref 45–117)
ALT SERPL-CCNC: 20 U/L (ref 12–78)
ANION GAP SERPL CALC-SCNC: 6 MMOL/L (ref 2–12)
AST SERPL-CCNC: 29 U/L (ref 15–37)
BACTERIA SPEC CULT: ABNORMAL
BILIRUB SERPL-MCNC: 0.3 MG/DL (ref 0.2–1)
BUN SERPL-MCNC: 15 MG/DL (ref 6–20)
BUN/CREAT SERPL: 18 (ref 12–20)
CALCIUM SERPL-MCNC: 9.4 MG/DL (ref 8.5–10.1)
CHLORIDE SERPL-SCNC: 98 MMOL/L (ref 97–108)
CO2 SERPL-SCNC: 27 MMOL/L (ref 21–32)
CREAT SERPL-MCNC: 0.84 MG/DL (ref 0.7–1.3)
ERYTHROCYTE [DISTWIDTH] IN BLOOD BY AUTOMATED COUNT: 16.3 % (ref 11.5–14.5)
GLOBULIN SER CALC-MCNC: 3.1 G/DL (ref 2–4)
GLUCOSE SERPL-MCNC: 112 MG/DL (ref 65–100)
GRAM STN SPEC: ABNORMAL
GRAM STN SPEC: ABNORMAL
HCT VFR BLD AUTO: 31.2 % (ref 36.6–50.3)
HGB BLD-MCNC: 9.7 G/DL (ref 12.1–17)
MCH RBC QN AUTO: 23.3 PG (ref 26–34)
MCHC RBC AUTO-ENTMCNC: 31.1 G/DL (ref 30–36.5)
MCV RBC AUTO: 75 FL (ref 80–99)
NRBC # BLD: 0 K/UL (ref 0–0.01)
NRBC BLD-RTO: 0 PER 100 WBC
PLATELET # BLD AUTO: 390 K/UL (ref 150–400)
PMV BLD AUTO: 9.2 FL (ref 8.9–12.9)
POTASSIUM SERPL-SCNC: 4.4 MMOL/L (ref 3.5–5.1)
PROT SERPL-MCNC: 5.2 G/DL (ref 6.4–8.2)
RBC # BLD AUTO: 4.16 M/UL (ref 4.1–5.7)
SERVICE CMNT-IMP: ABNORMAL
SODIUM SERPL-SCNC: 131 MMOL/L (ref 136–145)
WBC # BLD AUTO: 22 K/UL (ref 4.1–11.1)

## 2024-10-17 PROCEDURE — 51798 US URINE CAPACITY MEASURE: CPT

## 2024-10-17 PROCEDURE — 6360000002 HC RX W HCPCS: Performed by: COLON & RECTAL SURGERY

## 2024-10-17 PROCEDURE — 1200000000 HC SEMI PRIVATE

## 2024-10-17 PROCEDURE — 74018 RADEX ABDOMEN 1 VIEW: CPT

## 2024-10-17 PROCEDURE — 36415 COLL VENOUS BLD VENIPUNCTURE: CPT

## 2024-10-17 PROCEDURE — 97530 THERAPEUTIC ACTIVITIES: CPT

## 2024-10-17 PROCEDURE — 6370000000 HC RX 637 (ALT 250 FOR IP): Performed by: COLON & RECTAL SURGERY

## 2024-10-17 PROCEDURE — 80053 COMPREHEN METABOLIC PANEL: CPT

## 2024-10-17 PROCEDURE — 2580000003 HC RX 258: Performed by: COLON & RECTAL SURGERY

## 2024-10-17 PROCEDURE — 85027 COMPLETE CBC AUTOMATED: CPT

## 2024-10-17 PROCEDURE — 97535 SELF CARE MNGMENT TRAINING: CPT

## 2024-10-17 RX ORDER — LEVOFLOXACIN 5 MG/ML
500 INJECTION, SOLUTION INTRAVENOUS EVERY 24 HOURS
Status: DISCONTINUED | OUTPATIENT
Start: 2024-10-18 | End: 2024-10-23

## 2024-10-17 RX ORDER — LEVOFLOXACIN 5 MG/ML
250 INJECTION, SOLUTION INTRAVENOUS EVERY 24 HOURS
Status: DISCONTINUED | OUTPATIENT
Start: 2024-10-18 | End: 2024-10-23

## 2024-10-17 RX ADMIN — METRONIDAZOLE 500 MG: 500 INJECTION, SOLUTION INTRAVENOUS at 11:39

## 2024-10-17 RX ADMIN — SODIUM CHLORIDE, PRESERVATIVE FREE 10 ML: 5 INJECTION INTRAVENOUS at 09:24

## 2024-10-17 RX ADMIN — SODIUM CHLORIDE 25 ML: 9 INJECTION, SOLUTION INTRAVENOUS at 19:26

## 2024-10-17 RX ADMIN — ENOXAPARIN SODIUM 40 MG: 100 INJECTION SUBCUTANEOUS at 09:22

## 2024-10-17 RX ADMIN — SODIUM CHLORIDE 25 ML: 9 INJECTION, SOLUTION INTRAVENOUS at 15:05

## 2024-10-17 RX ADMIN — METRONIDAZOLE 500 MG: 500 INJECTION, SOLUTION INTRAVENOUS at 19:27

## 2024-10-17 RX ADMIN — LEVOFLOXACIN 750 MG: 750 INJECTION, SOLUTION INTRAVENOUS at 15:09

## 2024-10-17 RX ADMIN — ALVIMOPAN 12 MG: 12 CAPSULE ORAL at 09:30

## 2024-10-17 RX ADMIN — ACETAMINOPHEN 1000 MG: 500 TABLET ORAL at 23:50

## 2024-10-17 RX ADMIN — SODIUM CHLORIDE, PRESERVATIVE FREE 10 ML: 5 INJECTION INTRAVENOUS at 20:46

## 2024-10-17 RX ADMIN — ALVIMOPAN 12 MG: 12 CAPSULE ORAL at 20:46

## 2024-10-17 RX ADMIN — METRONIDAZOLE 500 MG: 500 INJECTION, SOLUTION INTRAVENOUS at 04:04

## 2024-10-17 RX ADMIN — SODIUM CHLORIDE 25 ML: 9 INJECTION, SOLUTION INTRAVENOUS at 11:36

## 2024-10-17 RX ADMIN — ACETAMINOPHEN 1000 MG: 500 TABLET ORAL at 11:31

## 2024-10-17 ASSESSMENT — PAIN SCALES - GENERAL
PAINLEVEL_OUTOF10: 0

## 2024-10-17 NOTE — PLAN OF CARE
Problem: Occupational Therapy - Adult  Goal: By Discharge: Performs self-care activities at highest level of function for planned discharge setting.  See evaluation for individualized goals.  Description: FUNCTIONAL STATUS PRIOR TO ADMISSION:  Patient was ambulatory using no DME   , ADL Assistance: Independent,  ,  ,  ,  ,  , Homemaking Assistance: Independent, Ambulation Assistance: Independent, Transfer Assistance: Independent,       HOME SUPPORT: Patient lived with spouse but didn't require assistance.    Occupational Therapy Goals:  Initiated 10/15/2024  1.  Patient will perform grooming with Minimal Assist in unsupported sitting EOB within 7 day(s).  2.  Patient will perform bathing with Moderate Assist within 7 day(s).  3.  Patient will perform lower body dressing with Moderate Assist within 7 day(s).  4.  Patient will perform toilet transfers with Moderate Assist and Assist x1 to [facility]  within 7 day(s).  5.  Patient will perform all aspects of toileting with Moderate Assist within 7 day(s).  6.  Patient will participate in upper extremity therapeutic exercise/activities with Minimal Assist for 5 minutes within 7 day(s).    7.  Patient will utilize energy conservation techniques during functional activities with verbal cues within 7 day(s).   Outcome: Progressing     OCCUPATIONAL THERAPY TREATMENT  Patient: Toni Crook (84 y.o. male)  Date: 10/17/2024  Primary Diagnosis: Malignant neoplasm of colon, unspecified part of colon (HCC) [C18.9]  Cecal cancer (HCC) [C18.0]  Procedure(s) (LRB):  RIGHT COLECTOMY (E R A S) (N/A) 3 Days Post-Op   Precautions:                  Chart, occupational therapy assessment, plan of care, and goals were reviewed.    ASSESSMENT  Patient continues to benefit from skilled OT services and is progressing towards goals, making steady ADL and functional mobility gains with SBA-CGA this session. He continues to require min-mod cues for safety awareness, luana with RW management and

## 2024-10-17 NOTE — PROGRESS NOTES
Mookie Borden,  Please review message below. The pharmacist at [facility] states the Glipizide does not come in a 4 mg but 5 mg. Please review and advise.  Thank you.  Signed:  VON Schafer Paul G., MD Rashid, Carlinda, LPN  Caller: Unspecified (3 days ago,  4:52 PM)  Ok for glipizide 4mg bid #60 2 refills    Change to glipizide?   Patient vomited 3x and greenish in color. RN noted abdominal distention and very sluggish bowel sounds. Called Dr. Bateman, ordered KUB stat and NG insertion if patient still vomits.

## 2024-10-17 NOTE — PROGRESS NOTES
Patient was able to urinate once standing. Output 325 into urinal. Will take patient for a walk after he finishes eating.

## 2024-10-17 NOTE — PROGRESS NOTES
Patient ambulated around the unit with nurse.  He was steady on his feet. He is currently alert and oriented seated in the chair.

## 2024-10-17 NOTE — CONSULTS
[place] [id number]                              CONSULTATION      PATIENT NAME: SIGRID CONNELL              : 1940  MED REC NO: [id number]                       ROOM: 506  ACCOUNT NO: [id number]                       ADMIT DATE: 10/14/2024  PROVIDER: Yoel Matthews MD    DATE OF SERVICE:  10/17/2024    ATTENDING PHYSICIAN:  HARLEY GRANT    HISTORY OF PRESENT ILLNESS:  The patient is a previously healthy 84-year-old gentleman with recently diagnosed metastatic cecal carcinoma, who is seen for Hematology/Oncology evaluation regarding this problem.  The patient has been followed at [facility] for iron-deficiency anemia for several years and has received IV iron multiple times.  He had been reluctant to have a colonoscopy and had been encouraged to have one.  He was admitted to [facility] for resection of a cecal mass, which was performed on 10/14.  He did well with the procedure initially.  He did have an episode of right-sided ataxia on 10/15.  These symptoms have since resolved.  His perioperative CT imaging identified a 3.5 cm hepatic mass as well as indeterminate 5 mm left upper lobe nodule and right lower quadrant enlarged lymph nodes.    PAST MEDICAL HISTORY:  Significant for atrial fibrillation, thrombocytopenia, hypertension, hypercholesterolemia.  He has had cardiac ablation and Watchman procedure performed.  He has had cataract surgery, wisdom tooth extraction.    SOCIAL HISTORY:  He is a nonsmoker.  Drinks alcohol socially.  Very active prior to the surgery.  Golfing on a regular basis.  He is .    FAMILY HISTORY:  Negative for malignancy.    ALLERGIES:  TO PENICILLIN, HYDROCHLOROTHIAZIDE.      REVIEW OF SYSTEMS:  As noted above, otherwise noncontributory.    PHYSICAL EXAMINATION:  GENERAL:  He is a pleasant, well-developed gentleman, in no apparent distress.  VITAL SIGNS:  Temp 97, pulse 73, /67.  HEENT:  EOMI.

## 2024-10-17 NOTE — PROGRESS NOTES
Patient vomited overnight. XR shows likely ileus.   No complaints this am  Vitals:    10/17/24 0755   BP: 135/67   Pulse: 73   Resp: 16   Temp: 97.3 °F (36.3 °C)   SpO2: 95%       Intake/Output Summary (Last 24 hours) at 10/17/2024 1025  Last data filed at 10/17/2024 0534  Gross per 24 hour   Intake 370 ml   Output 200 ml   Net 170 ml     Abd softly distended    Drains serosang      A/p npo  Ivf  Ngt if vomits  Encourage ambulation   Check labs

## 2024-10-18 ENCOUNTER — APPOINTMENT (OUTPATIENT)
Facility: HOSPITAL | Age: 84
DRG: 329 | End: 2024-10-18
Attending: COLON & RECTAL SURGERY
Payer: MEDICARE

## 2024-10-18 PROCEDURE — 97116 GAIT TRAINING THERAPY: CPT

## 2024-10-18 PROCEDURE — 2580000003 HC RX 258: Performed by: COLON & RECTAL SURGERY

## 2024-10-18 PROCEDURE — 6360000004 HC RX CONTRAST MEDICATION: Performed by: RADIOLOGY

## 2024-10-18 PROCEDURE — 6360000002 HC RX W HCPCS: Performed by: COLON & RECTAL SURGERY

## 2024-10-18 PROCEDURE — 74018 RADEX ABDOMEN 1 VIEW: CPT

## 2024-10-18 PROCEDURE — 1200000000 HC SEMI PRIVATE

## 2024-10-18 PROCEDURE — 74177 CT ABD & PELVIS W/CONTRAST: CPT

## 2024-10-18 PROCEDURE — 97530 THERAPEUTIC ACTIVITIES: CPT

## 2024-10-18 RX ORDER — IOPAMIDOL 755 MG/ML
100 INJECTION, SOLUTION INTRAVASCULAR
Status: COMPLETED | OUTPATIENT
Start: 2024-10-18 | End: 2024-10-18

## 2024-10-18 RX ORDER — ACETAMINOPHEN 500 MG
1000 TABLET ORAL EVERY 6 HOURS PRN
Status: DISCONTINUED | OUTPATIENT
Start: 2024-10-18 | End: 2024-10-24 | Stop reason: HOSPADM

## 2024-10-18 RX ADMIN — SODIUM CHLORIDE: 9 INJECTION, SOLUTION INTRAVENOUS at 20:12

## 2024-10-18 RX ADMIN — METRONIDAZOLE 500 MG: 500 INJECTION, SOLUTION INTRAVENOUS at 03:00

## 2024-10-18 RX ADMIN — METRONIDAZOLE 500 MG: 500 INJECTION, SOLUTION INTRAVENOUS at 11:33

## 2024-10-18 RX ADMIN — LEVOFLOXACIN 500 MG: 5 INJECTION, SOLUTION INTRAVENOUS at 16:59

## 2024-10-18 RX ADMIN — ENOXAPARIN SODIUM 40 MG: 100 INJECTION SUBCUTANEOUS at 11:34

## 2024-10-18 RX ADMIN — LEVOFLOXACIN 250 MG: 5 INJECTION, SOLUTION INTRAVENOUS at 18:29

## 2024-10-18 RX ADMIN — SODIUM CHLORIDE: 9 INJECTION, SOLUTION INTRAVENOUS at 07:02

## 2024-10-18 RX ADMIN — IOPAMIDOL 100 ML: 755 INJECTION, SOLUTION INTRAVENOUS at 08:41

## 2024-10-18 RX ADMIN — METRONIDAZOLE 500 MG: 500 INJECTION, SOLUTION INTRAVENOUS at 20:16

## 2024-10-18 ASSESSMENT — PAIN DESCRIPTION - LOCATION: LOCATION: ABDOMEN

## 2024-10-18 ASSESSMENT — PAIN SCALES - GENERAL
PAINLEVEL_OUTOF10: 1
PAINLEVEL_OUTOF10: 0

## 2024-10-18 ASSESSMENT — PAIN DESCRIPTION - DESCRIPTORS: DESCRIPTORS: DISCOMFORT

## 2024-10-18 NOTE — ONCOLOGY
Hematology-Oncology Progress Note    Toni Crook  1940  [id number]  10/18/2024    Follow-up for: met. Colon cancer     [x]        Chart notes since last visit reviewed   [x]        Medications reviewed for allergies and interactions       Case discussed with the following:         []                            []        Nursing Staff                                                                         []        Pathologist                                                                        [x]        FAMILY      Subjective:     Spoke with patient who complains of: pt is alert, awake, tolerating ngt, less distended    Objective:   Patient Vitals for the past 24 hrs:   BP Temp Temp src Pulse Resp SpO2 Weight   10/18/24 0921 (!) 144/83 98.1 °F (36.7 °C) Oral 96 16 95 % --   10/18/24 0636 -- -- -- -- -- -- 76.5 kg (168 lb 9.6 oz)   10/17/24 1952 119/69 97.7 °F (36.5 °C) Oral 87 12 96 % --   10/17/24 1530 138/81 98.1 °F (36.7 °C) Oral 75 16 94 % --       REVIEW OF SYSTEMS:    Constitutional: negative fever, negative chills, negative weight loss  Eyes:   negative visual changes  ENT:   negative sore throat, tongue or lip swelling  Respiratory:  negative cough, negative dyspnea  Cards:  negative for chest pain, palpitations, lower extremity edema  GI:   negative for nausea, vomiting, diarrhea, and abdominal pain  Neuro:  negative for headaches, dizziness, vertigo  []                        Full ROS o/w normal/non contributor    Constitutional:  Patient looks  []        Sick  [x]        Frail  []        Better                                                 []        Depressed    HEENT:  [x]   NC                      ngt in place       Eyes: []   Normal               [x]    Icteric  Oropharynx: []    Normal                  []  Thrush               []   Dry  Mucositis: []    None                 Grade: []        I  []        II  []        III  []        IV  Neck:   [x]   Supple                  []   Rigid               JVD:    []   ABSENT       []   PRESENT  Lymphadenopathy:   []   None Noted            []   PRESENT    Chest:  [x]   Clear               []    Rhonchi                      Dec'd @     []  Right Base           []   Left Base    CV:             []   Regular              []  Irregular               []   Tachy                []   Murmur  Abdominal:   [x]    Soft              [x]   NON-tender               []   Tender      BS:    []   ABSENT                   []   PRESENT  Liver:     []  NON-palp                  []   EDGE- palp  Spleen: []   NON-palp                   []  EDGE - palp  Mass:   []   ABSENT                          []  PRESENT  Extr:    []  Lymphedema             []   Cyanosis      []  Clubbing  Edema:     [x]   NONE       []   PRESENT  Skin:  Intact []           Purpura []        Rash: []   ABSENT       []  PRESENT  Neuro:  []        Normal  []        Confused      Available labs reviewed:  Labs:  No results found for this or any previous visit (from the past 24 hour(s)).    Available Xrays reviewed:    Chemotherapy monitored and toxicities assessed:    Assessment and Plan   Met colon cancer to liver.the patient is  s/p r hemicoloectomy. Is doing well in his post-op care.  We will be available for concerns over the weekend,  I will see him again on Monday  Yoel Matthews MD

## 2024-10-18 NOTE — PROGRESS NOTES
He is still distended today. His white blood cell count has been elevated. He has not vomited recently, but he is coughing up phlegm. He has not passed or had a bowel movement. He is not complaining of any abdominal pain.  Drains are clear. Incision is clean.   Assessment and plan   CT scan today. Likely will need an NG tube.

## 2024-10-18 NOTE — PLAN OF CARE
Problem: Pain  Goal: Verbalizes/displays adequate comfort level or baseline comfort level  10/18/2024 0209 by Shae Barboza, RN  Outcome: Progressing  10/17/2024 1233 by Mandy Amezcua, RN  Outcome: Adequate for Discharge     Problem: Safety - Adult  Goal: Free from fall injury  10/18/2024 0209 by Shae Barboza, RN  Outcome: Progressing  10/17/2024 1233 by Mandy Amezcua, RN  Outcome: Adequate for Discharge

## 2024-10-18 NOTE — PROGRESS NOTES
Physician Progress Note      PATIENT:               SIGRID CONNELL  CSN #:                  [id number]  :                       1940  ADMIT DATE:       10/14/2024 8:03 AM  DISCH DATE:  RESPONDING  PROVIDER #:        Clay Bateman MD          QUERY TEXT:    Pt admitted for  and  s/p colectomy.    Pt noted to have Na  130  on  admit    and  subsequently   130  and  131. If possible, please document in the   progress notes and discharge summary if you are evaluating and / or treating   any of the following:    The medical record reflects the following:  Risk Factors: age;     post op  colectomy;  cecal  cancer;  Clinical Indicators:   Na   levels    130;   130  and  131.  Treatment: NS  IVF   at  100 cc/hr  ;  BMP  daily;    Thank you  Options provided:  -- Hyponatremia  -- Pseudohyponatremia  -- Clinically insignificant low serum sodium  -- Other - I will add my own diagnosis  -- Disagree - Not applicable / Not valid  -- Disagree - Clinically unable to determine / Unknown  -- Refer to Clinical Documentation Reviewer    PROVIDER RESPONSE TEXT:    This patient has hyponatremia.    Query created by: Marjorie Downs on 10/18/2024 9:09 AM      Electronically signed by:  Clay Bateman MD 10/18/2024 11:11 AM

## 2024-10-18 NOTE — PLAN OF CARE
Problem: Physical Therapy - Adult  Goal: By Discharge: Performs mobility at highest level of function for planned discharge setting.  See evaluation for individualized goals.  Description: FUNCTIONAL STATUS PRIOR TO ADMISSION: Patient was modified independent and active.    HOME SUPPORT PRIOR TO ADMISSION: The patient lived with spouse but did not require assistance.    Physical Therapy Goals  Initiated 10/15/2024  1.  Patient will move from supine to sit and sit to supine in bed with minimal assistance within 7 day(s).    2.  Patient will perform sit to stand with minimal assistance within 7 day(s).  3.  Patient will transfer from bed to chair and chair to bed with minimal assistance using the least restrictive device within 7 day(s).  4.  Patient will ambulate with moderate assistance for 10 feet with the least restrictive device within 7 day(s).     Outcome: Progressing   PHYSICAL THERAPY TREATMENT    Patient: Toni Crook (84 y.o. 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 He demonstrates the ability to ambulate increased distance with use of RW. Patient ambulates with good gait speed. No overt LOB or path deviations are noted. Patient is able to stand without support and void in urinal.          PLAN:  Patient continues to benefit from skilled intervention to address the above impairments.  Continue treatment per established plan of care.        Recommendation for discharge: (in order for the patient to meet his/her long term goals):   Intermittent physical therapy up to 2-3x/week in previous living setting with additional support needed for ADLs    Other factors to consider for discharge:

## 2024-10-19 PROCEDURE — 1200000000 HC SEMI PRIVATE

## 2024-10-19 PROCEDURE — 6370000000 HC RX 637 (ALT 250 FOR IP): Performed by: COLON & RECTAL SURGERY

## 2024-10-19 PROCEDURE — 6360000002 HC RX W HCPCS: Performed by: COLON & RECTAL SURGERY

## 2024-10-19 RX ADMIN — LEVOFLOXACIN 250 MG: 5 INJECTION, SOLUTION INTRAVENOUS at 16:15

## 2024-10-19 RX ADMIN — METRONIDAZOLE 500 MG: 500 INJECTION, SOLUTION INTRAVENOUS at 19:47

## 2024-10-19 RX ADMIN — ENOXAPARIN SODIUM 40 MG: 100 INJECTION SUBCUTANEOUS at 09:43

## 2024-10-19 RX ADMIN — PHENOL 1 SPRAY: 1.4 SPRAY ORAL at 12:34

## 2024-10-19 RX ADMIN — METRONIDAZOLE 500 MG: 500 INJECTION, SOLUTION INTRAVENOUS at 03:01

## 2024-10-19 RX ADMIN — LEVOFLOXACIN 500 MG: 5 INJECTION, SOLUTION INTRAVENOUS at 15:04

## 2024-10-19 RX ADMIN — METRONIDAZOLE 500 MG: 500 INJECTION, SOLUTION INTRAVENOUS at 11:52

## 2024-10-19 ASSESSMENT — PAIN SCALES - GENERAL: PAINLEVEL_OUTOF10: 3

## 2024-10-19 ASSESSMENT — PAIN DESCRIPTION - LOCATION: LOCATION: THROAT

## 2024-10-19 NOTE — PROGRESS NOTES
Complained of productive cough and dryness of mouth with difficulty of breathing. Offered mouth spray but patient decline, some ice chips were given.  Encouraged use of IS, tolerated well to 1000 ml.   Vital signs and oxygen saturations are stable on room air.

## 2024-10-20 LAB
ANION GAP SERPL CALC-SCNC: 5 MMOL/L (ref 2–12)
BASOPHILS # BLD: 0 K/UL (ref 0–0.1)
BASOPHILS NFR BLD: 0 % (ref 0–1)
BUN SERPL-MCNC: 18 MG/DL (ref 6–20)
BUN/CREAT SERPL: 21 (ref 12–20)
CALCIUM SERPL-MCNC: 8.5 MG/DL (ref 8.5–10.1)
CHLORIDE SERPL-SCNC: 105 MMOL/L (ref 97–108)
CO2 SERPL-SCNC: 28 MMOL/L (ref 21–32)
CREAT SERPL-MCNC: 0.87 MG/DL (ref 0.7–1.3)
DIFFERENTIAL METHOD BLD: ABNORMAL
EOSINOPHIL # BLD: 0.1 K/UL (ref 0–0.4)
EOSINOPHIL NFR BLD: 1 % (ref 0–7)
ERYTHROCYTE [DISTWIDTH] IN BLOOD BY AUTOMATED COUNT: 17.4 % (ref 11.5–14.5)
GLUCOSE SERPL-MCNC: 84 MG/DL (ref 65–100)
HCT VFR BLD AUTO: 29.2 % (ref 36.6–50.3)
HGB BLD-MCNC: 8.7 G/DL (ref 12.1–17)
IMM GRANULOCYTES # BLD AUTO: 0.1 K/UL (ref 0–0.04)
IMM GRANULOCYTES NFR BLD AUTO: 1 % (ref 0–0.5)
LYMPHOCYTES # BLD: 0.7 K/UL (ref 0.8–3.5)
LYMPHOCYTES NFR BLD: 6 % (ref 12–49)
MCH RBC QN AUTO: 23.1 PG (ref 26–34)
MCHC RBC AUTO-ENTMCNC: 29.8 G/DL (ref 30–36.5)
MCV RBC AUTO: 77.7 FL (ref 80–99)
MONOCYTES # BLD: 1.1 K/UL (ref 0–1)
MONOCYTES NFR BLD: 9 % (ref 5–13)
NEUTS SEG # BLD: 10.3 K/UL (ref 1.8–8)
NEUTS SEG NFR BLD: 83 % (ref 32–75)
NRBC # BLD: 0 K/UL (ref 0–0.01)
NRBC BLD-RTO: 0 PER 100 WBC
PLATELET # BLD AUTO: 296 K/UL (ref 150–400)
PLATELET COMMENT: ABNORMAL
PMV BLD AUTO: 9.1 FL (ref 8.9–12.9)
POTASSIUM SERPL-SCNC: 3.6 MMOL/L (ref 3.5–5.1)
RBC # BLD AUTO: 3.76 M/UL (ref 4.1–5.7)
RBC MORPH BLD: ABNORMAL
SODIUM SERPL-SCNC: 138 MMOL/L (ref 136–145)
WBC # BLD AUTO: 12.3 K/UL (ref 4.1–11.1)

## 2024-10-20 PROCEDURE — 80048 BASIC METABOLIC PNL TOTAL CA: CPT

## 2024-10-20 PROCEDURE — 1200000000 HC SEMI PRIVATE

## 2024-10-20 PROCEDURE — 85025 COMPLETE CBC W/AUTO DIFF WBC: CPT

## 2024-10-20 PROCEDURE — 6360000002 HC RX W HCPCS: Performed by: COLON & RECTAL SURGERY

## 2024-10-20 PROCEDURE — 2580000003 HC RX 258: Performed by: COLON & RECTAL SURGERY

## 2024-10-20 RX ADMIN — LEVOFLOXACIN 250 MG: 5 INJECTION, SOLUTION INTRAVENOUS at 15:23

## 2024-10-20 RX ADMIN — METRONIDAZOLE 500 MG: 500 INJECTION, SOLUTION INTRAVENOUS at 18:03

## 2024-10-20 RX ADMIN — SODIUM CHLORIDE: 9 INJECTION, SOLUTION INTRAVENOUS at 21:54

## 2024-10-20 RX ADMIN — SODIUM CHLORIDE: 9 INJECTION, SOLUTION INTRAVENOUS at 08:59

## 2024-10-20 RX ADMIN — METRONIDAZOLE 500 MG: 500 INJECTION, SOLUTION INTRAVENOUS at 10:16

## 2024-10-20 RX ADMIN — ENOXAPARIN SODIUM 40 MG: 100 INJECTION SUBCUTANEOUS at 08:34

## 2024-10-20 RX ADMIN — SODIUM CHLORIDE, PRESERVATIVE FREE 10 ML: 5 INJECTION INTRAVENOUS at 08:35

## 2024-10-20 RX ADMIN — LEVOFLOXACIN 500 MG: 5 INJECTION, SOLUTION INTRAVENOUS at 14:03

## 2024-10-20 RX ADMIN — METRONIDAZOLE 500 MG: 500 INJECTION, SOLUTION INTRAVENOUS at 03:13

## 2024-10-20 NOTE — PROGRESS NOTES
0930- Patient is alert and oriented x4. Denies any pain or discomfort at this time. LIZBET drain x2 to bulb suction patent and intact w/ clear yellow drainage noted. Midline ABD dressing clean dry and intact. Right nare NGT clamped per surgery. Patient educated on informing staff of any pain, bloating, nausea, vomiting. Ambulatory with walker to the BR- steady gait noted. Able to have small BM-loose brown/green.  PIV x2- NS infusing at 100cc/hr.     1130- Patient denies any pain or discomfort to abdomen. Able to walk halls, walked around the unit x2.5 laps with no distress    1500-Patient ambulated through the halls with walker, stable gait.     1530- Denies any abdominal pain or discomfort. Residuals checked= 50cc noted.

## 2024-10-20 NOTE — PROGRESS NOTES
CRS  Pt with flatus, no stool  Flowsheet, labs reviewed  Abd: soft, nt, some dt     Inc: c/d/I     Drains: minimal, thin    Plan:  NGT clamping trial today. Check residuals q6hrs. If residuals >150mL/6hrs then connect back to low cont wall suction. Otherwise, if residuals <150mL /6 hours then reclamp and check residuals again in 6 hrs.

## 2024-10-21 LAB
ANION GAP SERPL CALC-SCNC: 5 MMOL/L (ref 2–12)
BASOPHILS # BLD: 0 K/UL (ref 0–0.1)
BASOPHILS NFR BLD: 0 % (ref 0–1)
BUN SERPL-MCNC: 17 MG/DL (ref 6–20)
BUN/CREAT SERPL: 22 (ref 12–20)
CALCIUM SERPL-MCNC: 8.3 MG/DL (ref 8.5–10.1)
CHLORIDE SERPL-SCNC: 108 MMOL/L (ref 97–108)
CO2 SERPL-SCNC: 27 MMOL/L (ref 21–32)
CREAT SERPL-MCNC: 0.79 MG/DL (ref 0.7–1.3)
DIFFERENTIAL METHOD BLD: ABNORMAL
EOSINOPHIL # BLD: 0.1 K/UL (ref 0–0.4)
EOSINOPHIL NFR BLD: 1 % (ref 0–7)
ERYTHROCYTE [DISTWIDTH] IN BLOOD BY AUTOMATED COUNT: 17.8 % (ref 11.5–14.5)
GLUCOSE SERPL-MCNC: 89 MG/DL (ref 65–100)
HCT VFR BLD AUTO: 27.8 % (ref 36.6–50.3)
HGB BLD-MCNC: 8.3 G/DL (ref 12.1–17)
IMM GRANULOCYTES # BLD AUTO: 0.1 K/UL (ref 0–0.04)
IMM GRANULOCYTES NFR BLD AUTO: 1 % (ref 0–0.5)
LYMPHOCYTES # BLD: 0.7 K/UL (ref 0.8–3.5)
LYMPHOCYTES NFR BLD: 8 % (ref 12–49)
MAGNESIUM SERPL-MCNC: 2 MG/DL (ref 1.6–2.4)
MCH RBC QN AUTO: 23.3 PG (ref 26–34)
MCHC RBC AUTO-ENTMCNC: 29.9 G/DL (ref 30–36.5)
MCV RBC AUTO: 78.1 FL (ref 80–99)
MONOCYTES # BLD: 0.8 K/UL (ref 0–1)
MONOCYTES NFR BLD: 9 % (ref 5–13)
NEUTS SEG # BLD: 7.3 K/UL (ref 1.8–8)
NEUTS SEG NFR BLD: 81 % (ref 32–75)
NRBC # BLD: 0 K/UL (ref 0–0.01)
NRBC BLD-RTO: 0 PER 100 WBC
PHOSPHATE SERPL-MCNC: 2.7 MG/DL (ref 2.6–4.7)
PLATELET # BLD AUTO: 252 K/UL (ref 150–400)
PMV BLD AUTO: 9.3 FL (ref 8.9–12.9)
POTASSIUM SERPL-SCNC: 3.7 MMOL/L (ref 3.5–5.1)
RBC # BLD AUTO: 3.56 M/UL (ref 4.1–5.7)
SODIUM SERPL-SCNC: 140 MMOL/L (ref 136–145)
WBC # BLD AUTO: 9 K/UL (ref 4.1–11.1)

## 2024-10-21 PROCEDURE — 83735 ASSAY OF MAGNESIUM: CPT

## 2024-10-21 PROCEDURE — 85025 COMPLETE CBC W/AUTO DIFF WBC: CPT

## 2024-10-21 PROCEDURE — 6360000002 HC RX W HCPCS: Performed by: COLON & RECTAL SURGERY

## 2024-10-21 PROCEDURE — 6370000000 HC RX 637 (ALT 250 FOR IP): Performed by: COLON & RECTAL SURGERY

## 2024-10-21 PROCEDURE — 1200000000 HC SEMI PRIVATE

## 2024-10-21 PROCEDURE — 2580000003 HC RX 258: Performed by: COLON & RECTAL SURGERY

## 2024-10-21 PROCEDURE — 84100 ASSAY OF PHOSPHORUS: CPT

## 2024-10-21 PROCEDURE — 80048 BASIC METABOLIC PNL TOTAL CA: CPT

## 2024-10-21 RX ADMIN — ACETAMINOPHEN 1000 MG: 500 TABLET ORAL at 21:15

## 2024-10-21 RX ADMIN — METRONIDAZOLE 500 MG: 500 INJECTION, SOLUTION INTRAVENOUS at 03:10

## 2024-10-21 RX ADMIN — SODIUM CHLORIDE: 9 INJECTION, SOLUTION INTRAVENOUS at 09:26

## 2024-10-21 RX ADMIN — ENOXAPARIN SODIUM 40 MG: 100 INJECTION SUBCUTANEOUS at 09:11

## 2024-10-21 RX ADMIN — METRONIDAZOLE 500 MG: 500 INJECTION, SOLUTION INTRAVENOUS at 11:53

## 2024-10-21 RX ADMIN — LEVOFLOXACIN 500 MG: 5 INJECTION, SOLUTION INTRAVENOUS at 15:47

## 2024-10-21 RX ADMIN — LEVOFLOXACIN 250 MG: 5 INJECTION, SOLUTION INTRAVENOUS at 17:09

## 2024-10-21 RX ADMIN — METRONIDAZOLE 500 MG: 500 INJECTION, SOLUTION INTRAVENOUS at 19:11

## 2024-10-21 RX ADMIN — SODIUM CHLORIDE, PRESERVATIVE FREE 10 ML: 5 INJECTION INTRAVENOUS at 09:26

## 2024-10-21 ASSESSMENT — PAIN SCALES - GENERAL: PAINLEVEL_OUTOF10: 0

## 2024-10-21 NOTE — PROGRESS NOTES
Patient doing well. He is having bowel movements. Not much out of the NG tube. His LIZBET drains are serous.   Assessment and plan   Remove both LIZBET drains. Remove the NG tube. Trial of clear liquids.

## 2024-10-21 NOTE — ONCOLOGY
Hematology-Oncology Progress Note    Toni Crook  1940  [id number]  10/21/2024    Follow-up for: met. Colon cancer     [x]        Chart notes since last visit reviewed   [x]        Medications reviewed for allergies and interactions       Case discussed with the following:         []                            []        Nursing Staff                                                                         []        Pathologist                                                                        [x]        FAMILY      Subjective:     Spoke with patient who complains of: pt is alert, awake, tolerating ngt, less distended    Objective:   Patient Vitals for the past 24 hrs:   BP Temp Temp src Pulse Resp SpO2 Weight   10/21/24 0800 139/75 97.3 °F (36.3 °C) Oral 89 18 95 % --   10/21/24 0631 -- -- -- -- -- -- 67.3 kg (148 lb 5.9 oz)   10/20/24 1919 139/82 97.3 °F (36.3 °C) Oral 79 18 96 % --   10/20/24 1700 121/86 97.7 °F (36.5 °C) Oral 88 18 98 % --   10/20/24 1604 118/68 97.7 °F (36.5 °C) -- 96 -- 92 % --       REVIEW OF SYSTEMS:    Constitutional: negative fever, negative chills, negative weight loss  Eyes:   negative visual changes  ENT:   negative sore throat, tongue or lip swelling  Respiratory:  negative cough, negative dyspnea  Cards:  negative for chest pain, palpitations, lower extremity edema  GI:   negative for nausea, vomiting, diarrhea, and abdominal pain  Neuro:  negative for headaches, dizziness, vertigo  []                        Full ROS o/w normal/non contributor    Constitutional:  Patient looks  []        Sick  [x]        Frail  []        Better                                                 []        Depressed    HEENT:  [x]   NC                      ngt in place       Eyes: []   Normal               [x]    Icteric  Oropharynx: []    Normal                  []  Thrush               []   Dry  Mucositis: []    None                 Grade: []        I  []        II  []        III

## 2024-10-21 NOTE — PROGRESS NOTES
Spiritual Health History and Assessment/Progress Note  [facility]    (P) Initial Encounter,  ,  ,      Name: Toni Crook MRN: [id number]    Age: 84 y.o.     Sex: male   Language: English   Quaker: None   Cecal cancer (HCC)     Date: 10/21/2024            Total Time Calculated: (P) 14 min              Spiritual Assessment began in [facility] 5E1 SURGICAL UNIT        Referral/Consult From: (P) Rounding   Encounter Overview/Reason: (P) Initial Encounter  Service Provided For: (P) Patient and family together    Jeaneth, Belief, Meaning:   Patient has beliefs or practices that help with coping during difficult times  Family/Friends have beliefs or practices that help with coping during difficult times      Importance and Influence:  Patient unable to assess at this time  Family/Friends Other: unable to assess at this time    Community:  Patient feels well-supported. Support system includes: Spouse/Partner  Family/Friends feel well-supported. Support system includes: Unknown    Assessment and Plan of Care:     Patient Interventions include: Affirmed coping skills/support systems  Family/Friends Interventions include: Affirmed coping skills/support systems    Patient Plan of Care: Spiritual Care available upon further referral  Family/Friends Plan of Care: Spiritual Care available upon further referral    Electronically signed by Chaplain Stella Resident on 10/21/2024 at 1:57 PM

## 2024-10-22 PROCEDURE — 6360000002 HC RX W HCPCS: Performed by: COLON & RECTAL SURGERY

## 2024-10-22 PROCEDURE — 97116 GAIT TRAINING THERAPY: CPT

## 2024-10-22 PROCEDURE — 2580000003 HC RX 258: Performed by: COLON & RECTAL SURGERY

## 2024-10-22 PROCEDURE — 97535 SELF CARE MNGMENT TRAINING: CPT

## 2024-10-22 PROCEDURE — 1200000000 HC SEMI PRIVATE

## 2024-10-22 PROCEDURE — 51798 US URINE CAPACITY MEASURE: CPT

## 2024-10-22 RX ADMIN — METRONIDAZOLE 500 MG: 500 INJECTION, SOLUTION INTRAVENOUS at 03:46

## 2024-10-22 RX ADMIN — ENOXAPARIN SODIUM 40 MG: 100 INJECTION SUBCUTANEOUS at 09:37

## 2024-10-22 RX ADMIN — METRONIDAZOLE 500 MG: 500 INJECTION, SOLUTION INTRAVENOUS at 19:26

## 2024-10-22 RX ADMIN — SODIUM CHLORIDE, PRESERVATIVE FREE 10 ML: 5 INJECTION INTRAVENOUS at 10:09

## 2024-10-22 RX ADMIN — LEVOFLOXACIN 500 MG: 5 INJECTION, SOLUTION INTRAVENOUS at 14:28

## 2024-10-22 RX ADMIN — SODIUM CHLORIDE, PRESERVATIVE FREE 20 ML: 5 INJECTION INTRAVENOUS at 22:42

## 2024-10-22 RX ADMIN — SODIUM CHLORIDE, PRESERVATIVE FREE 10 ML: 5 INJECTION INTRAVENOUS at 17:18

## 2024-10-22 RX ADMIN — LEVOFLOXACIN 250 MG: 5 INJECTION, SOLUTION INTRAVENOUS at 16:03

## 2024-10-22 RX ADMIN — METRONIDAZOLE 500 MG: 500 INJECTION, SOLUTION INTRAVENOUS at 11:09

## 2024-10-22 ASSESSMENT — PAIN SCALES - GENERAL
PAINLEVEL_OUTOF10: 0

## 2024-10-22 NOTE — PLAN OF CARE
Problem: Physical Therapy - Adult  Goal: By Discharge: Performs mobility at highest level of function for planned discharge setting.  See evaluation for individualized goals.  Description: FUNCTIONAL STATUS PRIOR TO ADMISSION: Patient was modified independent and active.    HOME SUPPORT PRIOR TO ADMISSION: The patient lived with spouse but did not require assistance.    Physical Therapy Goals  Reassessment 10/22/2024  1.  Patient will move from supine to sit and sit to supine in bed with modified independence within 7 day(s).    2.  Patient will perform sit to stand with modified independence within 7 day(s).  3.  Patient will transfer from bed to chair and chair to bed with modified independence using the least restrictive device within 7 day(s).  4.  Patient will ambulate with modified independence for 500 feet with the least restrictive device within 7 day(s).   5.  Patient will ascend/descend full flight of stairs (computer room upstairs) with modified independence within 7 days.         Initiated 10/15/2024  1.  Patient will move from supine to sit and sit to supine in bed with minimal assistance within 7 day(s).    2.  Patient will perform sit to stand with minimal assistance within 7 day(s).  3.  Patient will transfer from bed to chair and chair to bed with minimal assistance using the least restrictive device within 7 day(s).  4.  Patient will ambulate with moderate assistance for 10 feet with the least restrictive device within 7 day(s).     10/22/2024 1549 by Natalie Brock, PT  Outcome: Progressing   PHYSICAL THERAPY TREATMENT: WEEKLY REASSESSMENT    Patient: Toni Crook (84 y.o. male)  Date: 10/22/2024  Primary Diagnosis: Malignant neoplasm of colon, unspecified part of colon (HCC) [C18.9]  Cecal cancer (HCC) [C18.0]  Procedure(s) (LRB):  RIGHT COLECTOMY (E R A S) (N/A) 8 Days Post-Op   Precautions:                  fall      ASSESSMENT :  Patient continues to benefit from skilled PT services and is

## 2024-10-22 NOTE — ONCOLOGY
Hematology-Oncology Progress Note    Toni Crook  1940  [id number]  10/22/2024    Follow-up for: met. Colon cancer     [x]        Chart notes since last visit reviewed   [x]        Medications reviewed for allergies and interactions       Case discussed with the following:         []                            []        Nursing Staff                                                                         []        Pathologist                                                                        []        FAMILY      Subjective:     Spoke with patient who complains of: pt is alert, awake, tolerating diet nicely    Objective:   Patient Vitals for the past 24 hrs:   BP Temp Temp src Pulse Resp SpO2 Height Weight   10/22/24 1150 -- -- -- -- -- -- 1.778 m (5' 10\") --   10/22/24 0730 127/68 97.9 °F (36.6 °C) Oral 61 16 96 % -- --   10/22/24 0355 -- -- -- -- -- -- -- 75.3 kg (166 lb)   10/21/24 1930 133/72 98.1 °F (36.7 °C) Oral 84 15 97 % -- --       REVIEW OF SYSTEMS:    Constitutional: negative fever, negative chills, negative weight loss  Eyes:   negative visual changes  ENT:   negative sore throat, tongue or lip swelling  Respiratory:  negative cough, negative dyspnea  Cards:  negative for chest pain, palpitations, lower extremity edema  GI:   negative for nausea, vomiting, diarrhea, and abdominal pain  Neuro:  negative for headaches, dizziness, vertigo  []                        Full ROS o/w normal/non contributor    Constitutional:  Patient looks  []        Sick  [x]        Frail  []        Better                                                 []        Depressed    HEENT:  [x]   NC                      ngt in place       Eyes: []   Normal               [x]    Icteric  Oropharynx: []    Normal                  []  Thrush               []   Dry  Mucositis: []    None                 Grade: []        I  []        II  []        III  []        IV  Neck:   [x]   Supple                  []  Rigid

## 2024-10-22 NOTE — PROGRESS NOTES
No complaints. Pain is well-controlled. He is not passing any gas yet. He had a couple of bowel movements yesterday. He has no nausea.  Abdomen is soft. He has bowel sounds. Drains have been removed.   Assessment and plan   Advanced once he starts passing gas.  Will check in later.

## 2024-10-22 NOTE — PLAN OF CARE
Problem: Occupational Therapy - Adult  Goal: By Discharge: Performs self-care activities at highest level of function for planned discharge setting.  See evaluation for individualized goals.  Description: FUNCTIONAL STATUS PRIOR TO ADMISSION:  Patient was ambulatory using no DME   , ADL Assistance: Independent,  ,  ,  ,  ,  , Homemaking Assistance: Independent, Ambulation Assistance: Independent, Transfer Assistance: Independent,       HOME SUPPORT: Patient lived with spouse but didn't require assistance.    Occupational Therapy Goals:  Weekly Re-Assessment, goals upgraded below  Initiated 10/15/2024  1.  Patient will perform grooming with Minimal Assist in unsupported sitting EOB within 7 day(s). UPGRADED to Mod I at the sink 10/22  2.  Patient will perform bathing with Moderate Assist within 7 day(s). UPGRADED to Mod I 10/22  3.  Patient will perform lower body dressing with Moderate Assist within 7 day(s). UPGRADED to Mod I 10/22  4.  Patient will perform toilet transfers with Moderate Assist and Assist x1 to BSC within 7 day(s). UPGRADED to Mod I on/off toilet in bathroom 10/22  5.  Patient will perform all aspects of toileting with Moderate Assist within 7 day(s). UPGRADED to Mod I 10/22  6.  Patient will participate in upper extremity therapeutic exercise/activities with Minimal Assist for 5 minutes within 7 day(s). UPGRADED to Mod I 10/22  7.  Patient will utilize energy conservation techniques during functional activities with verbal cues within 7 day(s). CONTINUE  Outcome: Progressing     OCCUPATIONAL THERAPY TREATMENT: WEEKLY REASSESSMENT    Patient: Toni Crook (84 y.o. male)  Date: 10/22/2024  Primary Diagnosis: Malignant neoplasm of colon, unspecified part of colon (HCC) [C18.9]  Cecal cancer (HCC) [C18.0]  Procedure(s) (LRB):  RIGHT COLECTOMY (E R A S) (N/A) 8 Days Post-Op   Precautions:                  Chart, occupational therapy assessment, plan of care, and goals were  pushing or pulling items instead of carrying them.  11. Educated on fall alert necklaces/bracelets to increase safety  12. If oxygen is recommended wear oxygen as prescribed is imperative (ie. During showering and functional tasks)  13. Avoid going out of the home on very hot days, when pollen count is high or pollution level is high    Patient instructed and indicated understanding home modifications (ie raise height of ADL objects, appropriate chair heights, recliner safety), safety (ie change of floor surfaces, clear pathways), to increase independence and fall prevention with patient & wife acknowledging understanding.       Pain Rating:  None /10   Pain Intervention(s):   nursing notified, rest, and repositioning    Activity Tolerance:   Good, BLE swelling  Please refer to the flowsheet for vital signs taken during this treatment.    After treatment:   Patient left in no apparent distress in bed, Call bell within reach, and Caregiver / family present    COMMUNICATION/EDUCATION:   The patient's plan of care was discussed with: physical therapist and registered nurse    Patient Education  Education Given To: Patient;Family  Education Provided: Role of Therapy;Plan of Care;Precautions;ADL Adaptive Strategies;Transfer Training;Energy Conservation;IADL Safety;Fall Prevention Strategies;Mobility Training;Equipment;Home Exercise Program  Education Method: Demonstration;Verbal;Teach Back  Barriers to Learning: Cognition  Education Outcome: Verbalized understanding;Demonstrated understanding;Continued education needed    Thank you for this referral.  SAL Díaz, OTR/L  Minutes: 25

## 2024-10-22 NOTE — PROGRESS NOTES
Medications:  reviewed      Estimated Daily Nutrient Needs:  Energy Requirements Based On: Kcal/kg  Weight Used for Energy Requirements: Current  Energy (kcal/day): 3524-9995 (28-32 kcals/kg)  Weight Used for Protein Requirements: Current  Protein (g/day): 105 (1.4 gm/kg)  Method Used for Fluid Requirements: 1 ml/kcal  Fluid (ml/day):      Nutrition Related Findings:   Edema: Left upper extremity, Right lower extremity            RLE Edema: +2, Pitting  LLE Edema: +2, Pitting    Recent Labs     10/20/24  0502 10/21/24  0454   GLUCOSE 84 89   BUN 18 17   CREATININE 0.87 0.79    140   K 3.6 3.7    108   CO2 28 27   CALCIUM 8.5 8.3*   PHOS  --  2.7   MG  --  2.0       No results for input(s): \"POCGLU\" in the last 72 hours.    Lab Results   Component Value Date/Time    LABA1C 5.3 10/07/2024 03:44 PM    LABA1C 4.6 02/01/2023 04:47 PM    LABA1C 5.6 05/17/2021 10:52 AM     10/07/2024 03:44 PM       Triglycerides   Date Value Ref Range Status   02/01/2023 71 <150 MG/DL Final     Comment:     Based on NCEP-ATP III:  Triglycerides <150 mg/dL  is considered normal, 150-199 mg/dL  borderline high,  200-499 mg/dL high and  greater than or equal to 500 mg/dL very high.   05/17/2021 72 <150 MG/DL Final     Comment:     Based on NCEP-ATP III:  Triglycerides <150 mg/dL  is considered normal, 150-199  mg/dL  borderline high,  200-499 mg/dL high and  greater than or equal to 500  mg/dL very high.           Last BM: 10/22/24    Wounds:   Wound Type: Surgical Incision      Current Nutrition Therapies:  Diet: Clear liquid  Supplements: none  Meal Intake:   Patient Vitals for the past 168 hrs:   PO Meals Eaten (%)   10/16/24 1907 1 - 25%   10/16/24 1551 26 - 50%   10/16/24 0900 26 - 50%   10/15/24 1835 26 - 50%   10/15/24 1500 1 - 25%     Supplement Intake:  No data found.  Nutrition Support: none      Anthropometric Measures:  Height: 177.8 cm (5' 10\")  Ideal Body Weight (IBW): 166 lbs (75 kg)       Current Body  Weight: 75.3 kg (166 lb 0.1 oz), 100 % IBW. Weight Source: Standing Scale  Current BMI (kg/m2): 23.8        Weight Adjustment For: No Adjustment                 BMI Categories: Normal Weight (BMI 22.0 to 24.9) age over 65    Wt Readings from Last 10 Encounters:   10/22/24 75.3 kg (166 lb)   10/07/24 71.8 kg (158 lb 6.4 oz)   09/23/24 74.3 kg (163 lb 14.4 oz)   06/12/24 77.6 kg (171 lb)   03/28/24 78 kg (172 lb)   12/11/23 79.3 kg (174 lb 12.8 oz)   11/21/23 77.1 kg (170 lb)   11/13/23 77.1 kg (170 lb)   10/31/23 77.1 kg (170 lb)   04/24/23 80.7 kg (178 lb)           Nutrition Diagnosis:   Inadequate oral intake related to altered GI function as evidenced by NPO or clear liquid status due to medical condition    Nutrition Interventions:   Food and/or Nutrient Delivery: Modify Current Diet, Start Oral Nutrition Supplement  Nutrition Education/Counseling: Education completed  Coordination of Nutrition Care: Continue to monitor while inpatient       Goals:     Goals: other (specify)  Specify Other Goals: Advance diet with PO intakes > 75% meals + ONS over next 5-7 days    Nutrition Monitoring and Evaluation:   Behavioral-Environmental Outcomes: None Identified  Food/Nutrient Intake Outcomes: Diet Advancement/Tolerance, Food and Nutrient Intake, Supplement Intake  Physical Signs/Symptoms Outcomes: Biochemical Data, GI Status, Nausea or Vomiting, Nutrition Focused Physical Findings, Weight    Discharge Planning:    Continue Oral Nutrition Supplement     Ange Melendez RD  Available via [employer]

## 2024-10-22 NOTE — PROGRESS NOTES
Dr. Bateman called to check on patient's GI status. RN informed him that patient has passed some flatus yesterday per patient's wife. Patient verbalized that he did have somewhat of a large liquid mixed with soft stool this morning around 09:30 a.m. Dr. Bateman also made aware of patient's bilateral lower extremity swelling. Dr. Bateman stated that we could increase patient's diet order to a Regular diet and the swelling in the legs should resolve in time by urination. RN will change patient's diet to a Regular one.

## 2024-10-23 LAB
BASOPHILS # BLD: 0 K/UL (ref 0–0.1)
BASOPHILS NFR BLD: 0 % (ref 0–1)
COMMENT:: NORMAL
DIFFERENTIAL METHOD BLD: ABNORMAL
EOSINOPHIL # BLD: 0.1 K/UL (ref 0–0.4)
EOSINOPHIL NFR BLD: 2 % (ref 0–7)
ERYTHROCYTE [DISTWIDTH] IN BLOOD BY AUTOMATED COUNT: 18.9 % (ref 11.5–14.5)
HCT VFR BLD AUTO: 27 % (ref 36.6–50.3)
HGB BLD-MCNC: 8.1 G/DL (ref 12.1–17)
IMM GRANULOCYTES # BLD AUTO: 0 K/UL (ref 0–0.04)
IMM GRANULOCYTES NFR BLD AUTO: 1 % (ref 0–0.5)
LYMPHOCYTES # BLD: 0.8 K/UL (ref 0.8–3.5)
LYMPHOCYTES NFR BLD: 14 % (ref 12–49)
MCH RBC QN AUTO: 23.6 PG (ref 26–34)
MCHC RBC AUTO-ENTMCNC: 30 G/DL (ref 30–36.5)
MCV RBC AUTO: 78.7 FL (ref 80–99)
MONOCYTES # BLD: 0.8 K/UL (ref 0–1)
MONOCYTES NFR BLD: 12 % (ref 5–13)
NEUTS SEG # BLD: 4.4 K/UL (ref 1.8–8)
NEUTS SEG NFR BLD: 71 % (ref 32–75)
NRBC # BLD: 0 K/UL (ref 0–0.01)
NRBC BLD-RTO: 0 PER 100 WBC
PLATELET # BLD AUTO: 200 K/UL (ref 150–400)
PMV BLD AUTO: 9 FL (ref 8.9–12.9)
RBC # BLD AUTO: 3.43 M/UL (ref 4.1–5.7)
SPECIMEN HOLD: NORMAL
WBC # BLD AUTO: 6.1 K/UL (ref 4.1–11.1)

## 2024-10-23 PROCEDURE — 97116 GAIT TRAINING THERAPY: CPT

## 2024-10-23 PROCEDURE — 85025 COMPLETE CBC W/AUTO DIFF WBC: CPT

## 2024-10-23 PROCEDURE — 6360000002 HC RX W HCPCS: Performed by: COLON & RECTAL SURGERY

## 2024-10-23 PROCEDURE — 6370000000 HC RX 637 (ALT 250 FOR IP): Performed by: COLON & RECTAL SURGERY

## 2024-10-23 PROCEDURE — 2580000003 HC RX 258: Performed by: COLON & RECTAL SURGERY

## 2024-10-23 PROCEDURE — 97530 THERAPEUTIC ACTIVITIES: CPT

## 2024-10-23 PROCEDURE — 1200000000 HC SEMI PRIVATE

## 2024-10-23 PROCEDURE — 92610 EVALUATE SWALLOWING FUNCTION: CPT

## 2024-10-23 RX ORDER — FUROSEMIDE 20 MG/1
20 TABLET ORAL DAILY
Status: DISCONTINUED | OUTPATIENT
Start: 2024-10-23 | End: 2024-10-24 | Stop reason: HOSPADM

## 2024-10-23 RX ADMIN — ENOXAPARIN SODIUM 40 MG: 100 INJECTION SUBCUTANEOUS at 08:44

## 2024-10-23 RX ADMIN — SODIUM CHLORIDE: 9 INJECTION, SOLUTION INTRAVENOUS at 02:33

## 2024-10-23 RX ADMIN — METRONIDAZOLE 500 MG: 500 INJECTION, SOLUTION INTRAVENOUS at 02:33

## 2024-10-23 RX ADMIN — FUROSEMIDE 20 MG: 20 TABLET ORAL at 12:54

## 2024-10-23 RX ADMIN — SODIUM CHLORIDE, PRESERVATIVE FREE 10 ML: 5 INJECTION INTRAVENOUS at 08:44

## 2024-10-23 NOTE — PROGRESS NOTES
Discharge orders received for patient to discharge in the morning of 10/24/24. Per Dr. Bateman he does not need to see the patient in the morning and can discharge in the morning.

## 2024-10-23 NOTE — PROGRESS NOTES
No complaints. +flatus and stool  Vitals:    10/23/24 0818   BP: 118/79   Pulse: 82   Resp: 16   Temp: 98.6 °F (37 °C)   SpO2: 94%       Intake/Output Summary (Last 24 hours) at 10/23/2024 1611  Last data filed at 10/22/2024 2235  Gross per 24 hour   Intake 550 ml   Output 730 ml   Net -180 ml     Abd soft  A/p home tomorrow

## 2024-10-23 NOTE — PROGRESS NOTES
Speech LAnguage Pathology EVALUATION/DISCHARGE    Patient: Toni Crook (84 y.o. male)  Date: 10/23/2024  Primary Diagnosis: Malignant neoplasm of colon, unspecified part of colon (HCC) [C18.9]  Cecal cancer (HCC) [C18.0]  Procedure(s) (LRB):  RIGHT COLECTOMY (E R A S) (N/A) 9 Days Post-Op   Precautions: NA    ASSESSMENT:  Patient participated in clinical swallow evaluation consisting of thin liquids and solids during breakfast meal. Oral-motor evaluation revealed reduced natural dentition. No motor speech or voice concerns identified. Patient demonstrated grossly functional oropharyngeal swallow based on bedside assessment. No overt clinical s/s of aspiration were appreciated with any trialed consistency. He reports sensation of esophageal regurgitation of solids after eating. Low suspicion for acute oropharyngeal dysphagia based on medical history and bedside presentation this date. Could consider GI if appropriate per medical course/team decision. Recommend patient remain on a regular texture diet with thin liquids in conjunction with general swallow precautions.     Patient will be discharged from skilled speech-language pathology services at this time.     PLAN :  Recommendations and Planned Interventions:  Diet: Regular and mildly thick liquids  -Oral medications whole with liquids  -Esophageal Precautions:    · Sit fully upright for meals, preferably OOB in chair     · Remain in upright position for at least one hour after meals    · Consider smaller, more frequent meals throughout the day instead of three large meals    Acute SLP Services: No, patient will be discharged from acute skilled speech-language pathology at this time.  Discharge Recommendations: No, additional SLP treatment not indicated at discharge     SUBJECTIVE:   Patient stated, “So where do we go from here?”    OBJECTIVE:     Past Medical History:   Diagnosis Date    Acne rosacea 6/1/2010    Atrial fibrillation (HCC)     Cancer (HCC)  10/2024    COLON    HTN (hypertension) 6/1/2010    Hypercholesteremia 6/1/2010     Past Surgical History:   Procedure Laterality Date    ABLATE L/R ATRIAL FIBRIL W/ISOLATED PULM VEIN N/A 1/8/2020    Ablation Following A-Fib  Addl performed by Hemant Keenan MD at [facility] CARDIAC CATH LAB    COLONOSCOPY N/A 09/23/2024    COLONOSCOPY performed by Sonia Welch MD at [facility] ENDOSCOPY    EP DEVICE PROCEDURE N/A 11/21/2023    Watchman casi closure device performed by Hemant Keenan MD at [facility] CARDIAC CATH LAB    EPHYS EVL TRNSPTL TX ATRIAL FIB ISOLAT PULM VEIN N/A 1/8/2020    ABLATION A-FIB  W COMPLETE EP STUDY performed by Hemant Keenan MD at [facility] CARDIAC CATH LAB    EYE SURGERY Bilateral     CATARACTS    INTRACARD ECHO, THER/DX INTERVENT N/A 1/8/2020    Intracardiac Echocardiogram performed by Hemant Keenan MD at [facility] CARDIAC CATH LAB    INTRACARDIAC ELECTROPHYSIOLOGIC 3D MAPPING N/A 1/8/2020    Ep 3d Mapping performed by Hemant Keenan MD at [facility] CARDIAC CATH LAB    SMALL INTESTINE SURGERY N/A 10/14/2024    RIGHT COLECTOMY (E R A S) performed by Clay Bateman MD at [facility] MAIN OR    UPPER GASTROINTESTINAL ENDOSCOPY N/A 09/23/2024    ESOPHAGOGASTRODUODENOSCOPY performed by Sonia Welch MD at [facility] ENDOSCOPY    WISDOM TOOTH EXTRACTION       Prior Level of Function/Home Situation:   Baseline Diet: Regular texture with thin liquids    Baseline Assessment:  Current Diet: Regular  Current Liquid Diet: Thin  Prior Dysphagia History: Patient reports sensation of regurgitation of solids from esophagus after eating which began several days ago     Cognitive and Communication Status:  Neurologic State: Alert  Orientation Level: Oriented x4  Cognition: Follows commands    Dysphagia:  Oral Assessment:  Labial: No impairment  Dentition: Natural;Limited (Has lower bridge which his wife is to bring to hospital)  Oral Hygiene: Moist;Clean  Lingual: No impairment  Velum: No Impairment  Mandible: No impairment    P.O.

## 2024-10-23 NOTE — DISCHARGE INSTRUCTIONS
Naveen Archer MD, FACS  Martín Paredes MD, FACS  Phuong Alvarez MD, FACS  Tee Rodríguez MD, FACS  MD John Kim MD J.J. Coury, MD      Discharge Instructions for [facility] Colorectal Surgery Patients       Do not lift any objects weighing more than 10 pounds for 4 weeks.    Do not do any housework including vacuuming, scrubbing or yardwork for 4 weeks.    Do not drive for two weeks or while taking sedating medications.    You may walk as desired and go up and down stairs as needed.    You may shower.  Do not take tub baths, swim or use hot tubs for 4 weeks.    Leave steri-strips on incision. They will fall off on their own.   You may have dermabond on your incisions which is surgical glue.   This will dissolve over time.  Do not scrub around incisions.    Follow low-fiber diet for 2 weeks. (See handbook for additional details).     Drink nutritional supplements 2 times per day until your diet and appetite are back to normal. Diabetic patients should drink one-half bottle 4 times daily.     Multiple bowel movements are normal each day.   Contact your surgeon’s office for any concerns.    Take Acetaminophen 1000mg every 6 hours for 24 hours, then as needed for pain and Ibuprofen 200-400 mg every 8 hours as needed for pain    Follow up with providers as scheduled.    If your surgery involves an ostomy bag, please bring your supplies to the first office visit with your surgeon.     If you experience fever (greater than 100.5), chills, vomiting or redness or drainage at surgical site, please contact your surgeon’s office.    For questions regarding quality or quantity of ostomy output, refer to [facility] handbook or call surgeon’s office.    Please see handbook for additional instructions. If you have further questions, please call your surgeon’s office.

## 2024-10-23 NOTE — PLAN OF CARE
Problem: Physical Therapy - Adult  Goal: By Discharge: Performs mobility at highest level of function for planned discharge setting.  See evaluation for individualized goals.  Description: FUNCTIONAL STATUS PRIOR TO ADMISSION: Patient was modified independent and active.    HOME SUPPORT PRIOR TO ADMISSION: The patient lived with spouse but did not require assistance.    Physical Therapy Goals  Reassessment 10/22/2024  1.  Patient will move from supine to sit and sit to supine in bed with modified independence within 7 day(s).    2.  Patient will perform sit to stand with modified independence within 7 day(s).  3.  Patient will transfer from bed to chair and chair to bed with modified independence using the least restrictive device within 7 day(s).  4.  Patient will ambulate with modified independence for 500 feet with the least restrictive device within 7 day(s).   5.  Patient will ascend/descend full flight of stairs (computer room upstairs) with modified independence within 7 days.         Initiated 10/15/2024  1.  Patient will move from supine to sit and sit to supine in bed with minimal assistance within 7 day(s).    2.  Patient will perform sit to stand with minimal assistance within 7 day(s).  3.  Patient will transfer from bed to chair and chair to bed with minimal assistance using the least restrictive device within 7 day(s).  4.  Patient will ambulate with moderate assistance for 10 feet with the least restrictive device within 7 day(s).     Outcome: Progressing   PHYSICAL THERAPY TREATMENT    Patient: Toni Crook (84 y.o. 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 Pt received resting in

## 2024-10-24 VITALS
HEIGHT: 70 IN | BODY MASS INDEX: 23.59 KG/M2 | SYSTOLIC BLOOD PRESSURE: 118 MMHG | TEMPERATURE: 98.2 F | OXYGEN SATURATION: 94 % | RESPIRATION RATE: 16 BRPM | DIASTOLIC BLOOD PRESSURE: 75 MMHG | HEART RATE: 80 BPM | WEIGHT: 164.8 LBS

## 2024-10-24 PROCEDURE — 6360000002 HC RX W HCPCS: Performed by: COLON & RECTAL SURGERY

## 2024-10-24 PROCEDURE — 2580000003 HC RX 258: Performed by: COLON & RECTAL SURGERY

## 2024-10-24 PROCEDURE — 6370000000 HC RX 637 (ALT 250 FOR IP): Performed by: COLON & RECTAL SURGERY

## 2024-10-24 RX ADMIN — FUROSEMIDE 20 MG: 20 TABLET ORAL at 08:14

## 2024-10-24 RX ADMIN — ENOXAPARIN SODIUM 40 MG: 100 INJECTION SUBCUTANEOUS at 08:14

## 2024-10-24 RX ADMIN — SODIUM CHLORIDE, PRESERVATIVE FREE 10 ML: 5 INJECTION INTRAVENOUS at 08:14

## 2024-10-24 NOTE — CARE COORDINATION
Transition of Care Plan:     RUR:  11% Low   Prior Level of Functioning:  Independent   Disposition:  Home health PT & SN w/ [facility] (p: [phone])  Follow up appointments: PCP, specialist   DME needed: RW - will either obtain from family member or purchase out of pocket   Transportation at discharge: Wife   IM/IMM Medicare/ letter given: 1st IM: 10/15/24  &  2nd IM: 10/24/24  Is patient a [place] and connected with VA? NA  Caregiver Contact: Wife, Lindsay Crook, [phone]   Discharge Caregiver contacted prior to discharge? Yes  Care Conference needed? No  Barriers to discharge: None     CM reviewed chart and noted DC order. Per review, patient is POD#10 for right colectomy. Home health PT/SN in place with [facility] (p: [phone]).  Home health agency made aware of today's DC via [facility]. No further CM needs.     ДМИТРИЙ Ruiz   [phone]  
Transition of Care Plan:     RUR:  13% Low   Prior Level of Functioning:  Independent   Disposition:  Home health PT & SN w/ [facility] (p: [phone])  Follow up appointments: PCP, specialist   DME needed: RW - will either obtain from family member or purchase out of pocket   Transportation at discharge: Wife   IM/IMM Medicare/ letter given: 1st IM: 10/15/24  Is patient a  and connected with VA? NA  Caregiver Contact: Wife, Lindsay Crook, [phone]   Discharge Caregiver contacted prior to discharge? Yes  Care Conference needed? No  Barriers to discharge: Medical     CM reviewed chart. Per review and ID rounds, patient is POD#7 for right colectomy. Plan for NGT clamp trial today, Monday, 10/21. Home health PT/SN in place with [facility] (p: [phone]). Anticipated DC within 48 hours pending medical progress and final recommendations. CM will continue to follow as needed.     ДМИТРИЙ Ruiz   [phone]  
Transition of Care Plan:     RUR:  13% Low   Prior Level of Functioning:  Independent   Disposition:  Home health PT & SN w/ [facility] (p: [phone])  Follow up appointments: PCP, specialist   DME needed: RW - will either obtain from family member or purchase out of pocket   Transportation at discharge: Wife   IM/IMM Medicare/ letter given: 1st IM: 10/15/24  Is patient a  and connected with VA? NA  Caregiver Contact: Wife, Lindsay Crook, [phone]   Discharge Caregiver contacted prior to discharge? Yes  Care Conference needed? No  Barriers to discharge: Medical     11:08AM - CM reviewed chart. Per review and ID rounds, patient is POD#4 for right colectomy. CT scan to take place today, Friday, 10/18; high possibility patient will need an NGT. CM spoke with patient's daughter; daughter in agreement with HH PT/SN; no HH agency preference. [facility] AVS updated.     2:20PM - CM noted RW order. CM met with patient and wife at bedside to discuss. Wife and patient stated that their family member has a RW and will likely be able to utilize their RW; otherwise they would like to save their Medicare DME benefit and purchase out of pocket.  CM will follow as needed.    ДМИТРИЙ Ruiz   [phone]  
Transition of Care Plan:    RUR:  12% Low   Prior Level of Functioning:  Independent   Disposition:  Home health PT & SN pending patient/family choice   Follow up appointments: PCP, specialist   DME needed: None noted at this time   Transportation at discharge: Wife   IM/IMM Medicare/ letter given: 1st IM: 10/15/24  Is patient a  and connected with VA? NA  Caregiver Contact: Wife, Lindsay Crook, [phone]   Discharge Caregiver contacted prior to discharge? Yes  Care Conference needed? No  Barriers to discharge: Medical    CM reviewed chart. Per review and ID rounds, patient is POD#3 for right colectomy. CM noted HH recommendations. CM discussed with patient's wife at bedside. Per wife, she would like to discuss HH recommendations with their daughter. Discharge pending medical progress and final recommendations. CM will follow as needed.    ДМИТРИЙ Ruiz   [phone]      
  DME Ordered? No   Potential Assistance Purchasing Medications No   Patient expects to be discharged to: Lauro Amato, ДМИТРИЙ   [phone]

## 2024-10-24 NOTE — FLOWSHEET NOTE
Ivs removed. Discharge instructions reviewed with patient and family. Patient verbalized understanding and had no further questions/concerns at this time. Patient was wheeled to discharge lot for discharge home with family.

## 2024-10-28 ENCOUNTER — TELEPHONE (OUTPATIENT)
Age: 84
End: 2024-10-28

## 2024-10-28 NOTE — TELEPHONE ENCOUNTER
Elda from [facility] [phone] admit for Home health nurse and skilled nursing and wound care   
Patient has an appointment 11/11/24 with Dr. Schneider. [facility]  
PAST SURGICAL HISTORY:  History of laparoscopic cholecystectomy

## 2024-10-29 NOTE — DISCHARGE SUMMARY
Discharge Summary    Date: 10/29/2024  Patient Name: Toni Crook    YOB: 1940     Age: 84 y.o.    Admit Date: 10/14/2024  Discharge Date: 10/24/2024  Discharge Condition: Good    Admission Diagnosis  Malignant neoplasm of colon, unspecified part of colon (HCC) [C18.9];Cecal cancer (HCC) [C18.0]      Discharge Diagnosis  Principal Problem:    Cecal cancer (HCC)  Resolved Problems:    * No resolved hospital problems. *      Hospital Stay  Narrative of Hospital Course:  He went to the operating room on day of admission. His postop course was complicated by an ileus requiring an ngt. This resolved and his diet was advanced. He was tolerating a diet on day of discharge and moving his bowels.     Consultants:  IP CONSULT TO DIETITIAN  IP CONSULT TO ONCOLOGY  IP CONSULT TO CASE MANAGEMENT  IP CONSULT TO CASE MANAGEMENT    Surgeries/procedures Performed:      Treatments:    Analgesia and Surgery    Acetaminophen    Discharge Plan/Disposition:  Home    Hospital/Incidental Findings Requiring Follow Up:    Patient Instructions:    Diet: Regular Diet    Activity:No Lifting, Driving or Strenuous Excercise  For number of days (if applicable):      Other Instructions:    Provider Follow-Up:   No follow-ups on file.     Significant Diagnostic Studies:    Recent Labs:  Admission on 10/14/2024, Discharged on 10/24/2024  No results displayed because visit has over 200 results.    ------------    Radiology last 7 days:  No results found.     [unfilled]    Discharge Medications    Discharge Medication List as of 10/24/2024  9:59 AM        Discharge Medication List as of 10/24/2024  9:59 AM        Discharge Medication List as of 10/24/2024  9:59 AM    CONTINUE these medications which have NOT CHANGED    furosemide (LASIX) 20 MG tablet  TAKE 1 TABLET BY MOUTH DAILY, Disp-90 tablet, R-1  Normal    lisinopril (PRINIVIL;ZESTRIL) 20 MG tablet  TAKE 1 TABLET BY MOUTH DAILY, Disp-90 tablet, R-1  Normal    aspirin 81 MG EC

## 2024-11-01 NOTE — PROGRESS NOTES
Physician Progress Note      PATIENT:               SIGRID CONNELL  CSN #:                  [id number]  :                       1940  ADMIT DATE:       10/14/2024 8:03 AM  DISCH DATE:        10/24/2024 12:00 PM  RESPONDING  PROVIDER #:        Clay Bateman MD          QUERY TEXT:    Patient admitted for  and  s/p   right  hemicolectomy.  Noted documentation of   INTRAOPERATIVE FINDINGS:  perforated cecal mass.  In order to support the   diagnosis of perforation, please include additional clinical indicators in   your documentation.  Or please document if the diagnosis of perforation has   been ruled out after further study.    The medical record reflects the following:  Risk Factors: per  op  note \" history of cecal perforation and liver   metastasis\"  Clinical Indicators:  The description of procedure on the operative note does   not state perforation. The pathology report does not document perforation  Treatment: right  hemicolectomy    Thank you  very  much  Options provided:  -- NTRAOPERATIVE FINDINGS:  perforated cecal mass present as evidenced by,   Please document evidence.  -- perforated cecal mass was ruled out  -- Other - I will add my own diagnosis  -- Disagree - Not applicable / Not valid  -- Disagree - Clinically unable to determine / Unknown  -- Refer to Clinical Documentation Reviewer    PROVIDER RESPONSE TEXT:    perforated cecal mass is present as evidenced by abscess in left lateral side   wall    Query created by: Marjorie Downs on 10/30/2024 6:46 AM      Electronically signed by:  Clay Bateman MD 2024 7:57 AM

## 2024-11-13 ENCOUNTER — TELEPHONE (OUTPATIENT)
Age: 84
End: 2024-11-13

## 2024-11-13 NOTE — TELEPHONE ENCOUNTER
Gracy from [facility] [phone]  Pt has refused all visit this week. They can not do the medicare wellness

## 2024-11-18 ENCOUNTER — TELEPHONE (OUTPATIENT)
Age: 84
End: 2024-11-18

## 2024-12-03 ENCOUNTER — CLINICAL DOCUMENTATION (OUTPATIENT)
Facility: CLINIC | Age: 84
End: 2024-12-03

## 2024-12-03 NOTE — PROGRESS NOTES
[facility] Skilled home health certification was put on LPN Marylee's desk to process

## 2024-12-04 ENCOUNTER — HOSPITAL ENCOUNTER (OUTPATIENT)
Facility: HOSPITAL | Age: 84
Discharge: HOME | End: 2024-12-07
Attending: INTERNAL MEDICINE
Payer: MEDICARE

## 2024-12-04 ENCOUNTER — CLINICAL DOCUMENTATION (OUTPATIENT)
Facility: CLINIC | Age: 84
End: 2024-12-04

## 2024-12-04 VITALS — BODY MASS INDEX: 21.09 KG/M2 | WEIGHT: 147 LBS

## 2024-12-04 DIAGNOSIS — C78.7 SECONDARY MALIGNANT NEOPLASM OF LIVER (HCC): ICD-10-CM

## 2024-12-04 DIAGNOSIS — C18.2 MALIGNANT NEOPLASM OF ASCENDING COLON (HCC): ICD-10-CM

## 2024-12-04 LAB
GLUCOSE BLD STRIP.AUTO-MCNC: 92 MG/DL (ref 65–117)
SERVICE CMNT-IMP: NORMAL

## 2024-12-04 PROCEDURE — 3430000000 HC RX DIAGNOSTIC RADIOPHARMACEUTICAL: Performed by: INTERNAL MEDICINE

## 2024-12-04 PROCEDURE — 82962 GLUCOSE BLOOD TEST: CPT

## 2024-12-04 PROCEDURE — A9609 HC RX DIAGNOSTIC RADIOPHARMACEUTICAL: HCPCS | Performed by: INTERNAL MEDICINE

## 2024-12-04 PROCEDURE — 78815 PET IMAGE W/CT SKULL-THIGH: CPT

## 2024-12-04 RX ORDER — FLUDEOXYGLUCOSE F-18 500 MCI/ML
10 INJECTION INTRAVENOUS
Status: COMPLETED | OUTPATIENT
Start: 2024-12-04 | End: 2024-12-04

## 2024-12-04 RX ADMIN — FLUDEOXYGLUCOSE F-18 10 MILLICURIE: 500 INJECTION INTRAVENOUS at 14:10

## 2024-12-04 NOTE — PROGRESS NOTES
[facility] Skilled home health certification was completed and faxed to [phone]. Confirmed, scanned.

## 2024-12-12 ENCOUNTER — OFFICE VISIT (OUTPATIENT)
Age: 84
End: 2024-12-12
Payer: MEDICARE

## 2024-12-12 VITALS
BODY MASS INDEX: 22.02 KG/M2 | OXYGEN SATURATION: 97 % | WEIGHT: 153.8 LBS | SYSTOLIC BLOOD PRESSURE: 100 MMHG | DIASTOLIC BLOOD PRESSURE: 76 MMHG | HEART RATE: 64 BPM | HEIGHT: 70 IN

## 2024-12-12 DIAGNOSIS — I48.0 AF (PAROXYSMAL ATRIAL FIBRILLATION) (HCC): Primary | ICD-10-CM

## 2024-12-12 DIAGNOSIS — I10 ESSENTIAL (PRIMARY) HYPERTENSION: ICD-10-CM

## 2024-12-12 DIAGNOSIS — Z98.890 S/P ABLATION OF ATRIAL FIBRILLATION: ICD-10-CM

## 2024-12-12 DIAGNOSIS — C18.2 MALIGNANT NEOPLASM OF ASCENDING COLON (HCC): ICD-10-CM

## 2024-12-12 DIAGNOSIS — Z86.79 S/P ABLATION OF ATRIAL FIBRILLATION: ICD-10-CM

## 2024-12-12 DIAGNOSIS — Z95.818 PRESENCE OF WATCHMAN LEFT ATRIAL APPENDAGE CLOSURE DEVICE: ICD-10-CM

## 2024-12-12 PROCEDURE — 1160F RVW MEDS BY RX/DR IN RCRD: CPT | Performed by: INTERNAL MEDICINE

## 2024-12-12 PROCEDURE — G8420 CALC BMI NORM PARAMETERS: HCPCS | Performed by: INTERNAL MEDICINE

## 2024-12-12 PROCEDURE — 1036F TOBACCO NON-USER: CPT | Performed by: INTERNAL MEDICINE

## 2024-12-12 PROCEDURE — G8484 FLU IMMUNIZE NO ADMIN: HCPCS | Performed by: INTERNAL MEDICINE

## 2024-12-12 PROCEDURE — 99214 OFFICE O/P EST MOD 30 MIN: CPT | Performed by: INTERNAL MEDICINE

## 2024-12-12 PROCEDURE — 1126F AMNT PAIN NOTED NONE PRSNT: CPT | Performed by: INTERNAL MEDICINE

## 2024-12-12 PROCEDURE — G8427 DOCREV CUR MEDS BY ELIG CLIN: HCPCS | Performed by: INTERNAL MEDICINE

## 2024-12-12 PROCEDURE — 1123F ACP DISCUSS/DSCN MKR DOCD: CPT | Performed by: INTERNAL MEDICINE

## 2024-12-12 PROCEDURE — 1159F MED LIST DOCD IN RCRD: CPT | Performed by: INTERNAL MEDICINE

## 2024-12-12 ASSESSMENT — PATIENT HEALTH QUESTIONNAIRE - PHQ9
SUM OF ALL RESPONSES TO PHQ QUESTIONS 1-9: 0
1. LITTLE INTEREST OR PLEASURE IN DOING THINGS: NOT AT ALL
SUM OF ALL RESPONSES TO PHQ QUESTIONS 1-9: 0
SUM OF ALL RESPONSES TO PHQ QUESTIONS 1-9: 0
2. FEELING DOWN, DEPRESSED OR HOPELESS: NOT AT ALL
SUM OF ALL RESPONSES TO PHQ9 QUESTIONS 1 & 2: 0
SUM OF ALL RESPONSES TO PHQ QUESTIONS 1-9: 0

## 2024-12-12 NOTE — PROGRESS NOTES
1. Have you been to the ER, urgent care clinic since your last visit?  Hospitalized since your last visit?Yes When: oct14 Where: [facility] Reason for visit: colon issues    2. Have you seen or consulted any other health care providers outside of the [facility] since your last visit?  Include any pap smears or colon screening. Yes When: Sep 23,2024 Where: [facility] Reason for visit: colonoscopy    
hernia.  ADDITIONAL COMMENTS: N/A    Impression  Right colectomy.  Diffuse fluid-filled distention of small bowel up to the ileocolic anastomosis,  may be secondary to postoperative edema or ileus.  Trace free fluid.  Redemonstrated right hepatic lobe liver lesion.    Electronically signed by ANGELES QUINTERO    MRI Result (most recent):  No results found for this or any previous visit from the past 3650 days.          Current meds:  Current Outpatient Medications   Medication Sig Dispense Refill    furosemide (LASIX) 20 MG tablet TAKE 1 TABLET BY MOUTH DAILY (Patient taking differently: Take 1 tablet by mouth daily) 90 tablet 1    lisinopril (PRINIVIL;ZESTRIL) 20 MG tablet TAKE 1 TABLET BY MOUTH DAILY 90 tablet 1    aspirin 81 MG EC tablet Take 1 tablet by mouth daily 90 tablet 1    iron polysaccharides (NIFEREX) 150 MG capsule Take 1 capsule by mouth Daily with lunch       No current facility-administered medications for this visit.          Hemant Keenan MD  [facility]  [address], [address]  [place], [place] [address] [phone]        CC:Clay Schneider MD

## 2024-12-23 ENCOUNTER — TELEPHONE (OUTPATIENT)
Age: 84
End: 2024-12-23

## 2024-12-23 NOTE — TELEPHONE ENCOUNTER
Received labs dated 12/16/2024.    WBC 5.9  Hgb 12.4  Hct 39.7  Plt 165  Na 141  K 4.5  Glu 77  BUN 14  Cr 0.94  Tbili 0.2  AST 26  ALT 21

## 2024-12-30 RX ORDER — LISINOPRIL 20 MG/1
20 TABLET ORAL DAILY
Qty: 90 TABLET | Refills: 1 | Status: SHIPPED | OUTPATIENT
Start: 2024-12-30

## 2025-01-31 ENCOUNTER — TELEPHONE (OUTPATIENT)
Age: 85
End: 2025-01-31

## 2025-02-19 ENCOUNTER — CLINICAL DOCUMENTATION (OUTPATIENT)
Age: 85
End: 2025-02-19

## 2025-02-19 NOTE — PROGRESS NOTES
Reviewed documentation from recent [facility] office visit 2/11/25    Labs 2/11/25: wbc 12.0, hgb 13.6, plt 87, , K 4.4, cl 97, glu 97, creat 1.10, ca 9.8, tbili 0.4, ast 32, alt 32, GFR 36.3    Imagining results PET scan 12/2024 shows multiple FDG avid lesions in the liver, confirming suspicions that he has metastatic disease.    Plan to continue FOLFOX + Neulasta at same dose/strength, with repeat scans early 3/2025.

## 2025-03-07 ENCOUNTER — TELEPHONE (OUTPATIENT)
Age: 85
End: 2025-03-07

## 2025-03-07 NOTE — TELEPHONE ENCOUNTER
Received labs dated 02/25/2025.    WBC 16  Hgb 13.1  Hct 40  Plt 73  Na 137  K 4.3  Glu 85  BUN 12  Cr 1.11  Tbili 0.4  AST 35  ALT 30

## 2025-03-25 NOTE — PROGRESS NOTES
Received labs dated 03/10/2025.    WBC 10.7  Hgb 12.7  Hct 41  Plt 76  Na 139  K 4.3  Glu 103  BUN 11  Cr 1.01  Tbili 0.4  AST 34  ALT 30

## 2025-04-28 ENCOUNTER — OFFICE VISIT (OUTPATIENT)
Age: 85
End: 2025-04-28
Payer: MEDICARE

## 2025-04-28 VITALS
HEIGHT: 70 IN | BODY MASS INDEX: 23.65 KG/M2 | WEIGHT: 165.2 LBS | TEMPERATURE: 98.1 F | SYSTOLIC BLOOD PRESSURE: 124 MMHG | DIASTOLIC BLOOD PRESSURE: 78 MMHG | HEART RATE: 70 BPM | OXYGEN SATURATION: 96 % | RESPIRATION RATE: 16 BRPM

## 2025-04-28 DIAGNOSIS — C78.7 METASTATIC COLON CANCER TO LIVER (HCC): ICD-10-CM

## 2025-04-28 DIAGNOSIS — K43.2 INCISIONAL HERNIA, WITHOUT OBSTRUCTION OR GANGRENE: Primary | ICD-10-CM

## 2025-04-28 DIAGNOSIS — C18.9 METASTATIC COLON CANCER TO LIVER (HCC): ICD-10-CM

## 2025-04-28 PROCEDURE — 1036F TOBACCO NON-USER: CPT | Performed by: SURGERY

## 2025-04-28 PROCEDURE — G8420 CALC BMI NORM PARAMETERS: HCPCS | Performed by: SURGERY

## 2025-04-28 PROCEDURE — 1160F RVW MEDS BY RX/DR IN RCRD: CPT | Performed by: SURGERY

## 2025-04-28 PROCEDURE — 1123F ACP DISCUSS/DSCN MKR DOCD: CPT | Performed by: SURGERY

## 2025-04-28 PROCEDURE — 99203 OFFICE O/P NEW LOW 30 MIN: CPT | Performed by: SURGERY

## 2025-04-28 PROCEDURE — 1159F MED LIST DOCD IN RCRD: CPT | Performed by: SURGERY

## 2025-04-28 PROCEDURE — 1126F AMNT PAIN NOTED NONE PRSNT: CPT | Performed by: SURGERY

## 2025-04-28 PROCEDURE — G8427 DOCREV CUR MEDS BY ELIG CLIN: HCPCS | Performed by: SURGERY

## 2025-04-28 ASSESSMENT — PATIENT HEALTH QUESTIONNAIRE - PHQ9
SUM OF ALL RESPONSES TO PHQ QUESTIONS 1-9: 0
2. FEELING DOWN, DEPRESSED OR HOPELESS: NOT AT ALL
SUM OF ALL RESPONSES TO PHQ QUESTIONS 1-9: 0
SUM OF ALL RESPONSES TO PHQ QUESTIONS 1-9: 0
1. LITTLE INTEREST OR PLEASURE IN DOING THINGS: NOT AT ALL
SUM OF ALL RESPONSES TO PHQ QUESTIONS 1-9: 0

## 2025-04-28 NOTE — PROGRESS NOTES
[facility] History and Physical    History of Present Illness:      Toni Crook is a 85 y.o. 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  About 6 months ago he had a open right colectomy done by Dr. Morgan.  He recovered well from the surgery.  He is currently undergoing chemotherapy treatments.  He has noticed an enlarging bulge in the midline over the past few months.  He does not have any pain from the hernia.  He has normal bowel movements.    Past Medical History:   Diagnosis Date    Acne rosacea 6/1/2010    Atrial fibrillation (HCC)     Cancer (HCC) 10/2024    COLON    HTN (hypertension) 6/1/2010    Hypercholesteremia 6/1/2010       Past Surgical History:   Procedure Laterality Date    ABLATE L/R ATRIAL FIBRIL W/ISOLATED PULM VEIN N/A 1/8/2020    Ablation Following A-Fib  Addl performed by Hemant Keenan MD at [facility] CARDIAC CATH LAB    COLONOSCOPY N/A 09/23/2024    COLONOSCOPY performed by Sonia Welch MD at [facility] ENDOSCOPY    EP DEVICE PROCEDURE N/A 11/21/2023    Watchman casi closure device performed by Hemant Keenan MD at [facility] CARDIAC CATH LAB    EPHYS EVL TRNSPTL TX ATRIAL FIB ISOLAT PULM VEIN N/A 1/8/2020    ABLATION A-FIB  W COMPLETE EP STUDY performed by Hemant Keenan MD at [facility] CARDIAC CATH LAB    EYE SURGERY Bilateral     CATARACTS    INTRACARD ECHO, THER/DX INTERVENT N/A 1/8/2020    Intracardiac Echocardiogram performed by Hemant Keenan MD at [facility] CARDIAC CATH LAB    INTRACARDIAC ELECTROPHYSIOLOGIC 3D MAPPING N/A 1/8/2020    Ep 3d Mapping performed by Hemant Keenan MD at [facility] CARDIAC CATH LAB    SMALL INTESTINE SURGERY N/A 10/14/2024    RIGHT COLECTOMY (E R A S) performed by Clay Bateman MD at [facility] MAIN OR    UPPER GASTROINTESTINAL ENDOSCOPY N/A 09/23/2024    ESOPHAGOGASTRODUODENOSCOPY performed by Sonia Welch MD at [facility] ENDOSCOPY    WISDOM TOOTH EXTRACTION

## 2025-04-28 NOTE — PROGRESS NOTES
Identified patient with two patient identifiers (name and ). Reviewed chart in preparation for visit and have obtained necessary documentation.    Toni Crook is a 85 y.o. male  Chief Complaint   Patient presents with    New Patient     Incisional hernia     /78   Pulse 70   Temp 98.1 °F (36.7 °C) (Oral)   Resp 16   Ht 1.778 m (5' 10\")   Wt 74.9 kg (165 lb 3.2 oz)   SpO2 96%   BMI 23.70 kg/m²     1. Have you been to the ER, urgent care clinic since your last visit?  Hospitalized since your last visit?no  2. Have you seen or consulted any other health care providers outside of the [facility] since your last visit?  Include any pap smears or colon screening. yes -

## 2025-05-09 ENCOUNTER — CLINICAL DOCUMENTATION (OUTPATIENT)
Age: 85
End: 2025-05-09

## 2025-05-09 NOTE — PROGRESS NOTES
Received labs dated 04/29/2025.    WBC 9.3  Hgb 13.2  Hct 40.9  Plt 57  Na 140  K 4.2  Glu 89  BUN 11  Cr 0.96  Tbili 0.5  AST 45  ALT 36      CT (03/2025): 2 hepatic lesions improved; one has disappeared, the other has reduced in size by 40%.    PET (12/2024): Multiple FDG avid lesions in the liver, confirming suspicion for metastatic disease (primary cancer is colon adenocarcinoma).

## 2025-05-17 NOTE — PLAN OF CARE
Problem: Pain  Goal: Verbalizes/displays adequate comfort level or baseline comfort level  10/17/2024 1233 by Mandy Amezcua, RN  Outcome: Adequate for Discharge     Problem: Safety - Adult  Goal: Free from fall injury  10/17/2024 1233 by Mandy Amezcua, RN  Outcome: Adequate for Discharge     Problem: Discharge Planning  Goal: Discharge to home or other facility with appropriate resources  Recent Flowsheet Documentation  Taken 10/17/2024 0932 by Mandy Amezcua, RN  Discharge to home or other facility with appropriate resources:   Identify barriers to discharge with patient and caregiver   Identify discharge learning needs (meds, wound care, etc)           Name band;

## 2025-06-12 ENCOUNTER — CLINICAL DOCUMENTATION (OUTPATIENT)
Age: 85
End: 2025-06-12

## 2025-06-12 NOTE — PROGRESS NOTES
Records reviewed from [facility] office visit 5/27/25  Labs WBC 6.5, hgb 13.9, plt 65, Na 140, K 4.4, cl 103, creat 1.02, alp 187, ast 42, alt 32, GFR 71  CEA 5/27/25 7.2  PET 12/2024 showed multiple FDG avid lesions in the liver confirming suspicions that he has metastatic disease.  CT 3/2025 shows improvement in the 2 hepatic lesions. One has disappeared and the other has shrunk by 40%.

## 2025-06-26 RX ORDER — LISINOPRIL 20 MG/1
20 TABLET ORAL DAILY
Qty: 90 TABLET | Refills: 0 | Status: SHIPPED | OUTPATIENT
Start: 2025-06-26

## 2025-06-26 NOTE — TELEPHONE ENCOUNTER
VO per NP    Future Appointments   Date Time Provider Department Center   8/13/2025  3:30 PM Enrike Whitney MD [facility]

## 2025-07-22 ENCOUNTER — CLINICAL DOCUMENTATION (OUTPATIENT)
Age: 85
End: 2025-07-22

## 2025-07-22 NOTE — PROGRESS NOTES
Received labs dated 07/01/2025.    WBC 4.1  Hgb 14  Hct 43.2  Na 139  K 4.1  Glu 135  BUN 12  Cr 1.06  Tbili 0.5  AST 44  ALT 37

## (undated) DEVICE — LIQUIBAND RAPID ADHESIVE 36/CS 0.8ML: Brand: MEDLINE

## (undated) DEVICE — Device

## (undated) DEVICE — X-RAY DETECTABLE SPONGES,16 PLY: Brand: VISTEC

## (undated) DEVICE — NON-REM POLYHESIVE PATIENT RETURN ELECTRODE: Brand: VALLEYLAB

## (undated) DEVICE — PRESSURE MONITORING SET: Brand: TRUWAVE

## (undated) DEVICE — INTRODUCER SHTH 5FR L12CM SNAP LOK OBT 11FR DBL DST J STR

## (undated) DEVICE — PAD,NON-ADHERENT,3X8,STERILE,LF,1/PK: Brand: MEDLINE

## (undated) DEVICE — BLADELESS OBTURATOR: Brand: WECK VISTA

## (undated) DEVICE — CATHETER IV 20GA L1.16IN OD1.0414-1.1176MM ID0.762-0.8382MM

## (undated) DEVICE — VESSEL SEALER: Brand: ENDOWRIST

## (undated) DEVICE — BLADE ES L6IN ELASTOMERIC COAT EXT DURABLE BEND UPTO 90DEG

## (undated) DEVICE — SYRINGE MED 30ML STD CLR PLAS LUERLOCK TIP N CTRL DISP

## (undated) DEVICE — DEVICE INFL 60ML 15ATM DISPOSABLE STERIFLATE CRE

## (undated) DEVICE — WASTE KIT - ST MARY: Brand: MEDLINE INDUSTRIES, INC.

## (undated) DEVICE — 1 X VERSACROSS STEERABLE SHEATH (INCLUDING  1 X DILATOR AND 1 X J-TIP GUIDEWIRE); 1 X VERSACROSS RF WIRE (INCLUDING 1 X CONNECTOR CABLE (SINGLE USE)); 1 X DISPERSIVE ELECTRODE: Brand: VERSACROSS STEERABLE ACCESS SOLUTION

## (undated) DEVICE — SET TBNG L260CM IRRIG RF THER COOL PNT

## (undated) DEVICE — SHEET,DRAPE,53X77,STERILE: Brand: MEDLINE

## (undated) DEVICE — SYRINGE MED 10ML LUERLOCK TIP W/O SFTY DISP

## (undated) DEVICE — STAPLER 60MM POWERED ECHELON 3000  SHORT 340MM

## (undated) DEVICE — CABLE RMFG COOL PATH --

## (undated) DEVICE — 1LYRTR 16FR10ML100%SIL UMS SNP: Brand: MEDLINE INDUSTRIES, INC.

## (undated) DEVICE — SOLUTION ANTIFOG VIS SYS CLEARIFY LAPSCP

## (undated) DEVICE — Device: Brand: NRG TRANSSEPTAL NEEDLE

## (undated) DEVICE — ENDOSCOPIC KIT COMPLIANCE ENDOKIT

## (undated) DEVICE — RELOAD STPL L60MM H1-2.6MM MESENTERY THN TISS WHT 6 ROW

## (undated) DEVICE — PERCLOSE PROGLIDE™ SUTURE-MEDIATED CLOSURE SYSTEM: Brand: PERCLOSE PROGLIDE™

## (undated) DEVICE — GENERAL LAPAROSCOPY - SMH: Brand: MEDLINE INDUSTRIES, INC.

## (undated) DEVICE — GLOVE ORANGE PI 7 1/2   MSG9075

## (undated) DEVICE — ELECTRODE PT RET AD L9FT HI MOIST COND ADH HYDRGEL CORDED

## (undated) DEVICE — SEPRAFILM ADHESION BARRIER

## (undated) DEVICE — INTRODUCER SHTH 8FR L12CM DBL DST GWIRE L50CM 0.038IN

## (undated) DEVICE — INTRO SHTH 8.5F 71MM MED CURL -- STEER AGILIS NXT

## (undated) DEVICE — ARM DRAPE

## (undated) DEVICE — SUTURE PERMAHAND SZ 2-0 L30IN NONABSORBABLE BLK SH L26MM C016D

## (undated) DEVICE — CATH RMFG LIVWR 6FRX115 --

## (undated) DEVICE — STERILE-Z MAYO STAND COVERS CLEAR POLYETHYLENE STERILE UNIVERSAL FIT 20 PER CASE: Brand: STERILE-Z

## (undated) DEVICE — AIRSEAL 8 MM ACCESS PORT AND LOW PROFILE OBTURATOR WITH BLADELESS OPTICAL TIP, 120 MM LENGTH: Brand: AIRSEAL

## (undated) DEVICE — CATHETER IV 18GA L1.16IN OD1.27-1.3462MM ID0.9398-1.016MM

## (undated) DEVICE — SUTURE ABSORBABLE MONOFILAMENT 1-0 CT1 27 IN VIO PDS + PDP341H

## (undated) DEVICE — REDUCER: Brand: ENDOWRIST

## (undated) DEVICE — PADPRO DEFIBRILLATION/PACING/CARDIOVERSION/MONITORING ELECTRODES, ADULT/CHILD GREATER THAN 10 KG RADIOTRANSPARENT ELECTRODE, PHYSIO-CONTROL QUIK-COMBO (M) 60" (152 CM): Brand: PADPRO

## (undated) DEVICE — PACK PROCEDURE SURG HRT CATH

## (undated) DEVICE — TIP COVER ACCESSORY

## (undated) DEVICE — CABLE FOR DIAGNOSTIC CATHETER: Brand: CABLE, SURELINK™

## (undated) DEVICE — BLADE SURG NO15 C STL GLASSVAN

## (undated) DEVICE — INTRODUCER SHTH 10FR L12CM DIA0.038IN CLOSE TOL EXTRUSION

## (undated) DEVICE — FORCEPS BX L240CM JAW DIA2.4MM WRK CHN 2.8MM ORNG L CAP W/

## (undated) DEVICE — TRI-LUMEN FILTERED TUBE SET WITH ACTIVATED CHARCOAL FILTER: Brand: AIRSEAL

## (undated) DEVICE — CATHETER ETER DIAG L110CM OD6FR VASC PGTL W SIDE H COR W OUT

## (undated) DEVICE — RELOAD STPL L60MM H1.5-3.6MM REG TISS BLU GRIPPING SURF B

## (undated) DEVICE — TIP SUCT TRNSPAR RIB SURF STD BLB RIG NVENT W/ 5IN1 CONN DYND50138] MEDLINE INDUSTRIES INC]

## (undated) DEVICE — COLON CLOSING PACK: Brand: MEDLINE INDUSTRIES, INC.

## (undated) DEVICE — SUTURE PDS II SZ 3-0 L27IN ABSRB VLT L26MM SH 1/2 CIR Z316H

## (undated) DEVICE — CATHETER ABLAT 8FR L115CM TIP ELECTRD L4MM D-F CRV 1-4-1

## (undated) DEVICE — COVIDIEN KENDALL DL DISPOSABLE 3 LEAD SY: Brand: MEDLINE RENEWAL

## (undated) DEVICE — SEALER ENDOSCP NANO COAT OPN DIV CRV L JAW LIGASURE IMPACT

## (undated) DEVICE — INTENT OT USE PROVIDES A STERILE INTERFACE BETWEEN THE OPERATING ROOM SURGICAL LAMPS (NON-STERILE) AND THE SURGEON OR STAFF WORKING IN THE STERILE FIELD.: Brand: ASPEN® ALC PLUS LIGHT HANDLE COVER

## (undated) DEVICE — CATH RMFG EP DCAPLR 6FR 125CM --

## (undated) DEVICE — SYRINGE INFL 60ML DISP ALLIANCE II

## (undated) DEVICE — CABLE RMFG EP EXT DECAPLAR 5FT --

## (undated) DEVICE — GARMENT,MEDLINE,DVT,INT,CALF,MED, GEN2: Brand: MEDLINE

## (undated) DEVICE — YANKAUER,POOLE TIP,STERILE,50/CS: Brand: MEDLINE

## (undated) DEVICE — SUTURE PERMA-HAND SZ 2-0 L30IN NONABSORBABLE BLK L26MM SH K833H

## (undated) DEVICE — CATHETER EP 7FR L99CM 1-4-1MM SPC 20 POLE MAP DUO DECAPOLAR

## (undated) DEVICE — REM POLYHESIVE ADULT PATIENT RETURN ELECTRODE: Brand: VALLEYLAB

## (undated) DEVICE — TOWEL,OR,DSP,ST,BLUE,STD,4/PK,20PK/CS: Brand: MEDLINE

## (undated) DEVICE — PAD PT POS 36 IN SURGYPAD DISP

## (undated) DEVICE — 3M™ TEGADERM™ TRANSPARENT FILM DRESSING FRAME STYLE, 1626W, 4 IN X 4-3/4 IN (10 CM X 12 CM), 50/CT 4CT/CASE: Brand: 3M™ TEGADERM™

## (undated) DEVICE — NEEDLE SCLERO 25GA L240CM OD0.51MM ID0.24MM EXTN L4MM SHTH

## (undated) DEVICE — BITEBLOCK 54FR W/ DENT RIM BLOX

## (undated) DEVICE — GLOVE SURG SZ 8 L12IN FNGR THK79MIL GRN LTX FREE

## (undated) DEVICE — DRAIN SURG 19FR 0.25IN SIL RND W/ TRCR INDIC DOT RADPQ FULL

## (undated) DEVICE — DRAPE FLD WRM W44XL66IN C6L FOR INTRATEMP SYS THERMABASIN

## (undated) DEVICE — RESERVOIR,SUCTION,100CC,SILICONE: Brand: MEDLINE

## (undated) DEVICE — PACK,BASIC,SIRUS,V: Brand: MEDLINE

## (undated) DEVICE — ESOPHAGEAL BALLOON DILATATION CATHETER: Brand: CRE FIXED WIRE

## (undated) DEVICE — SOLUTION IRRIG 1000ML STRL H2O USP PLAS POUR BTL

## (undated) DEVICE — SET GRAV CK VLV NEEDLESS ST 3 GANGED 4WAY STPCOCK HI FLO 10

## (undated) DEVICE — INTRODUCER SHTH 6FR L12CM DIA0.038IN HEMSTAS CLOSE TOL

## (undated) DEVICE — CATHETER IV 22GA L1IN OD0.8382-0.9144MM ID0.6096-0.6858MM 382523

## (undated) DEVICE — CONTAINER SPEC 20 ML LID NEUT BUFF FORMALIN 10 % POLYPR STS

## (undated) DEVICE — ACCESS SHEATH WITH DILATOR: Brand: WATCHMAN FXD CURVE™ ACCESS SYSTEM

## (undated) DEVICE — SUTURE PDS II SZ 1 L96IN ABSRB VLT TP-1 L65MM 1/2 CIR Z880G

## (undated) DEVICE — CABLE RMFG EXT CATH --

## (undated) DEVICE — HYPODERMIC SAFETY NEEDLE: Brand: MONOJECT

## (undated) DEVICE — MTS LEFT HEART KIT ST MARY'S RICHMOND: Brand: NAMIC

## (undated) DEVICE — TORFLEX TRANSSEPTAL SHEATH; TRANSSEPTAL DILATOR; J-TIP GUIDEWIRE: Brand: TORFLEX TRANSSEPTAL GUIDING SHEATH

## (undated) DEVICE — IV START KIT: Brand: MEDLINE

## (undated) DEVICE — PINNACLE INTRODUCER SHEATH: Brand: PINNACLE

## (undated) DEVICE — SUTURE NONABSORBABLE MONOFILAMENT 2-0 FS 18 IN ETHILON 664H

## (undated) DEVICE — CATHETER EP ADOL AD 9FR L90CM TEMP SGL HND SELF LOK 4 W TIP

## (undated) DEVICE — SEAL

## (undated) DEVICE — Device: Brand: RFP-100A CONNECTOR CABLE

## (undated) DEVICE — ACCESS PLATFORM FOR MINIMALLY INVASIVE SURGERY: Brand: GELPOINT®  MINI ADVANCED ACCESS PLATFORM

## (undated) DEVICE — WOUND RETRACTOR AND PROTECTOR: Brand: ALEXIS WOUND PROTECTOR-RETRACTOR

## (undated) DEVICE — ELECTRO LUBE IS A SINGLE PATIENT USE DEVICE THAT IS INTENDED TO BE USED ON ELECTROSURGICAL ELECTRODES TO REDUCE STICKING.: Brand: KEY SURGICAL ELECTRO LUBE

## (undated) DEVICE — SOLUTION IRRIG 1000ML 0.9% SOD CHL USP POUR PLAS BTL

## (undated) DEVICE — Device: Brand: PADPRO

## (undated) DEVICE — KIT ELECTRD SURF FOR DISPLAYING THE 3D POS OF EP CATH

## (undated) DEVICE — CATHETER IV 24GA L0.75IN OD0.6604-0.7366MM

## (undated) DEVICE — SPONGE LAP W18XL18IN WHT COT 4 PLY FLD STRUNG RADPQ DISP ST 2 PER PACK